# Patient Record
Sex: FEMALE | Race: WHITE | NOT HISPANIC OR LATINO | URBAN - METROPOLITAN AREA
[De-identification: names, ages, dates, MRNs, and addresses within clinical notes are randomized per-mention and may not be internally consistent; named-entity substitution may affect disease eponyms.]

---

## 2017-04-15 ENCOUNTER — APPOINTMENT (OUTPATIENT)
Dept: RADIOLOGY | Facility: MEDICAL CENTER | Age: 27
DRG: 917 | End: 2017-04-15
Attending: SURGERY
Payer: COMMERCIAL

## 2017-04-15 ENCOUNTER — APPOINTMENT (OUTPATIENT)
Dept: RADIOLOGY | Facility: MEDICAL CENTER | Age: 27
DRG: 917 | End: 2017-04-15
Attending: EMERGENCY MEDICINE
Payer: COMMERCIAL

## 2017-04-15 ENCOUNTER — RESOLUTE PROFESSIONAL BILLING HOSPITAL PROF FEE (OUTPATIENT)
Dept: OTHER | Facility: MEDICAL CENTER | Age: 27
End: 2017-04-15
Payer: COMMERCIAL

## 2017-04-15 ENCOUNTER — HOSPITAL ENCOUNTER (INPATIENT)
Facility: MEDICAL CENTER | Age: 27
LOS: 6 days | DRG: 917 | End: 2017-04-21
Attending: EMERGENCY MEDICINE | Admitting: INTERNAL MEDICINE
Payer: COMMERCIAL

## 2017-04-15 ENCOUNTER — APPOINTMENT (OUTPATIENT)
Dept: RADIOLOGY | Facility: MEDICAL CENTER | Age: 27
DRG: 917 | End: 2017-04-15
Attending: STUDENT IN AN ORGANIZED HEALTH CARE EDUCATION/TRAINING PROGRAM
Payer: COMMERCIAL

## 2017-04-15 DIAGNOSIS — R41.82 ALTERED MENTAL STATUS, UNSPECIFIED ALTERED MENTAL STATUS TYPE: ICD-10-CM

## 2017-04-15 DIAGNOSIS — I95.9 HYPOTENSION, UNSPECIFIED HYPOTENSION TYPE: ICD-10-CM

## 2017-04-15 DIAGNOSIS — Z01.818 ENCOUNTER FOR INTUBATION: ICD-10-CM

## 2017-04-15 PROBLEM — J96.90 RESPIRATORY FAILURE (HCC): Status: ACTIVE | Noted: 2017-04-15

## 2017-04-15 LAB
ABO GROUP BLD: NORMAL
ALBUMIN SERPL BCP-MCNC: 4 G/DL (ref 3.2–4.9)
ALBUMIN/GLOB SERPL: 1.3 G/DL
ALP SERPL-CCNC: 78 U/L (ref 30–99)
ALT SERPL-CCNC: 11 U/L (ref 2–50)
AMPHET UR QL SCN: POSITIVE
ANION GAP SERPL CALC-SCNC: 13 MMOL/L (ref 0–11.9)
APAP SERPL-MCNC: <10 UG/ML (ref 10–30)
APTT PPP: 24.7 SEC (ref 24.7–36)
AST SERPL-CCNC: 12 U/L (ref 12–45)
BARBITURATES UR QL SCN: NEGATIVE
BASE EXCESS BLDA CALC-SCNC: -5 MMOL/L (ref -4–3)
BASE EXCESS BLDA CALC-SCNC: -8 MMOL/L (ref -4–3)
BENZODIAZ UR QL SCN: NEGATIVE
BILIRUB SERPL-MCNC: 0.9 MG/DL (ref 0.1–1.5)
BLD GP AB SCN SERPL QL: NORMAL
BODY TEMPERATURE: ABNORMAL CENTIGRADE
BODY TEMPERATURE: ABNORMAL DEGREES
BUN SERPL-MCNC: 15 MG/DL (ref 8–22)
BZE UR QL SCN: NEGATIVE
CALCIUM SERPL-MCNC: 9.3 MG/DL (ref 8.5–10.5)
CANNABINOIDS UR QL SCN: NEGATIVE
CFT BLD TEG: 6 MIN (ref 5–10)
CHLORIDE SERPL-SCNC: 105 MMOL/L (ref 96–112)
CLOT ANGLE BLD TEG: 67.9 DEGREES (ref 53–72)
CLOT LYSIS 30M P MA LENFR BLD TEG: 3.1 % (ref 0–8)
CO2 BLDA-SCNC: 19 MMOL/L (ref 20–33)
CO2 SERPL-SCNC: 18 MMOL/L (ref 20–33)
CREAT SERPL-MCNC: 0.78 MG/DL (ref 0.5–1.4)
CT.EXTRINSIC BLD ROTEM: 1.5 MIN (ref 1–3)
EKG IMPRESSION: NORMAL
ERYTHROCYTE [DISTWIDTH] IN BLOOD BY AUTOMATED COUNT: 43.8 FL (ref 35.9–50)
ETHANOL BLD-MCNC: 0 G/DL
GFR SERPL CREATININE-BSD FRML MDRD: >60 ML/MIN/1.73 M 2
GLOBULIN SER CALC-MCNC: 3.1 G/DL (ref 1.9–3.5)
GLUCOSE SERPL-MCNC: 215 MG/DL (ref 65–99)
HCG SERPL QL: NEGATIVE
HCO3 BLDA-SCNC: 18 MMOL/L (ref 17–25)
HCO3 BLDA-SCNC: 18.1 MMOL/L (ref 17–25)
HCT VFR BLD AUTO: 38.1 % (ref 37–47)
HGB BLD-MCNC: 12.9 G/DL (ref 12–16)
INR PPP: 1.19 (ref 0.87–1.13)
LACTATE BLD-SCNC: 1.1 MMOL/L (ref 0.5–2)
LACTATE BLD-SCNC: 3.7 MMOL/L (ref 0.5–2)
LITHIUM SERPL-MCNC: <0.1 MMOL/L (ref 0.6–1.2)
MAGNESIUM SERPL-MCNC: 1.9 MG/DL (ref 1.5–2.5)
MCF BLD TEG: 64.7 MM (ref 50–70)
MCH RBC QN AUTO: 30.3 PG (ref 27–33)
MCHC RBC AUTO-ENTMCNC: 33.9 G/DL (ref 33.6–35)
MCV RBC AUTO: 89.4 FL (ref 81.4–97.8)
MDMA UR QL SCN: NEGATIVE
METHADONE UR QL SCN: NEGATIVE
O2/TOTAL GAS SETTING VFR VENT: 50 %
OPIATES UR QL SCN: NEGATIVE
OXYCODONE UR QL SCN: NEGATIVE
PA AA BLD-ACNC: 10.8 %
PA ADP BLD-ACNC: 18.6 %
PCO2 BLDA: 30.1 MMHG (ref 26–37)
PCO2 BLDA: 38.3 MMHG (ref 26–37)
PCO2 TEMP ADJ BLDA: 36.6 MMHG (ref 26–37)
PCP UR QL SCN: NEGATIVE
PH BLDA: 7.28 [PH] (ref 7.4–7.5)
PH BLDA: 7.4 [PH] (ref 7.4–7.5)
PH TEMP ADJ BLDA: 7.3 [PH] (ref 7.4–7.5)
PLATELET # BLD AUTO: 226 K/UL (ref 164–446)
PMV BLD AUTO: 9.2 FL (ref 9–12.9)
PO2 BLDA: 235 MMHG (ref 64–87)
PO2 BLDA: 333.1 MMHG (ref 64–87)
PO2 TEMP ADJ BLDA: 230 MMHG (ref 64–87)
POTASSIUM SERPL-SCNC: 2.6 MMOL/L (ref 3.6–5.5)
PROPOXYPH UR QL SCN: NEGATIVE
PROT SERPL-MCNC: 7.1 G/DL (ref 6–8.2)
PROTHROMBIN TIME: 15.5 SEC (ref 12–14.6)
RBC # BLD AUTO: 4.26 M/UL (ref 4.2–5.4)
RH BLD: NORMAL
SAO2 % BLDA: 100 % (ref 93–99)
SAO2 % BLDA: 99 % (ref 93–99)
SODIUM SERPL-SCNC: 136 MMOL/L (ref 135–145)
SPECIMEN DRAWN FROM PATIENT: ABNORMAL
TEG ALGORITHM TGALG: NORMAL
WBC # BLD AUTO: 5 K/UL (ref 4.8–10.8)

## 2017-04-15 PROCEDURE — 96375 TX/PRO/DX INJ NEW DRUG ADDON: CPT

## 2017-04-15 PROCEDURE — 85576 BLOOD PLATELET AGGREGATION: CPT

## 2017-04-15 PROCEDURE — 700111 HCHG RX REV CODE 636 W/ 250 OVERRIDE (IP): Performed by: PHARMACIST

## 2017-04-15 PROCEDURE — 5A1945Z RESPIRATORY VENTILATION, 24-96 CONSECUTIVE HOURS: ICD-10-PCS | Performed by: EMERGENCY MEDICINE

## 2017-04-15 PROCEDURE — 71010 DX-CHEST-LIMITED (1 VIEW): CPT

## 2017-04-15 PROCEDURE — 36600 WITHDRAWAL OF ARTERIAL BLOOD: CPT

## 2017-04-15 PROCEDURE — G0390 TRAUMA RESPONS W/HOSP CRITI: HCPCS

## 2017-04-15 PROCEDURE — 770022 HCHG ROOM/CARE - ICU (200)

## 2017-04-15 PROCEDURE — 80053 COMPREHEN METABOLIC PANEL: CPT

## 2017-04-15 PROCEDURE — 72125 CT NECK SPINE W/O DYE: CPT

## 2017-04-15 PROCEDURE — 700111 HCHG RX REV CODE 636 W/ 250 OVERRIDE (IP): Performed by: EMERGENCY MEDICINE

## 2017-04-15 PROCEDURE — 85027 COMPLETE CBC AUTOMATED: CPT

## 2017-04-15 PROCEDURE — 99291 CRITICAL CARE FIRST HOUR: CPT

## 2017-04-15 PROCEDURE — 700101 HCHG RX REV CODE 250: Performed by: EMERGENCY MEDICINE

## 2017-04-15 PROCEDURE — 83735 ASSAY OF MAGNESIUM: CPT

## 2017-04-15 PROCEDURE — 31500 INSERT EMERGENCY AIRWAY: CPT

## 2017-04-15 PROCEDURE — 0BH18EZ INSERTION OF ENDOTRACHEAL AIRWAY INTO TRACHEA, VIA NATURAL OR ARTIFICIAL OPENING ENDOSCOPIC: ICD-10-PCS | Performed by: EMERGENCY MEDICINE

## 2017-04-15 PROCEDURE — 85347 COAGULATION TIME ACTIVATED: CPT

## 2017-04-15 PROCEDURE — 96376 TX/PRO/DX INJ SAME DRUG ADON: CPT

## 2017-04-15 PROCEDURE — 86900 BLOOD TYPING SEROLOGIC ABO: CPT

## 2017-04-15 PROCEDURE — 99291 CRITICAL CARE FIRST HOUR: CPT | Mod: 25,GC | Performed by: INTERNAL MEDICINE

## 2017-04-15 PROCEDURE — 86850 RBC ANTIBODY SCREEN: CPT

## 2017-04-15 PROCEDURE — 80307 DRUG TEST PRSMV CHEM ANLYZR: CPT

## 2017-04-15 PROCEDURE — 85730 THROMBOPLASTIN TIME PARTIAL: CPT

## 2017-04-15 PROCEDURE — 93005 ELECTROCARDIOGRAM TRACING: CPT | Performed by: STUDENT IN AN ORGANIZED HEALTH CARE EDUCATION/TRAINING PROGRAM

## 2017-04-15 PROCEDURE — 700105 HCHG RX REV CODE 258: Performed by: PHARMACIST

## 2017-04-15 PROCEDURE — 80178 ASSAY OF LITHIUM: CPT

## 2017-04-15 PROCEDURE — 96374 THER/PROPH/DIAG INJ IV PUSH: CPT

## 2017-04-15 PROCEDURE — 82803 BLOOD GASES ANY COMBINATION: CPT

## 2017-04-15 PROCEDURE — 700111 HCHG RX REV CODE 636 W/ 250 OVERRIDE (IP): Performed by: STUDENT IN AN ORGANIZED HEALTH CARE EDUCATION/TRAINING PROGRAM

## 2017-04-15 PROCEDURE — 700102 HCHG RX REV CODE 250 W/ 637 OVERRIDE(OP): Performed by: EMERGENCY MEDICINE

## 2017-04-15 PROCEDURE — 85610 PROTHROMBIN TIME: CPT

## 2017-04-15 PROCEDURE — 36556 INSERT NON-TUNNEL CV CATH: CPT | Performed by: INTERNAL MEDICINE

## 2017-04-15 PROCEDURE — A9270 NON-COVERED ITEM OR SERVICE: HCPCS | Performed by: EMERGENCY MEDICINE

## 2017-04-15 PROCEDURE — 70450 CT HEAD/BRAIN W/O DYE: CPT

## 2017-04-15 PROCEDURE — 85384 FIBRINOGEN ACTIVITY: CPT

## 2017-04-15 PROCEDURE — 94002 VENT MGMT INPAT INIT DAY: CPT

## 2017-04-15 PROCEDURE — 84703 CHORIONIC GONADOTROPIN ASSAY: CPT

## 2017-04-15 PROCEDURE — 83605 ASSAY OF LACTIC ACID: CPT

## 2017-04-15 PROCEDURE — 76705 ECHO EXAM OF ABDOMEN: CPT

## 2017-04-15 PROCEDURE — 86901 BLOOD TYPING SEROLOGIC RH(D): CPT

## 2017-04-15 RX ORDER — POLYETHYLENE GLYCOL 3350 17 G/17G
1 POWDER, FOR SOLUTION ORAL
Status: DISCONTINUED | OUTPATIENT
Start: 2017-04-15 | End: 2017-04-21 | Stop reason: HOSPADM

## 2017-04-15 RX ORDER — CHLORHEXIDINE GLUCONATE ORAL RINSE 1.2 MG/ML
15 SOLUTION DENTAL 2 TIMES DAILY
Status: DISCONTINUED | OUTPATIENT
Start: 2017-04-15 | End: 2017-04-17

## 2017-04-15 RX ORDER — AMOXICILLIN 250 MG
2 CAPSULE ORAL 2 TIMES DAILY
Status: DISCONTINUED | OUTPATIENT
Start: 2017-04-15 | End: 2017-04-21 | Stop reason: HOSPADM

## 2017-04-15 RX ORDER — SODIUM CHLORIDE 9 MG/ML
INJECTION, SOLUTION INTRAVENOUS
Status: COMPLETED | OUTPATIENT
Start: 2017-04-15 | End: 2017-04-15

## 2017-04-15 RX ORDER — KETAMINE HYDROCHLORIDE 50 MG/ML
100 INJECTION, SOLUTION INTRAMUSCULAR; INTRAVENOUS ONCE
Status: COMPLETED | OUTPATIENT
Start: 2017-04-15 | End: 2017-04-15

## 2017-04-15 RX ORDER — LORAZEPAM 2 MG/ML
1 INJECTION INTRAMUSCULAR ONCE
Status: COMPLETED | OUTPATIENT
Start: 2017-04-15 | End: 2017-04-15

## 2017-04-15 RX ORDER — SODIUM CHLORIDE 9 MG/ML
INJECTION, SOLUTION INTRAVENOUS CONTINUOUS
Status: DISCONTINUED | OUTPATIENT
Start: 2017-04-15 | End: 2017-04-18

## 2017-04-15 RX ORDER — IPRATROPIUM BROMIDE AND ALBUTEROL SULFATE 2.5; .5 MG/3ML; MG/3ML
3 SOLUTION RESPIRATORY (INHALATION)
Status: DISCONTINUED | OUTPATIENT
Start: 2017-04-15 | End: 2017-04-21 | Stop reason: HOSPADM

## 2017-04-15 RX ORDER — BISACODYL 10 MG
10 SUPPOSITORY, RECTAL RECTAL
Status: DISCONTINUED | OUTPATIENT
Start: 2017-04-15 | End: 2017-04-15

## 2017-04-15 RX ORDER — HEPARIN SODIUM 5000 [USP'U]/ML
5000 INJECTION, SOLUTION INTRAVENOUS; SUBCUTANEOUS EVERY 8 HOURS
Status: DISCONTINUED | OUTPATIENT
Start: 2017-04-15 | End: 2017-04-17

## 2017-04-15 RX ORDER — ENEMA 19; 7 G/133ML; G/133ML
1 ENEMA RECTAL
Status: DISCONTINUED | OUTPATIENT
Start: 2017-04-15 | End: 2017-04-21 | Stop reason: HOSPADM

## 2017-04-15 RX ORDER — MIDAZOLAM HYDROCHLORIDE 1 MG/ML
INJECTION INTRAMUSCULAR; INTRAVENOUS
Status: COMPLETED | OUTPATIENT
Start: 2017-04-15 | End: 2017-04-15

## 2017-04-15 RX ORDER — LACTULOSE 20 G/30ML
30 SOLUTION ORAL
Status: DISCONTINUED | OUTPATIENT
Start: 2017-04-15 | End: 2017-04-21 | Stop reason: HOSPADM

## 2017-04-15 RX ORDER — SODIUM CHLORIDE 9 MG/ML
1000 INJECTION, SOLUTION INTRAVENOUS ONCE
Status: COMPLETED | OUTPATIENT
Start: 2017-04-15 | End: 2017-04-15

## 2017-04-15 RX ORDER — ONDANSETRON 2 MG/ML
4 INJECTION INTRAMUSCULAR; INTRAVENOUS EVERY 4 HOURS PRN
Status: DISCONTINUED | OUTPATIENT
Start: 2017-04-15 | End: 2017-04-21 | Stop reason: HOSPADM

## 2017-04-15 RX ORDER — NICOTINE 21 MG/24HR
14 PATCH, TRANSDERMAL 24 HOURS TRANSDERMAL
Status: DISCONTINUED | OUTPATIENT
Start: 2017-04-16 | End: 2017-04-18

## 2017-04-15 RX ORDER — POTASSIUM CHLORIDE 1.5 G/1.58G
40 POWDER, FOR SOLUTION ORAL ONCE
Status: COMPLETED | OUTPATIENT
Start: 2017-04-15 | End: 2017-04-15

## 2017-04-15 RX ORDER — SUCCINYLCHOLINE CHLORIDE 20 MG/ML
INJECTION INTRAMUSCULAR; INTRAVENOUS
Status: COMPLETED | OUTPATIENT
Start: 2017-04-15 | End: 2017-04-15

## 2017-04-15 RX ORDER — ACETAMINOPHEN 325 MG/1
650 TABLET ORAL EVERY 6 HOURS PRN
Status: DISCONTINUED | OUTPATIENT
Start: 2017-04-15 | End: 2017-04-21 | Stop reason: HOSPADM

## 2017-04-15 RX ORDER — KETAMINE HYDROCHLORIDE 50 MG/ML
INJECTION, SOLUTION INTRAMUSCULAR; INTRAVENOUS
Status: COMPLETED | OUTPATIENT
Start: 2017-04-15 | End: 2017-04-15

## 2017-04-15 RX ORDER — LIDOCAINE HYDROCHLORIDE 10 MG/ML
1-2 INJECTION, SOLUTION INFILTRATION; PERINEURAL
Status: DISCONTINUED | OUTPATIENT
Start: 2017-04-15 | End: 2017-04-17

## 2017-04-15 RX ORDER — BISACODYL 10 MG
10 SUPPOSITORY, RECTAL RECTAL
Status: DISCONTINUED | OUTPATIENT
Start: 2017-04-15 | End: 2017-04-21 | Stop reason: HOSPADM

## 2017-04-15 RX ORDER — POTASSIUM CHLORIDE 7.45 MG/ML
10 INJECTION INTRAVENOUS
Status: COMPLETED | OUTPATIENT
Start: 2017-04-15 | End: 2017-04-16

## 2017-04-15 RX ADMIN — KETAMINE HYDROCHLORIDE 50 MG: 50 INJECTION, SOLUTION, CONCENTRATE INTRAMUSCULAR; INTRAVENOUS at 19:55

## 2017-04-15 RX ADMIN — SODIUM CHLORIDE 1000 ML: 9 INJECTION, SOLUTION INTRAVENOUS at 20:09

## 2017-04-15 RX ADMIN — SODIUM CHLORIDE 1000 ML: 9 INJECTION, SOLUTION INTRAVENOUS at 20:03

## 2017-04-15 RX ADMIN — POTASSIUM CHLORIDE 40 MEQ: 1.5 POWDER, FOR SOLUTION ORAL at 20:51

## 2017-04-15 RX ADMIN — LORAZEPAM 1 MG: 2 INJECTION INTRAMUSCULAR; INTRAVENOUS at 21:20

## 2017-04-15 RX ADMIN — KETAMINE HYDROCHLORIDE 100 MG: 50 INJECTION, SOLUTION, CONCENTRATE INTRAMUSCULAR; INTRAVENOUS at 20:45

## 2017-04-15 RX ADMIN — SUCCINYLCHOLINE CHLORIDE 125 MG: 20 INJECTION, SOLUTION INTRAMUSCULAR; INTRAVENOUS at 19:58

## 2017-04-15 RX ADMIN — SODIUM CHLORIDE 1000 ML: 9 INJECTION, SOLUTION INTRAVENOUS at 22:15

## 2017-04-15 RX ADMIN — MIDAZOLAM HYDROCHLORIDE 2 MG: 1 INJECTION, SOLUTION INTRAMUSCULAR; INTRAVENOUS at 20:09

## 2017-04-15 RX ADMIN — FENTANYL CITRATE 100 MCG: 50 INJECTION, SOLUTION INTRAMUSCULAR; INTRAVENOUS at 21:20

## 2017-04-15 NOTE — IP AVS SNAPSHOT
" Home Care Instructions                                                                                                                  Name:Mago Bowman  Medical Record Number:[id number]  CSN: [id number]    YOB: 1990   Age: 26 y.o.  Sex: female  HT:1.702 m (5' 7.01\") WT: 61.7 kg (136 lb 0.4 oz)          Admit Date: 4/15/2017     Discharge Date:   Today's Date: 4/21/2017  Attending Doctor:  [facility] [place]*                  Allergies:  Ativan and Sulfa drugs            Discharge Instructions       Discharge Instructions    Discharged to home by car with relative. Discharged via wheelchair, hospital escort: Yes.  Special equipment needed: Not Applicable    Be sure to schedule a follow-up appointment with your primary care doctor or any specialists as instructed.     Discharge Plan:   Smoking Cessation Offered: Patient Counseled  Influenza Vaccine Indication: Patient Refuses    I understand that a diet low in cholesterol, fat, and sodium is recommended for good health. Unless I have been given specific instructions below for another diet, I accept this instruction as my diet prescription.   Other diet: as tolerated    Special Instructions:   Prescriptions given to family    · Is patient discharged on Warfarin / Coumadin?   No     · Is patient Post Blood Transfusion?  No    Depression / Suicide Risk    As you are discharged from this [facility] facility, it is important to learn how to keep safe from harming yourself.    Recognize the warning signs:  · Abrupt changes in personality, positive or negative- including increase in energy   · Giving away possessions  · Change in eating patterns- significant weight changes-  positive or negative  · Change in sleeping patterns- unable to sleep or sleeping all the time   · Unwillingness or inability to communicate  · Depression  · Unusual sadness, discouragement and loneliness  · Talk of wanting to die  · Neglect of personal appearance   · Rebelliousness- " reckless behavior  · Withdrawal from people/activities they love  · Confusion- inability to concentrate     If you or a loved one observes any of these behaviors or has concerns about self-harm, here's what you can do:  · Talk about it- your feelings and reasons for harming yourself  · Remove any means that you might use to hurt yourself (examples: pills, rope, extension cords, firearm)  · Get professional help from the community (Mental Health, Substance Abuse, psychological counseling)  · Do not be alone:Call your Safe Contact- someone whom you trust who will be there for you.  · Call your local CRISIS HOTLINE 492-7374 or 666-069-7860  · Call your local Children's Mobile Crisis Response Team [place] (958) 413-6894 or www.Threadflip  · Call the toll free National Suicide Prevention Hotlines   · National Suicide Prevention Lifeline 528-493-TIOF (8830)  · [place] [employer] Line Network 800-SUICIDE (309-8575)           Discharge Medication Instructions:    Below are the medications your physician expects you to take upon discharge:    Review all your home medications and newly ordered medications with your doctor and/or pharmacist. Follow medication instructions as directed by your doctor and/or pharmacist.    Please keep your medication list with you and share with your physician.               Medication List      START taking these medications        Instructions    Morning Afternoon Evening Bedtime    diazepam 10 MG tablet   Last time this was given:  10 mg on 4/20/2017  8:48 PM   Commonly known as:  VALIUM        Take 1 Tab by mouth every 8 hours as needed for Anxiety.   Dose:  10 mg                          CHANGE how you take these medications        Instructions    Morning Afternoon Evening Bedtime    lithium  MG Tbcr   What changed:    - medication strength  - how much to take  - when to take this   Last time this was given:  900 mg on 4/20/2017  8:45 PM   Commonly known as:  ESKALITH CR         Take 2 Tabs by mouth every day.   Dose:  900 mg                        quetiapine 200 MG Tabs   What changed:  how much to take   Last time this was given:  200 mg on 4/20/2017  8:45 PM   Commonly known as:  SEROQUEL        Take 1 Tab by mouth every evening.   Dose:  200 mg                             Where to Get Your Medications      Information about where to get these medications is not yet available     ! Ask your nurse or doctor about these medications    - diazepam 10 MG tablet  - lithium  MG Tbcr  - quetiapine 200 MG Tabs            Instructions           Diet / Nutrition:    Follow any diet instructions given to you by your doctor or the dietician, including how much salt (sodium) you are allowed each day.    If you are overweight, talk to your doctor about a weight reduction plan.    Activity:    Remain physically active following your doctor's instructions about exercise and activity.    Rest often.     Any time you become even a little tired or short of breath, SIT DOWN and rest.    Worsening Symptoms:    Report any of the following signs and symptoms to the doctor's office immediately:    *Pain of jaw, arm, or neck  *Chest pain not relieved by medication                               *Dizziness or loss of consciousness  *Difficulty breathing even when at rest   *More tired than usual                                       *Bleeding drainage or swelling of surgical site  *Swelling of feet, ankles, legs or stomach                 *Fever (>100ºF)  *Pink or blood tinged sputum  *Weight gain (3lbs/day or 5lbs /week)           *Shock from internal defibrillator (if applicable)  *Palpitations or irregular heartbeats                *Cool and/or numb extremities    Stroke Awareness    Common Risk Factors for Stroke include:    Age  Atrial Fibrillation  Carotid Artery Stenosis  Diabetes Mellitus  Excessive alcohol consumption  High blood pressure  Overweight   Physical inactivity  Smoking    Warning signs  and symptoms of a stroke include:    *Sudden numbness or weakness of the face, arm or leg (especially on one side of the body).  *Sudden confusion, trouble speaking or understanding.  *Sudden trouble seeing in one or both eyes.  *Sudden trouble walking, dizziness, loss of balance or coordination.Sudden severe headache with no known cause.    It is very important to get treatment quickly when a stroke occurs. If you experience any of the above warning signs, call 911 immediately.                   Disclaimer         Quit Smoking / Tobacco Use:    I understand the use of any tobacco products increases my chance of suffering from future heart disease or stroke and could cause other illnesses which may shorten my life. Quitting the use of tobacco products is the single most important thing I can do to improve my health. For further information on smoking / tobacco cessation call a Toll Free Quit Line at 1-125.647.8258 (*National Cancer [place]) or 1-251.431.5792 (American Lung Association) or you can access the web based program at www.lungNextNine.org.    [place] Tobacco Users Help Line:  (451) 460-5782       Toll Free: 1-736.708.2902  Quit Tobacco Program [facility] (571)953-4193    Crisis Hotline:    [place] Crisis Hotline:  9-855-XAPCYQL or 1-730.338.8377    [place] Crisis Hotline:    1-716.707.4077 or 237-997-1451    Discharge Survey:   [facility]. We hope we did everything we could to make your hospital stay a pleasant one. You may be receiving a phone survey and we would appreciate your time and participation in answering the questions. Your input is very valuable to us in our efforts to improve our service to our patients and their families.        My signature on this form indicates that:    1. I have reviewed and understand the above information.  2. My questions regarding this information have been answered to my satisfaction.  3. I have formulated a plan with my  discharge nurse to obtain my prescribed medications for home.                  Disclaimer         __________________________________                     __________       ________                       Patient Signature                                                 Date                    Time

## 2017-04-15 NOTE — IP AVS SNAPSHOT
SavingStar Access Code: [email or web address]  Expires: 5/21/2017 10:57 AM    Your email address is not on file at [facility].  Email Addresses are required for you to sign up for SavingStar, please contact [phone] to verify your personal information and to provide your email address prior to attempting to register for SavingStar.    Mago Martinbrenda  [address] [place] [address]    SavingStar  A secure, online tool to manage your health information     [employer]’s SavingStar® is a secure, online tool that connects you to your personalized health information from the privacy of your home -- day or night - making it very easy for you to manage your healthcare. Once the activation process is completed, you can even access your medical information using the SavingStar lalita, which is available for free in the Apple Lalita store or Google Play store.     To learn more about SavingStar, visit [email or web address]    There are two levels of access available (as shown below):   My Chart Features  [facility] Primary Care Doctor [facility]  Specialists [facility]  Urgent  Care Non-[facility] Primary Care Doctor   Email your healthcare team securely and privately 24/7 X X X    Manage appointments: schedule your next appointment; view details of past/upcoming appointments X      Request prescription refills. X      View recent personal medical records, including lab and immunizations X X X X   View health record, including health history, allergies, medications X X X X   Read reports about your outpatient visits, procedures, consult and ER notes X X X X   See your discharge summary, which is a recap of your hospital and/or ER visit that includes your diagnosis, lab results, and care plan X X  X     How to register for [employer]:  Once your e-mail address has been verified, follow the following steps to sign up for SavingStar.     1. Go to  [email or web address]  2. Click on the Sign Up Now box, which takes you to the New Member Sign Up page. You will need  to provide the following information:  a. Enter your ThetaRay Access Code exactly as it appears at the top of this page. (You will not need to use this code after you’ve completed the sign-up process. If you do not sign up before the expiration date, you must request a new code.)   b. Enter your date of birth.   c. Enter your home email address.   d. Click Submit, and follow the next screen’s instructions.  3. Create a ThetaRay ID. This will be your ThetaRay login ID and cannot be changed, so think of one that is secure and easy to remember.  4. Create a ThetaRay password. You can change your password at any time.  5. Enter your Password Reset Question and Answer. This can be used at a later time if you forget your password.   6. Enter your e-mail address. This allows you to receive e-mail notifications when new information is available in ThetaRay.  7. Click Sign Up. You can now view your health information.    For assistance activating your ThetaRay account, call [phone]

## 2017-04-15 NOTE — IP AVS SNAPSHOT
4/21/2017    Mago Bowman  [address] [address]    Dear Mago:    [facility] wants to ensure your discharge home is safe and you or your loved ones have had all of your questions answered regarding your care after you leave the hospital.    Below is a list of resources and contact information should you have any questions regarding your hospital stay, follow-up instructions, or active medical symptoms.    Questions or Concerns Regarding… Contact   Medical Questions Related to Your Discharge  (7 days a week, 8am-5pm) Contact a Nurse Care Coordinator   [phone]   Medical Questions Not Related to Your Discharge  (24 hours a day / 7 days a week)  Contact the Nurse Health Line   [phone]    Medications or Discharge Instructions Refer to your discharge packet   or contact your [facility] Primary Care Provider   [phone]   Follow-up Appointment(s) Schedule your appointment via "[employer]   or contact Scheduling [phone]   Billing Review your statement via "[employer]  or contact Billing [phone]   Medical Records Review your records via "[employer]   or contact Medical Records [phone]     You may receive a telephone call within two days of discharge. This call is to make certain you understand your discharge instructions and have the opportunity to have any questions answered. You can also easily access your medical information, test results and upcoming appointments via the "[employer] free online health management tool. You can learn more and sign up at [employer]/"[employer]. For assistance setting up your "[employer] account, please call [phone].    Once again, we want to ensure your discharge home is safe and that you have a clear understanding of any next steps in your care. If you have any questions or concerns, please do not hesitate to contact us, we are here for you. Thank you for choosing [facility] for your healthcare needs.    Sincerely,    Your [facility] Healthcare Team

## 2017-04-15 NOTE — IP AVS SNAPSHOT
" <p align=\"LEFT\"><IMG SRC=\"//[email or web address]/blob$/[email or web address]\" alt=\"Image\" WIDTH=\"50%\" HEIGHT=\"200\" BORDER=\"\"></p>                   Name:Mago Bowman  Medical Record Number:[id number]  CSN: [id number]    YOB: 1990   Age: 26 y.o.  Sex: female  HT:1.702 m (5' 7.01\") WT: 61.7 kg (136 lb 0.4 oz)          Admit Date: 4/15/2017     Discharge Date:   Today's Date: 4/21/2017  Attending Doctor:  [facility] [place] [facility]                  Allergies:  Ativan and Sulfa drugs             Medication List      Take these Medications        Instructions    diazepam 10 MG tablet   Commonly known as:  VALIUM    Take 1 Tab by mouth every 8 hours as needed for Anxiety.   Dose:  10 mg       lithium  MG Tbcr   What changed:    - medication strength  - how much to take  - when to take this   Commonly known as:  ESKALITH CR    Take 2 Tabs by mouth every day.   Dose:  900 mg       quetiapine 200 MG Tabs   What changed:  how much to take   Commonly known as:  SEROQUEL    Take 1 Tab by mouth every evening.   Dose:  200 mg         "

## 2017-04-16 ENCOUNTER — APPOINTMENT (OUTPATIENT)
Dept: RADIOLOGY | Facility: MEDICAL CENTER | Age: 27
DRG: 917 | End: 2017-04-16
Attending: STUDENT IN AN ORGANIZED HEALTH CARE EDUCATION/TRAINING PROGRAM
Payer: COMMERCIAL

## 2017-04-16 LAB
ABO GROUP BLD: NORMAL
ALBUMIN SERPL BCP-MCNC: 2.8 G/DL (ref 3.2–4.9)
ALBUMIN/GLOB SERPL: 1.3 G/DL
ALP SERPL-CCNC: 56 U/L (ref 30–99)
ALT SERPL-CCNC: 7 U/L (ref 2–50)
AMMONIA PLAS-SCNC: 18 UMOL/L (ref 11–45)
ANION GAP SERPL CALC-SCNC: 5 MMOL/L (ref 0–11.9)
AST SERPL-CCNC: 9 U/L (ref 12–45)
BASE EXCESS BLDA CALC-SCNC: -10 MMOL/L (ref -4–3)
BASE EXCESS BLDA CALC-SCNC: -9 MMOL/L (ref -4–3)
BASOPHILS # BLD AUTO: 0.4 % (ref 0–1.8)
BASOPHILS # BLD: 0.02 K/UL (ref 0–0.12)
BILIRUB SERPL-MCNC: 0.8 MG/DL (ref 0.1–1.5)
BODY TEMPERATURE: ABNORMAL DEGREES
BODY TEMPERATURE: ABNORMAL DEGREES
BUN SERPL-MCNC: 10 MG/DL (ref 8–22)
CA-I BLD ISE-SCNC: 1.31 MMOL/L (ref 1.1–1.3)
CALCIUM SERPL-MCNC: 7.6 MG/DL (ref 8.5–10.5)
CHLORIDE SERPL-SCNC: 117 MMOL/L (ref 96–112)
CO2 BLDA-SCNC: 16 MMOL/L (ref 20–33)
CO2 BLDA-SCNC: 16 MMOL/L (ref 20–33)
CO2 SERPL-SCNC: 17 MMOL/L (ref 20–33)
CREAT SERPL-MCNC: 0.5 MG/DL (ref 0.5–1.4)
EOSINOPHIL # BLD AUTO: 0.05 K/UL (ref 0–0.51)
EOSINOPHIL NFR BLD: 1.1 % (ref 0–6.9)
ERYTHROCYTE [DISTWIDTH] IN BLOOD BY AUTOMATED COUNT: 45.4 FL (ref 35.9–50)
GFR SERPL CREATININE-BSD FRML MDRD: >60 ML/MIN/1.73 M 2
GLOBULIN SER CALC-MCNC: 2.1 G/DL (ref 1.9–3.5)
GLUCOSE SERPL-MCNC: 100 MG/DL (ref 65–99)
HCO3 BLDA-SCNC: 15.2 MMOL/L (ref 17–25)
HCO3 BLDA-SCNC: 15.3 MMOL/L (ref 17–25)
HCT VFR BLD AUTO: 29.2 % (ref 37–47)
HCT VFR BLD AUTO: 31 % (ref 37–47)
HCT VFR BLD CALC: 23 % (ref 37–47)
HGB BLD-MCNC: 10.3 G/DL (ref 12–16)
HGB BLD-MCNC: 7.8 G/DL (ref 12–16)
HGB BLD-MCNC: 9.6 G/DL (ref 12–16)
IMM GRANULOCYTES # BLD AUTO: 0.02 K/UL (ref 0–0.11)
IMM GRANULOCYTES NFR BLD AUTO: 0.4 % (ref 0–0.9)
LITHIUM SERPL-MCNC: <0.1 MMOL/L (ref 0.6–1.2)
LYMPHOCYTES # BLD AUTO: 1.45 K/UL (ref 1–4.8)
LYMPHOCYTES NFR BLD: 32.3 % (ref 22–41)
MAGNESIUM SERPL-MCNC: 1.7 MG/DL (ref 1.5–2.5)
MCH RBC QN AUTO: 30.3 PG (ref 27–33)
MCHC RBC AUTO-ENTMCNC: 32.9 G/DL (ref 33.6–35)
MCV RBC AUTO: 92.1 FL (ref 81.4–97.8)
MONOCYTES # BLD AUTO: 0.64 K/UL (ref 0–0.85)
MONOCYTES NFR BLD AUTO: 14.3 % (ref 0–13.4)
NEUTROPHILS # BLD AUTO: 2.31 K/UL (ref 2–7.15)
NEUTROPHILS NFR BLD: 51.5 % (ref 44–72)
NRBC # BLD AUTO: 0 K/UL
NRBC BLD AUTO-RTO: 0 /100 WBC
O2/TOTAL GAS SETTING VFR VENT: 0.3 %
O2/TOTAL GAS SETTING VFR VENT: 30 %
PCO2 BLDA: 25.2 MMHG (ref 26–37)
PCO2 BLDA: 31.9 MMHG (ref 26–37)
PCO2 TEMP ADJ BLDA: 24.6 MMHG (ref 26–37)
PCO2 TEMP ADJ BLDA: 30.5 MMHG (ref 26–37)
PH BLDA: 7.29 [PH] (ref 7.4–7.5)
PH BLDA: 7.39 [PH] (ref 7.4–7.5)
PH TEMP ADJ BLDA: 7.3 [PH] (ref 7.4–7.5)
PH TEMP ADJ BLDA: 7.4 [PH] (ref 7.4–7.5)
PHOSPHATE SERPL-MCNC: 2.7 MG/DL (ref 2.5–4.5)
PLATELET # BLD AUTO: 151 K/UL (ref 164–446)
PMV BLD AUTO: 9.3 FL (ref 9–12.9)
PO2 BLDA: 131 MMHG (ref 64–87)
PO2 BLDA: 132 MMHG (ref 64–87)
PO2 TEMP ADJ BLDA: 125 MMHG (ref 64–87)
PO2 TEMP ADJ BLDA: 128 MMHG (ref 64–87)
POTASSIUM BLD-SCNC: 3.6 MMOL/L (ref 3.6–5.5)
POTASSIUM SERPL-SCNC: 3.7 MMOL/L (ref 3.6–5.5)
PROT SERPL-MCNC: 4.9 G/DL (ref 6–8.2)
RBC # BLD AUTO: 3.17 M/UL (ref 4.2–5.4)
SALICYLATES SERPL-MCNC: 0 MG/DL (ref 15–25)
SAO2 % BLDA: 99 % (ref 93–99)
SAO2 % BLDA: 99 % (ref 93–99)
SODIUM BLD-SCNC: 141 MMOL/L (ref 135–145)
SODIUM SERPL-SCNC: 139 MMOL/L (ref 135–145)
SPECIMEN DRAWN FROM PATIENT: ABNORMAL
SPECIMEN DRAWN FROM PATIENT: ABNORMAL
WBC # BLD AUTO: 4.5 K/UL (ref 4.8–10.8)

## 2017-04-16 PROCEDURE — 85014 HEMATOCRIT: CPT

## 2017-04-16 PROCEDURE — A9270 NON-COVERED ITEM OR SERVICE: HCPCS | Performed by: STUDENT IN AN ORGANIZED HEALTH CARE EDUCATION/TRAINING PROGRAM

## 2017-04-16 PROCEDURE — 82330 ASSAY OF CALCIUM: CPT

## 2017-04-16 PROCEDURE — 80178 ASSAY OF LITHIUM: CPT

## 2017-04-16 PROCEDURE — 99291 CRITICAL CARE FIRST HOUR: CPT | Mod: GC | Performed by: INTERNAL MEDICINE

## 2017-04-16 PROCEDURE — 84132 ASSAY OF SERUM POTASSIUM: CPT

## 2017-04-16 PROCEDURE — 85018 HEMOGLOBIN: CPT

## 2017-04-16 PROCEDURE — 85025 COMPLETE CBC W/AUTO DIFF WBC: CPT

## 2017-04-16 PROCEDURE — 700102 HCHG RX REV CODE 250 W/ 637 OVERRIDE(OP): Performed by: INTERNAL MEDICINE

## 2017-04-16 PROCEDURE — 700105 HCHG RX REV CODE 258

## 2017-04-16 PROCEDURE — A9270 NON-COVERED ITEM OR SERVICE: HCPCS | Performed by: INTERNAL MEDICINE

## 2017-04-16 PROCEDURE — B548ZZA ULTRASONOGRAPHY OF SUPERIOR VENA CAVA, GUIDANCE: ICD-10-PCS | Performed by: STUDENT IN AN ORGANIZED HEALTH CARE EDUCATION/TRAINING PROGRAM

## 2017-04-16 PROCEDURE — C1751 CATH, INF, PER/CENT/MIDLINE: HCPCS

## 2017-04-16 PROCEDURE — 770022 HCHG ROOM/CARE - ICU (200)

## 2017-04-16 PROCEDURE — 94003 VENT MGMT INPAT SUBQ DAY: CPT

## 2017-04-16 PROCEDURE — 3E043XZ INTRODUCTION OF VASOPRESSOR INTO CENTRAL VEIN, PERCUTANEOUS APPROACH: ICD-10-PCS | Performed by: STUDENT IN AN ORGANIZED HEALTH CARE EDUCATION/TRAINING PROGRAM

## 2017-04-16 PROCEDURE — 700105 HCHG RX REV CODE 258: Performed by: STUDENT IN AN ORGANIZED HEALTH CARE EDUCATION/TRAINING PROGRAM

## 2017-04-16 PROCEDURE — 83735 ASSAY OF MAGNESIUM: CPT

## 2017-04-16 PROCEDURE — 700111 HCHG RX REV CODE 636 W/ 250 OVERRIDE (IP): Performed by: STUDENT IN AN ORGANIZED HEALTH CARE EDUCATION/TRAINING PROGRAM

## 2017-04-16 PROCEDURE — 36556 INSERT NON-TUNNEL CV CATH: CPT

## 2017-04-16 PROCEDURE — 80053 COMPREHEN METABOLIC PANEL: CPT

## 2017-04-16 PROCEDURE — 700102 HCHG RX REV CODE 250 W/ 637 OVERRIDE(OP): Performed by: STUDENT IN AN ORGANIZED HEALTH CARE EDUCATION/TRAINING PROGRAM

## 2017-04-16 PROCEDURE — 82803 BLOOD GASES ANY COMBINATION: CPT

## 2017-04-16 PROCEDURE — 84100 ASSAY OF PHOSPHORUS: CPT

## 2017-04-16 PROCEDURE — 700101 HCHG RX REV CODE 250: Performed by: INTERNAL MEDICINE

## 2017-04-16 PROCEDURE — 02HV33Z INSERTION OF INFUSION DEVICE INTO SUPERIOR VENA CAVA, PERCUTANEOUS APPROACH: ICD-10-PCS | Performed by: STUDENT IN AN ORGANIZED HEALTH CARE EDUCATION/TRAINING PROGRAM

## 2017-04-16 PROCEDURE — 71010 DX-CHEST-PORTABLE (1 VIEW): CPT

## 2017-04-16 PROCEDURE — 82140 ASSAY OF AMMONIA: CPT

## 2017-04-16 PROCEDURE — 76937 US GUIDE VASCULAR ACCESS: CPT

## 2017-04-16 PROCEDURE — 93010 ELECTROCARDIOGRAM REPORT: CPT | Performed by: INTERNAL MEDICINE

## 2017-04-16 PROCEDURE — 700111 HCHG RX REV CODE 636 W/ 250 OVERRIDE (IP): Performed by: INTERNAL MEDICINE

## 2017-04-16 PROCEDURE — 93005 ELECTROCARDIOGRAM TRACING: CPT | Performed by: STUDENT IN AN ORGANIZED HEALTH CARE EDUCATION/TRAINING PROGRAM

## 2017-04-16 PROCEDURE — 36600 WITHDRAWAL OF ARTERIAL BLOOD: CPT

## 2017-04-16 PROCEDURE — 700105 HCHG RX REV CODE 258: Performed by: INTERNAL MEDICINE

## 2017-04-16 PROCEDURE — 84295 ASSAY OF SERUM SODIUM: CPT

## 2017-04-16 RX ORDER — FAMOTIDINE 20 MG/1
20 TABLET, FILM COATED ORAL 2 TIMES DAILY
Status: DISCONTINUED | OUTPATIENT
Start: 2017-04-16 | End: 2017-04-17

## 2017-04-16 RX ORDER — LITHIUM CARBONATE 300 MG/1
300 TABLET, FILM COATED, EXTENDED RELEASE ORAL EVERY EVENING
Status: ON HOLD | COMMUNITY
End: 2017-04-21

## 2017-04-16 RX ORDER — QUETIAPINE FUMARATE 200 MG/1
400 TABLET, FILM COATED ORAL EVERY EVENING
Status: ON HOLD | COMMUNITY
End: 2017-04-21

## 2017-04-16 RX ORDER — SODIUM CHLORIDE 9 MG/ML
INJECTION, SOLUTION INTRAVENOUS
Status: COMPLETED
Start: 2017-04-16 | End: 2017-04-16

## 2017-04-16 RX ORDER — POTASSIUM CHLORIDE 14.9 MG/ML
20 INJECTION INTRAVENOUS ONCE
Status: COMPLETED | OUTPATIENT
Start: 2017-04-16 | End: 2017-04-16

## 2017-04-16 RX ORDER — MAGNESIUM SULFATE HEPTAHYDRATE 40 MG/ML
4 INJECTION, SOLUTION INTRAVENOUS ONCE
Status: COMPLETED | OUTPATIENT
Start: 2017-04-16 | End: 2017-04-16

## 2017-04-16 RX ADMIN — POTASSIUM CHLORIDE 10 MEQ: 7.46 INJECTION, SOLUTION INTRAVENOUS at 02:26

## 2017-04-16 RX ADMIN — FAMOTIDINE 20 MG: 10 INJECTION, SOLUTION INTRAVENOUS at 20:09

## 2017-04-16 RX ADMIN — POTASSIUM CHLORIDE 10 MEQ: 7.46 INJECTION, SOLUTION INTRAVENOUS at 03:36

## 2017-04-16 RX ADMIN — SODIUM CHLORIDE: 9 INJECTION, SOLUTION INTRAVENOUS at 00:32

## 2017-04-16 RX ADMIN — POTASSIUM CHLORIDE 10 MEQ: 7.46 INJECTION, SOLUTION INTRAVENOUS at 00:19

## 2017-04-16 RX ADMIN — CHLORHEXIDINE GLUCONATE 15 ML: 1.2 RINSE ORAL at 12:48

## 2017-04-16 RX ADMIN — NICOTINE 14 MG: 14 PATCH TRANSDERMAL at 05:38

## 2017-04-16 RX ADMIN — LIDOCAINE HYDROCHLORIDE 2 ML: 10 INJECTION, SOLUTION INFILTRATION; PERINEURAL at 19:54

## 2017-04-16 RX ADMIN — CHLORHEXIDINE GLUCONATE 15 ML: 1.2 RINSE ORAL at 00:31

## 2017-04-16 RX ADMIN — SODIUM CHLORIDE: 9 INJECTION, SOLUTION INTRAVENOUS at 19:43

## 2017-04-16 RX ADMIN — SODIUM CHLORIDE: 9 INJECTION, SOLUTION INTRAVENOUS at 06:36

## 2017-04-16 RX ADMIN — DEXMEDETOMIDINE HYDROCHLORIDE 0.1 MCG/KG/HR: 100 INJECTION, SOLUTION, CONCENTRATE INTRAVENOUS at 14:52

## 2017-04-16 RX ADMIN — HEPARIN SODIUM 5000 UNITS: 5000 INJECTION, SOLUTION INTRAVENOUS; SUBCUTANEOUS at 12:46

## 2017-04-16 RX ADMIN — POTASSIUM CHLORIDE 20 MEQ: 14.9 INJECTION, SOLUTION INTRAVENOUS at 07:32

## 2017-04-16 RX ADMIN — DEXMEDETOMIDINE HYDROCHLORIDE 0.8 MCG/KG/HR: 100 INJECTION, SOLUTION, CONCENTRATE INTRAVENOUS at 23:32

## 2017-04-16 RX ADMIN — SODIUM CHLORIDE: 9 INJECTION, SOLUTION INTRAVENOUS at 12:48

## 2017-04-16 RX ADMIN — POTASSIUM CHLORIDE 10 MEQ: 7.46 INJECTION, SOLUTION INTRAVENOUS at 01:14

## 2017-04-16 RX ADMIN — FAMOTIDINE 20 MG: 10 INJECTION, SOLUTION INTRAVENOUS at 12:46

## 2017-04-16 RX ADMIN — SODIUM CHLORIDE 500 ML: 9 INJECTION, SOLUTION INTRAVENOUS at 20:16

## 2017-04-16 RX ADMIN — HEPARIN SODIUM 5000 UNITS: 5000 INJECTION, SOLUTION INTRAVENOUS; SUBCUTANEOUS at 20:09

## 2017-04-16 RX ADMIN — CHLORHEXIDINE GLUCONATE 15 ML: 1.2 RINSE ORAL at 20:09

## 2017-04-16 RX ADMIN — MAGNESIUM SULFATE IN WATER 4 G: 40 INJECTION, SOLUTION INTRAVENOUS at 07:36

## 2017-04-16 RX ADMIN — DEXMEDETOMIDINE HYDROCHLORIDE 0.3 MCG/KG/HR: 100 INJECTION, SOLUTION, CONCENTRATE INTRAVENOUS at 00:32

## 2017-04-16 RX ADMIN — SODIUM CHLORIDE: 9 INJECTION, SOLUTION INTRAVENOUS at 01:49

## 2017-04-16 RX ADMIN — HEPARIN SODIUM 5000 UNITS: 5000 INJECTION, SOLUTION INTRAVENOUS; SUBCUTANEOUS at 05:37

## 2017-04-16 RX ADMIN — HEPARIN SODIUM 5000 UNITS: 5000 INJECTION, SOLUTION INTRAVENOUS; SUBCUTANEOUS at 00:31

## 2017-04-16 ASSESSMENT — ENCOUNTER SYMPTOMS
FEVER: 0
NAUSEA: 0
HEADACHES: 0
CHILLS: 0
PALPITATIONS: 0
HEARTBURN: 0

## 2017-04-16 ASSESSMENT — LIFESTYLE VARIABLES: SUBSTANCE_ABUSE: 1

## 2017-04-16 NOTE — PROGRESS NOTES
Pharmacy Note::    Patient with bag of pills brought with her.  I identified the pills as Lithium Carbonate 300 mg.  RN to have checked into pharmacy for storage of patient's own medications.  D/W ERP and updated.      Christina Lin, BarbraD, BCPS

## 2017-04-16 NOTE — ED NOTES
Poison control case number [id number], Suggested adding on aspirin and magnesium labs. Instructed to call back once lithium level results if over 2.5 as pt could need dialysis if it is that high. Labs added on.

## 2017-04-16 NOTE — PROGRESS NOTES
[facility] GOLD PROGRESS NOTE               Author: Miller Merritt Date & Time created: 4/16/2017  6:47 AM           Diagnosis:  Altered mental status,/likely drug overdose  Acute toxic/metabolic encephalopathy  Acute hypoxic respiratory failure  Sinus tachycardia  Amphetamine abuse    History of suicidal attempts  History of substance abuse  History of uterine orders  Extensive psych history  PTSD  schizoaffective disorder  self mutilation  Hypokalemia /hypomagnesemia   Lactic acidosis   Coagulopathy  Anemia    ID:  26-year-old female with above-mentioned past medical history was blocking the EMS after a witnessed ground-level fall outside her apartment with a bag of lithium and Seroquel. She stopped taking Li and seroquel 2 weeks ago. However had refilled her lithium and Seroquel 2 days ago. Her lithium level is undetectable. She is intubated for airway protection and urine toxicology is positive for amphetamine. CT had an cervical spine is negative.      Interval History:  Discussed in detail with mother at bedside  Continue vent support, intravenous fluids:  spontaneous breathing trial and follow for extubation  Repeat K, mag and phosphorus  If no improvement in mentation will consider EEG  Drop in hemoglobin likely dilutional, repeat hemoglobin  History of suicidal attempts in past: Initially legal hold and psych consult  Follow QTC       Review of Systems:  Review of Systems   Constitutional: Negative for fever and chills.   HENT: Negative for hearing loss.    Cardiovascular: Negative for chest pain and palpitations.   Gastrointestinal: Negative for heartburn and nausea.   Genitourinary: Negative for dysuria and urgency.   Neurological: Negative for headaches.   Psychiatric/Behavioral: Positive for suicidal ideas and substance abuse.       Physical Exam:  Physical Exam   Constitutional: She appears well-developed.   Eyes: Pupils are equal, round, and reactive to light.   Neck: No tracheal deviation present. No  thyromegaly present.   Cardiovascular: Normal rate.  Exam reveals no gallop and no friction rub.    No murmur heard.  Pulmonary/Chest: No respiratory distress. She has no wheezes. She has no rales.   Abdominal: She exhibits no distension. There is no tenderness.   Genitourinary: Guaiac negative stool.   Musculoskeletal: She exhibits no edema.   Neurological: No cranial nerve deficit.   Intubated/sedated       Labs:  Recent Labs      04/15/17   1955  04/15/17   2212  04/16/17   0448   PLAZJ69H  7.40   --    --    [id number]  30.1   --    --    [id number]  333.1*   --    --    [id number]  99.0   --    --    ARTHCO3  18   --    --    ARTBE  -5*   --    --    ISTATAPH   --   7.284*  7.288*   ISTATAPCO2   --   38.3*  31.9   ISTATAPO2   --   235*  132*   ISTATATCO2   --   19*  16*   RLGRFLH3DPZ   --   100*  99   ISTATARTHCO3   --   18.1  15.3*   ISTATARTBE   --   -8*  -10*   ISTATTEMP   --   96.8 F  96.7 F   ISTATFIO2   --   50  .30   ISTATSPEC   --   Arterial  Arterial   ISTATAPHTC   --   7.297*  7.303*   HKDIZTUR2CO   --   230*  125*         Recent Labs      04/15/17   1955  04/16/17   0500   SODIUM  136  139   POTASSIUM  2.6*  3.7   CHLORIDE  105  117*   CO2  18*  17*   BUN  15  10   CREATININE  0.78  0.50   MAGNESIUM  1.9  1.7   PHOSPHORUS   --   2.7   CALCIUM  9.3  7.6*     Recent Labs      04/15/17   1955  04/16/17   0500   ALTSGPT  11  7   ASTSGOT  12  9*   ALKPHOSPHAT  78  56   TBILIRUBIN  0.9  0.8   GLUCOSE  215*  100*     Recent Labs      04/15/17   1955  04/16/17   0500   RBC  4.26  3.17*   HEMOGLOBIN  12.9  9.6*   HEMATOCRIT  38.1  29.2*   PLATELETCT  226  151*   PROTHROMBTM  15.5*   --    APTT  24.7   --    INR  1.19*   --      Recent Labs      04/15/17   1955  04/16/17   0500   WBC  5.0  4.5*   NEUTSPOLYS   --   51.50   LYMPHOCYTES   --   32.30   MONOCYTES   --   14.30*   EOSINOPHILS   --   1.10   BASOPHILS   --   0.40   ASTSGOT  12  9*   ALTSGPT  11  7   ALKPHOSPHAT  78  56   TBILIRUBIN  0.9  0.8            Hemodynamics:  Temp (24hrs), Av °C (96.8 °F), Min:35.9 °C (96.7 °F), Max:36.1 °C (97 °F)  Temperature: 36.1 °C (96.9 °F)  Pulse  Av.3  Min: 96  Max: 143Heart Rate (Monitored): 96  Blood Pressure: (!) 98/47 mmHg, NIBP: 100/63 mmHg     Did not require pressors,  Respiratory:  Key Vent Mode: ASV, FiO2: 30, P Peak (PIP): 17, P MEAN: 11 Respiration: (!) 23, Pulse Oximetry: 100 %    intubated/sedated  Decrease FiO2 and SBT; follow for extubation    Fluids:    Intake/Output Summary (Last 24 hours) at 17 0647  Last data filed at 17 0600   Gross per 24 hour   Intake   4123 ml   Output   1550 ml   Net   2573 ml     Weight: 62.1 kg (136 lb 14.5 oz)  Normal saline at 125     GI/Nutrition:  Orders Placed This Encounter   Procedures   • Diet NPO     Standing Status: Standing      Number of Occurrences: 1      Standing Expiration Date:      Order Specific Question:  Type:     Answer:  Now [1]     Order Specific Question:  Restrict to:     Answer:  Strict [1]    nothing by mouth  OG for now  If no extubation will put core track    Medical Decision Making, by Problem:  Active Hospital Problems    Diagnosis   • Respiratory failure (CMS-HCC) [J96.90]   • Altered mental status [R41.82]      Altered mental status,/likely drug overdose  Acute toxic/metabolic encephalopathy  suspect drug overdose /Acute toxic/metabolic encephalopathy   suspect polysubstance o/d  Get ammonia level  Discussed in detail with mother at bedside  Continue vent support, intravenous fluids:  spontaneous breathing trial and follow for extubation  If no improvement in mentation will consider EEG  History of suicidal attempts in past: Initially legal hold and psych consult  Follow QTC    Acute hypoxic respiratory failure  intubated for airway protection/VDRF  Discussed in detail with mother at bedside  Continue vent support, intravenous fluids:  spontaneous breathing trial and follow for extubation      Sinus tachy  better with  IVF, BP soft  Follow EKG  Continue intravenous fluids    Amphetamine abuse    History of suicidal attempts  History of substance abuse  History of uterine orders  Extensive psych history  PTSD  schizoaffective d/o  self mutilation  Institutionalized as a kid  Stop taking lithium and Seroquel a few weeks ago  Started intravenous drug abuse recently  History of suicidal attempts  Poison control contacted  Initiate legal hold  Psych consult  Precedex for agitation  Discussed in detail with mother at bedside  Continue vent support, intravenous fluids:  spontaneous breathing trial and follow for extubation  If no improvement in mentation will consider EEG        Hypokalemia /hypomagnesemia   Lactic acidosis   Repeat potassium and magnesium  Follow lactic acid, elevated because of dehydration, no signs or symptoms of sepsis     Coagulopathy  Repeat PT/INR  PT 15.5, INR 1.90     Anemia  Likely dilutional  Follow repeat hemoglobin       EKG reviewed, Radiology images reviewed, Labs reviewed and Medications reviewed  Mckenzie catheter: Critically Ill - Requiring Accurate Measurement of Urinary Output      DVT Prophylaxis: Enoxaparin (Lovenox)  DVT prophylaxis - mechanical: SCDs  Ulcer prophylaxis: Yes

## 2017-04-16 NOTE — H&P
TRAUMA NOTE    CHIEF COMPLAINT:  Found down, altered.      HISTORY OF PRESENT ILLNESS:  Patient is a pleasant 26-year-old female,   reportedly was found down with altered level of consciousness with a GCS of 3.    There are no overt signs of trauma.  She was brought to us via ground   ambulance, had been placed in a collar, en route blood pressure 90/48, heart   rate is 150.  She is on non-rebreather, sats of 100%, poorly had a field GCS   of 6, 3 for speech, 2 for incomprehensible verbal response and 1 for motor   response, poorly had pinpoint pupils, was given Narcan without any significant   response, and arrival is 1949 hours.    ALLERGIES:  Unable to obtain any information.  Patient is unresponsive.    MEDICATIONS:  Unknown.  Unable to obtain.    PAST MEDICAL HISTORY:  Unknown.  Unable to obtain.    PAST SURGICAL HISTORY:  Unknown.  Unable to obtain.    SOCIAL HISTORY:  Unknown.  Unable to obtain.    FAMILY HISTORY:  Unknown.  Unable to obtain.    REVIEW OF SYSTEMS:  Unable to obtain.    On arrival to the trauma room, temperature is 36, blood pressure is 74/49,   heart rate is 143, sats are 100%.    PRIMARY SURVEY:  Airways is compromised - she is quite altered.    Breathing is labored.  Breath sounds are clear.  Skin is warm with no obvious   hemorrhagic wounds.  She does have strong femoral pulse.  GCS - I have   no motor response, I get no verbal response, I get no eye opening, gives her a   3.  Blood pressure is 90/40, heart rate 143, respirations 20, sats are 100%.    SECONDARY SURVEY:  HEENT:  Head is atraumatic.  Pupils are 2, they are reactive, though sluggish,   they are equal.  Does have a gaze to the left.  TMs are clear.  Oropharynx   has no evidence of trauma, no facial wounds, no step-offs.  NECK:  Trachea is midline.  There is no JVD.  CHEST:  Symmetrical expansion.  Breath sounds are present, bilaterally clear.  CARDIAC:  S1, S2 is normal, tachycardic.  ABDOMEN:  Soft, nondistended.  No  peritoneal signs.  PELVIS:  Stable.  No diastasis.  EXTREMITIES:  Lower extremities:  Again multiple old bruises, no evidence of   new trauma.  No crepitus on palpation.  No deformity.  Upper extremities:  No   crepitus, no deformity.  There is normal cap refill.  BACK:  Atraumatic.    Patient underwent intubation due to compromise and now hemodynamic compromise   and low blood pressure.  She was given ketamine and succinylcholine was   intubated on the first attempt by Dr. Lawson and blood pressure is 84/43,   heart rate 143.  ___ patient's blood pressure is down to 63/39, tachycardic at   139, sats 100%.  She was given additional fluids, 2 IVs, saline IV started.    A FAST exam done at 2006 showed no evidence of free fluid.  She was given   about a liter and a half of fluid, the fluids were then turned down.  Blood   pressure was then 90/47, heart rate 130.  At this time, patient was given 2 mg   of Versed and taken to CAT scan.    LABORATORY STUDIES:  Have been already drawn.  Urine tox screen has been sent.    WBC of 5.0, hemoglobin 12.9, hematocrit 38.1, platelets are 226.  Potassium   3.7.  PT is 15.5 with INR of 1.19, PTT is 24.7.  Blood gas shows a pH of 7.40,   pCO2 of 30, PaO2 of 333, bicarb is 18, base excess -5.  Beta hCG is negative.    IMAGING:  Chest x-ray shows tubes in appropriate positions.  No other   abnormalities noted.  Head CT shows no acute intracranial abnormality.  CT   cervical spine shows no evidence of fracture or subluxation, some   straightening of spine.      Chemistries are still pending as well as platelet mapping.    IMPRESSION:  1.  Respiratory compromise, now intubated.  2.  Hypotension, not secondary to blood loss or hemorrhagic shock.  3.  Probable overdose, unknown substance.    PLAN:  Patient was transferred from CAT scan back to the                 .    The case was discussed with Dr. Deo Lawson in the emergency department.    He is going to resume care and contact  hospitalist as well as pulmonary.    Again, patient has no evidence of trauma at this time, have no further need of   trauma.  He will contact me if there are any other issues.    Total critical care time involved has been approximately 45 minutes.       ____________________________________     MD ADELAIDA NOLAND / SUZANNE    DD:  04/15/2017 20:48:59  DT:  04/15/2017 21:30:30    D#:  [id number]  Job#:  [id number]
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
## 2017-04-16 NOTE — CARE PLAN
Problem: Knowledge Deficit  Goal: Knowledge of disease process/condition, treatment plan, diagnostic tests, and medications will improve  Outcome: PROGRESSING AS EXPECTED  Treatment plan, disease process and current condition, tests, and medications discussed with patient's family. Family verbalizes understanding. Education documented. Unit routine and plan of care discussed. No further questions.    Problem: Skin Integrity  Goal: Risk for impaired skin integrity will decrease  Outcome: PROGRESSING AS EXPECTED  Skin integrity, appearance, temperature and risk factors for impaired skin integrity assessed and monitored throughout shift. Q2hour turns in place. Pillows in use for support and positioning.

## 2017-04-16 NOTE — PROGRESS NOTES
12 hour chart check. Assumed care of patient at 0700. Received bedside report from REHANA Dawn. Patient comfortable in bed sedated and restrained. Family at bedside with no concerns , questions at this time.  No signs or respiratory distress.  Appropriate alarms set. See flowsheets for VS. Will continue to closely monitor. Rates verified.

## 2017-04-16 NOTE — CARE PLAN
Problem: Ventilation Defect:  Goal: Ability to achieve and maintain unassisted ventilation or tolerate decreased levels of ventilator support  Outcome: PROGRESSING AS EXPECTED  Adult Ventilation Update    Total Vent Days: 2      Patient Lines/Drains/Airways Status    Active Airway      Name: Placement date: Placement time: Site: Days:     Airway Group ET Tube Oral 8.0 @ 23 04/15/17  2000  Oral  less than 1               APVcmv  16  /  380  /  +8  /  30%    In the last 24 hours, the patient tolerated SBT for 1 hour on settings of 5 over 8.    #FVC / Vital Capacity (liters) :  (not following) (04/16/17 1534)  NIF (cm H2O) :  (not following) (04/16/17 1534)  Rapid Shallow Breathing Index (RR/VT): 42 (04/16/17 1534)  Plateau Pressure (Q Shift): 13 (04/16/17 1534)  Static Compliance (ml / cm H2O): 92 (04/16/17 1534)    Patient failed trials because of    Barriers to SBT Weaning Trial Stopped due to:: Pt weaned for 1 hour and returned to rest settings per protocol (04/16/17 1534)  Length of Weaning Trial Length of Weaning Trial (Hours): 1 (04/16/17 1534)    Cough: Productive (04/16/17 1534)  Sputum Amount: Scant (04/16/17 1534)  Sputum Color: Clear (04/16/17 1534)  Sputum Consistency: Thin (04/16/17 1534)    Events/Summary/Plan: passive parameters obtained and placed pt onto CMV. RN aware (04/16/17 1534)

## 2017-04-16 NOTE — DISCHARGE PLANNING
Pt's mother Gissel ([phone] [phone]) and father Angelo Bowman ([phone]), both updated on pt's status.  Pt's mother Gissel will be heading to [place] in a few hours and Pt's father Angelo lives in [place]. Alerted bedside RN that if anything changes to call them.

## 2017-04-16 NOTE — ED NOTES
Nereyda, ICU RN to bedside for report and to transport pt upstairs with RT. Pt's mothers are at bedside and gave allergy information.

## 2017-04-16 NOTE — PROCEDURES
PROCEDURE:   Internal jugular central venous catheter, U/S guided.    INDICATION: hypotension, drug overdose    PROCEDURE : Serjio Hines M.D.    SUPERVISING :  Chris Hernandez M.D.    CONSENT:   Provided by patient's mother    PROCEDURE SUMMARY:   A time-out was performed. The patient's right neck region was prepped and draped in sterile fashion using chlorhexidine scrub. Anesthesia was achieved with 2% lidocaine. The right internal jugular vein was accessed under ultrasound guidance using a finder needle and sheath.  Venous blood was withdrawn and the sheath was advanced into the vein and the needle was withdrawn. A guidewire was advanced through the sheath. A small incision was made with a 10 blade scalpel and the sheath was exchanged for a dilator over the guidewire until appropriate dilation was obtained. The dilator was removed and a triple-lumen catheter was advanced over the guidewire and secured into place with 2 sutures at 17 cm. At time of procedure completion, all ports aspirated and flushed properly. Post-procedure x-ray shows the tip of the catheter within the superior vena cava.    COMPLICATIONS: none     ESTIMATED BLOOD LOSS: less than 5 cc.

## 2017-04-16 NOTE — DIETARY
"  Nutrition Support Assessment - Female    Yadira Grant-Nine is a 26 y.o. female with admitting DX of Altered mental status, Respiratory failure (CMS-HCC)    Pertinent History: reactive attachment disorder, bipolar disorder, PTSD, schizoaffective disorder   Allergies: Ativan and Sulfa drugs  Height: 170.2 cm (5' 7.01\")  Weight: 62.1 kg (136 lb 14.5 oz)  Weight to Use in Calculations: 62.1 kg (136 lb 14.5 oz)  Body mass index is 21.44 kg/(m^2).    Pertinent Labs: glucose: 100 Na: 139 K: 3.7 albumin: 2.8 Phos: 2.7    Last BM:  (PTA)  Pertinent Medications: pepcid, lactulose, senna, miralax, zofran   Pertinent Fluids: NS @200ml/hr = 4800ml/day   Surgery / Procedures: central line placemetn    Skin: intact; no documented skin issues      Estimated Needs: REE per MS x 1.2 injury factor + [facility] 2003b equation = 1422kcals   Total Calories / day: 1400 - 1700 (Calories / k.5 - 27)  Total Grams Protein / day: 62 - 75 (Grams Protein / k - 1.2)  Total Fluids ml / day: 1550 ml        Assessment / Evaluation: 25y/o F admit with AMS and suspect drug overdose with known past medical hx significant for reactive attachment disorder, bipolar disorder, PTSD, and schizoaffective disorder per H&P. Pt on vent at this time; therefore, consult for enteral nutrition recommendations received.    Plan / Recommendation:   1) TF to start with Fibersource HN @60ml/hr = 1728kcals, 78g protein, 1166ml free water (27.8cals/kg, 1.26g of pro/kg of CBW - 62.1kg).   2) RD to monitor TF at goal/tolerance, wt, and labs to adjust TF accordingly.   3) Fluids per MD.       "

## 2017-04-16 NOTE — H&P
[facility] INTERNAL MEDICINE HISTORY/ PHYSICAL:    Chris Hernandez  Date & Time note created:    4/15/2017   9:59 PM     Visit Time:  9:00pm    Patient ID:   Name:             Forty-Nine , Sassy     YOB: 1990  Age:                 26 y.o.  female   MRN:               [id number]    Admitting Senior (R2/R3):  Dr. KANDACE Hernandez   Admitting Attending/Team Color:  Dr. Jackson  ###############################################################    Chief Complaint:      Altered mental status, suspect drug over dose (lithium, seroquel)    History of Present Illness:   Ms. May is a 26 year old F presented to the hospital as a trauma after witnessed collapse with GLF/trauma to her head just outside her apartment. EMS found an unlabeled bag of drugs in her purse (later confirmed to be lithium and Seroquel) concerning for possible overdose. She was found by EMS minutes after the episodes, initally found to be sinus tachycardic, with altered mentation, pinpoint pupils but protecting her airway. The patient did not have a gag response in the emergency room and was subsequently intubated for airway protection. CT head, neck were negative for acute pathology.     Patient's mother and father were informed--her mother lives in [place] and reports that the patient has a past medical history significant for reactive attachment disorder, bipolar disorder, PTSD, Schizoaffective disorder. She has been institutionalized from the age of [age]. The mother last saw her at the beginning of this month, at which time the patient was known to be using recreational drugs including heroin and meth. Her mother was able to convince her to stop using drugs and resume her psych medication shortly before the patient left [place] on April 8th. She subsequently met a man and the mother has not heard from her since. The mother is on her way to [employer] and is expected around 4 in the morning. The patient's father lives in [place] and was also updated.  "    ED: She was given 3 L of IVF with improvement in sinus tachycardia from 140--> 110s. Her BP remained in the 80-90s with MAP: 55-65. Poison control was called and lithium level is pending.     Review of Systems:      Unable to obtain 2/2 altered mental status/ventilator dependent respiratory failure.    Past Medical/ Family / Social history (PFSH):        Past Medical History:   Reactive attachment disorder, bipolar disorder, PTSD, Schizoaffective disorder    Past Surgical History:  Unable to obtain     Current Outpatient Medications:   1. Lithium 300mg  2. seroquel     Medication Allergy/Sensitivities:  Allergies   Allergen Reactions   • Unable To Obtain        Family History:  Unable to obtain    Social History:  Polysubstance abuse reported by patient's mother        #################################################################  Physical Exam:  Vitals/ General Appearance:   Weight/BMI: Body mass index is 22.44 kg/(m^2).  BP 98/47 mmHg  Pulse 113  Temp(Src) 36 °C (96.8 °F)  Resp 42  Ht 1.702 m (5' 7\")  Wt 65 kg (143 lb 4.8 oz)  BMI 22.44 kg/m2  SpO2 100%  Filed Vitals:    04/15/17 2100 04/15/17 2115 04/15/17 2127 04/15/17 2143   BP:       Pulse: 118 115 114 113   Temp:       Resp: 37 37 25 42   Height:       Weight:       SpO2: 100% 100% 100% 100%     Oxygen Therapy:  Pulse Oximetry: 100 %  Vitals: sinus tachycardia, 110s. SBP: 80-90s, MAP: 55-65.   General: intubated. Sedated. Spontaneous movement of all extremities when off sedation.   HEENT: EOMI. Scleral clear. Pupils sluggish bilaterally.   CVS: tachycardia. No m/r/g. No JVD.   PULM: CTABL. No w/r/r. No basilar crackles.   ABD: soft, NT, ND. +BS.    GYN: vaginal bleeding noted during orozco insertion consistent with menses   : +orozco. Dark yellow colored urine.    EXT: no LE edema b/l. No cyanosis. 2+ DP b/l, warm extremities.  Neuro: vented, sedated.    Skin: no rashes.     #################################################################  MDM " (Data Review):     (n/a) Old Records Requested  (n/a) Records reviewed and summarized in current documentation    Lab Data Review:  Recent Results (from the past 24 hour(s))   DIAGNOSTIC ALCOHOL    Collection Time: 04/15/17  7:55 PM   Result Value Ref Range    Diagnostic Alcohol 0.00 0.00 g/dL   CBC WITHOUT DIFFERENTIAL    Collection Time: 04/15/17  7:55 PM   Result Value Ref Range    WBC 5.0 4.8 - 10.8 K/uL    RBC 4.26 4.20 - 5.40 M/uL    Hemoglobin 12.9 12.0 - 16.0 g/dL    Hematocrit 38.1 37.0 - 47.0 %    MCV 89.4 81.4 - 97.8 fL    MCH 30.3 27.0 - 33.0 pg    MCHC 33.9 33.6 - 35.0 g/dL    RDW 43.8 35.9 - 50.0 fL    Platelet Count 226 164 - 446 K/uL    MPV 9.2 9.0 - 12.9 fL   COMP METABOLIC PANEL    Collection Time: 04/15/17  7:55 PM   Result Value Ref Range    Sodium 136 135 - 145 mmol/L    Potassium 2.6 (LL) 3.6 - 5.5 mmol/L    Chloride 105 96 - 112 mmol/L    Co2 18 (L) 20 - 33 mmol/L    Anion Gap 13.0 (H) 0.0 - 11.9    Glucose 215 (H) 65 - 99 mg/dL    Bun 15 8 - 22 mg/dL    Creatinine 0.78 0.50 - 1.40 mg/dL    Calcium 9.3 8.5 - 10.5 mg/dL    AST(SGOT) 12 12 - 45 U/L    ALT(SGPT) 11 2 - 50 U/L    Alkaline Phosphatase 78 30 - 99 U/L    Total Bilirubin 0.9 0.1 - 1.5 mg/dL    Albumin 4.0 3.2 - 4.9 g/dL    Total Protein 7.1 6.0 - 8.2 g/dL    Globulin 3.1 1.9 - 3.5 g/dL    A-G Ratio 1.3 g/dL   PROTHROMBIN TIME    Collection Time: 04/15/17  7:55 PM   Result Value Ref Range    PT 15.5 (H) 12.0 - 14.6 sec    INR 1.19 (H) 0.87 - 1.13   APTT    Collection Time: 04/15/17  7:55 PM   Result Value Ref Range    APTT 24.7 24.7 - 36.0 sec   ARTERIAL BLOOD GAS    Collection Time: 04/15/17  7:55 PM   Result Value Ref Range    Ph 7.40 7.40 - 7.50    Pco2 30.1 26.0 - 37.0 mmHg    Po2 333.1 (H) 64.0 - 87.0 mmHg    O2 Saturation 99.0 93.0 - 99.0 %    Hco3 18 17 - 25 mmol/L    Base Excess -5 (L) -4 - 3 mmol/L    Body Temp see below Centigrade   LACTIC ACID    Collection Time: 04/15/17  7:55 PM   Result Value Ref Range    Lactic Acid 3.7  (H) 0.5 - 2.0 mmol/L   PLATELET MAPPING WITH BASIC TEG    Collection Time: 04/15/17  7:55 PM   Result Value Ref Range    Reaction Time Initial-R 6.0 5.0 - 10.0 min    Clot Kinetics-K 1.5 1.0 - 3.0 min    Clot Angle-Angle 67.9 53.0 - 72.0 degrees    Maximum Clot Strength-MA 64.7 50.0 - 70.0 mm    Lysis 30 minutes-LY30 3.1 0.0 - 8.0 %    % Inhibition ADP 18.6 %    % Inhibition AA 10.8 %    TEG Algorithm Link Algorithm    HCG QUAL SERUM    Collection Time: 04/15/17  7:55 PM   Result Value Ref Range    Beta-Hcg Qualitative Serum Negative Negative   URINE DRUG SCREEN    Collection Time: 04/15/17  7:55 PM   Result Value Ref Range    Amphetamines Urine Positive (A) Negative    Barbiturates Negative Negative    Benzodiazepines Negative Negative    Cocaine Metabolite Negative Negative    Methadone Negative Negative    Ecstasy Negative Negative    Opiates Negative Negative    Oxycodone Negative Negative    Phencyclidine -Pcp Negative Negative    Propoxyphene Negative Negative    Cannabinoid Metab Negative Negative   ACETAMINOPHEN    Collection Time: 04/15/17  7:55 PM   Result Value Ref Range    Acetomenophen -Tylenol <10 10 - 30 ug/mL   COD (ADULT)    Collection Time: 04/15/17  7:55 PM   Result Value Ref Range    ABO Grouping Only O     Rh Grouping Only POS     Antibody Screen-Cod NEG    ESTIMATED GFR    Collection Time: 04/15/17  7:55 PM   Result Value Ref Range    GFR If African American >60 >60 mL/min/1.73 m 2    GFR If Non African American >60 >60 mL/min/1.73 m 2   EKG    Collection Time: 04/15/17  9:16 PM   Result Value Ref Range    Report       [facility] Emergency Dept.    Test Date:  2017-04-15  Pt Name:    BELTRAN ESTES             Department: ER  MRN:        [id number]                      Room:       [facility]  Gender:     F                            Technician: [id number]  :        1990                   Requested By:HÉCTOR GARZON  Order #:    [id number]                    Reading  MD:    Measurements  Intervals                                Axis  Rate:       115                          P:          62  OH:         168                          QRS:        78  QRSD:       92                           T:          60  QT:         284  QTc:        393    Interpretive Statements  SINUS TACHYCARDIA  BORDERLINE T WAVE ABNORMALITIES  No previous ECG available for comparison         Imaging/Procedures Review:    (yes) Independant Review     US-ABDOMEN LIMITED   Final Result      Limited abdominal ultrasound showing no free fluid.      CT-CSPINE WITHOUT PLUS RECONS   Final Result      1.  Straightening of the cervical spine.   2.  No fracture or subluxation.         CT-HEAD W/O   Final Result      No acute intracranial abnormality.      DX-CHEST-LIMITED (1 VIEW)   Final Result      1.  No evidence for acute intrathoracic injury.   2.  Supportive tubing as described above.      DX-CHEST-PORTABLE (1 VIEW)    (Results Pending)      EKG:   (yes) Independant Review  QTc: 393, HR: 115, sinus tachy, no ischemia changes     MDM (Assessment and Plan):       1. Altered mental status, suspect drug overdose    2. Ventilator dependent respiratory failure    3. Hypokalemia    4. Lactic acidosis    5. Amphetamine abuse    6. Coagulopathy    7. Suspect prior history of psych disorder    Patient admitted after presenting with altered mental status with sudden collapse, subsequently intubated in the emergency room for airway protection. Her tachycardia improved after initial 3L IVF bolus but her MAPs remained low-normal. Considering prior psych history reported by patient's mother and pill bottles found in patient's purse, her presentation is concerning for prescription drug overdose v. Meth abuse with/without attempt to harm. The medications were later confirmed to be seroquel and lithium. Poison control was informed in the ED with case number: [id number]. Lithium level: <0.1 therefore suspect more contribution from  seroquel +/- polysubstance abuse. She will be admitted to ICU in guarded condition for further medical management.     PLAN:  - admit to ICU for supportive care   - continue full vent support: RT/OT protocol, repeat CXR/ABG in AM   - s/p fentanyl, ativan in ED; sedation with precedex after admission   - Lactic acidosis: 2/2 hypoperfusion: repeat LA q 4 hours, IVFs, monitor urine output  - s/p 3L IVF, administer additional 1L bolus followed by 200cc/hr maintenance   - CT head, neck: negative for acute pathology: C-collar removed in ED   - U/S abd: negative for fluid collection in abdomen   - CXR: no acute cardiopulmonary process   - Utox: positive for methamphetamine, Lithium: <0.1, tylenol, alcohol: unremarkable   - family updated: mother on her way to [facility], anticipate arrival around 4am    - consider psych consult/legal hold when mentation improves  - optimize electrolytes: K>4, Mg>2.0      Patient seen and examined with Dr. Jackson at bedside, who agrees with the aforementioned plan.

## 2017-04-16 NOTE — ED NOTES
Pt's mother, Gissel Bowman, called to say she will be coming tonight, but likely wont be able to get to hospital until 4am. Mother's phone number is [phone]. Mother states that pt has been diagnosed with Reactive Attachment Disorder, PTSD, and bipolar. Pt was in a psych hospital from the age of [age]yrs. Per's pt's mother, pt was at her house on April 10, 2017, and had used meth and heroine that day, but mom helped get pt's psych meds started again.     Pt's father, Angelo Bowman, lives in [place] and called for an update on pt around 2100. Father would like to be called with any changes in condition [phone].

## 2017-04-16 NOTE — DISCHARGE PLANNING
Trauma Response    Referral: Trauma red Response    Intervention: SW responded to trauma red.  Pt was BIB [facility] after being found down by a dumpster. Bystanders watched pt collapsed and called 911.  Pt was unresponsive upon arrival.  Pts name is Mago Bowman (: 1990 or 1990).  SW obtained the following pt information:Per Pt's ID, she is from [place], [place]. No emergency contact information for pt listed from previous admission. No family contact information listed in [employer]. MSW called [facility] and requested if pt was ever there and had NOK information on file. [facility] stated pt's mother Gissel Bowman ([phone]) is pt's emergency contact. MSW called Gissel and updated her on pt's status. Gissel requested pt be made full code and that she will be driving down from [place] shortly. MSW notified ERP of request.     Plan: Remain available for support

## 2017-04-16 NOTE — PROGRESS NOTES
Med rec complete per patient's family member.  Allergies reviewed.  No antibiotics in last 30 days.

## 2017-04-17 ENCOUNTER — APPOINTMENT (OUTPATIENT)
Dept: RADIOLOGY | Facility: MEDICAL CENTER | Age: 27
DRG: 917 | End: 2017-04-17
Attending: STUDENT IN AN ORGANIZED HEALTH CARE EDUCATION/TRAINING PROGRAM
Payer: COMMERCIAL

## 2017-04-17 ENCOUNTER — APPOINTMENT (OUTPATIENT)
Dept: RADIOLOGY | Facility: MEDICAL CENTER | Age: 27
DRG: 917 | End: 2017-04-17
Attending: INTERNAL MEDICINE
Payer: COMMERCIAL

## 2017-04-17 LAB
ALBUMIN SERPL BCP-MCNC: 2.9 G/DL (ref 3.2–4.9)
ALBUMIN/GLOB SERPL: 1.2 G/DL
ALP SERPL-CCNC: 66 U/L (ref 30–99)
ALT SERPL-CCNC: 9 U/L (ref 2–50)
ANION GAP SERPL CALC-SCNC: 7 MMOL/L (ref 0–11.9)
AST SERPL-CCNC: 13 U/L (ref 12–45)
BASE EXCESS BLDA CALC-SCNC: -7 MMOL/L (ref -4–3)
BASOPHILS # BLD AUTO: 0.5 % (ref 0–1.8)
BASOPHILS # BLD: 0.05 K/UL (ref 0–0.12)
BILIRUB SERPL-MCNC: 0.6 MG/DL (ref 0.1–1.5)
BODY TEMPERATURE: ABNORMAL DEGREES
BUN SERPL-MCNC: 6 MG/DL (ref 8–22)
CALCIUM SERPL-MCNC: 8.6 MG/DL (ref 8.5–10.5)
CHLORIDE SERPL-SCNC: 114 MMOL/L (ref 96–112)
CO2 BLDA-SCNC: 19 MMOL/L (ref 20–33)
CO2 SERPL-SCNC: 19 MMOL/L (ref 20–33)
CREAT SERPL-MCNC: 0.7 MG/DL (ref 0.5–1.4)
CRP SERPL HS-MCNC: 5.49 MG/DL (ref 0–0.75)
EKG IMPRESSION: NORMAL
EKG IMPRESSION: NORMAL
EOSINOPHIL # BLD AUTO: 0.08 K/UL (ref 0–0.51)
EOSINOPHIL NFR BLD: 0.8 % (ref 0–6.9)
ERYTHROCYTE [DISTWIDTH] IN BLOOD BY AUTOMATED COUNT: 46.3 FL (ref 35.9–50)
GFR SERPL CREATININE-BSD FRML MDRD: >60 ML/MIN/1.73 M 2
GLOBULIN SER CALC-MCNC: 2.5 G/DL (ref 1.9–3.5)
GLUCOSE SERPL-MCNC: 94 MG/DL (ref 65–99)
HCO3 BLDA-SCNC: 18.3 MMOL/L (ref 17–25)
HCT VFR BLD AUTO: 32.4 % (ref 37–47)
HGB BLD-MCNC: 10.7 G/DL (ref 12–16)
IMM GRANULOCYTES # BLD AUTO: 0.03 K/UL (ref 0–0.11)
IMM GRANULOCYTES NFR BLD AUTO: 0.3 % (ref 0–0.9)
INR PPP: 1.29 (ref 0.87–1.13)
LYMPHOCYTES # BLD AUTO: 1.27 K/UL (ref 1–4.8)
LYMPHOCYTES NFR BLD: 12.9 % (ref 22–41)
MAGNESIUM SERPL-MCNC: 1.9 MG/DL (ref 1.5–2.5)
MCH RBC QN AUTO: 30.1 PG (ref 27–33)
MCHC RBC AUTO-ENTMCNC: 33 G/DL (ref 33.6–35)
MCV RBC AUTO: 91.3 FL (ref 81.4–97.8)
MONOCYTES # BLD AUTO: 1.08 K/UL (ref 0–0.85)
MONOCYTES NFR BLD AUTO: 11 % (ref 0–13.4)
NEUTROPHILS # BLD AUTO: 7.3 K/UL (ref 2–7.15)
NEUTROPHILS NFR BLD: 74.5 % (ref 44–72)
NRBC # BLD AUTO: 0 K/UL
NRBC BLD AUTO-RTO: 0 /100 WBC
O2/TOTAL GAS SETTING VFR VENT: 30 %
PCO2 BLDA: 34.5 MMHG (ref 26–37)
PCO2 TEMP ADJ BLDA: 33.5 MMHG (ref 26–37)
PH BLDA: 7.33 [PH] (ref 7.4–7.5)
PH TEMP ADJ BLDA: 7.34 [PH] (ref 7.4–7.5)
PHOSPHATE SERPL-MCNC: 3.2 MG/DL (ref 2.5–4.5)
PLATELET # BLD AUTO: 189 K/UL (ref 164–446)
PMV BLD AUTO: 9.2 FL (ref 9–12.9)
PO2 BLDA: 51 MMHG (ref 64–87)
PO2 TEMP ADJ BLDA: 48 MMHG (ref 64–87)
POTASSIUM SERPL-SCNC: 3.8 MMOL/L (ref 3.6–5.5)
PREALB SERPL-MCNC: 20 MG/DL (ref 18–38)
PROT SERPL-MCNC: 5.4 G/DL (ref 6–8.2)
PROTHROMBIN TIME: 16.5 SEC (ref 12–14.6)
RBC # BLD AUTO: 3.55 M/UL (ref 4.2–5.4)
SAO2 % BLDA: 83 % (ref 93–99)
SODIUM SERPL-SCNC: 140 MMOL/L (ref 135–145)
SPECIMEN DRAWN FROM PATIENT: ABNORMAL
WBC # BLD AUTO: 9.8 K/UL (ref 4.8–10.8)

## 2017-04-17 PROCEDURE — 93010 ELECTROCARDIOGRAM REPORT: CPT | Performed by: INTERNAL MEDICINE

## 2017-04-17 PROCEDURE — 700102 HCHG RX REV CODE 250 W/ 637 OVERRIDE(OP): Performed by: STUDENT IN AN ORGANIZED HEALTH CARE EDUCATION/TRAINING PROGRAM

## 2017-04-17 PROCEDURE — A9270 NON-COVERED ITEM OR SERVICE: HCPCS | Performed by: INTERNAL MEDICINE

## 2017-04-17 PROCEDURE — 94690 O2 UPTK REST INDIRECT: CPT

## 2017-04-17 PROCEDURE — 85025 COMPLETE CBC W/AUTO DIFF WBC: CPT

## 2017-04-17 PROCEDURE — 80053 COMPREHEN METABOLIC PANEL: CPT

## 2017-04-17 PROCEDURE — 82803 BLOOD GASES ANY COMBINATION: CPT

## 2017-04-17 PROCEDURE — A9270 NON-COVERED ITEM OR SERVICE: HCPCS | Performed by: STUDENT IN AN ORGANIZED HEALTH CARE EDUCATION/TRAINING PROGRAM

## 2017-04-17 PROCEDURE — 83735 ASSAY OF MAGNESIUM: CPT

## 2017-04-17 PROCEDURE — 700102 HCHG RX REV CODE 250 W/ 637 OVERRIDE(OP): Performed by: INTERNAL MEDICINE

## 2017-04-17 PROCEDURE — 99291 CRITICAL CARE FIRST HOUR: CPT | Mod: GC | Performed by: INTERNAL MEDICINE

## 2017-04-17 PROCEDURE — 700101 HCHG RX REV CODE 250: Performed by: INTERNAL MEDICINE

## 2017-04-17 PROCEDURE — 700111 HCHG RX REV CODE 636 W/ 250 OVERRIDE (IP): Performed by: STUDENT IN AN ORGANIZED HEALTH CARE EDUCATION/TRAINING PROGRAM

## 2017-04-17 PROCEDURE — 86140 C-REACTIVE PROTEIN: CPT

## 2017-04-17 PROCEDURE — 700111 HCHG RX REV CODE 636 W/ 250 OVERRIDE (IP): Performed by: INTERNAL MEDICINE

## 2017-04-17 PROCEDURE — 85610 PROTHROMBIN TIME: CPT

## 2017-04-17 PROCEDURE — 770022 HCHG ROOM/CARE - ICU (200)

## 2017-04-17 PROCEDURE — 71010 DX-CHEST-PORTABLE (1 VIEW): CPT

## 2017-04-17 PROCEDURE — 306558 VEST RESTRAINT (MEDIUM): Performed by: INTERNAL MEDICINE

## 2017-04-17 PROCEDURE — 93005 ELECTROCARDIOGRAM TRACING: CPT | Performed by: STUDENT IN AN ORGANIZED HEALTH CARE EDUCATION/TRAINING PROGRAM

## 2017-04-17 PROCEDURE — 94003 VENT MGMT INPAT SUBQ DAY: CPT

## 2017-04-17 PROCEDURE — 84134 ASSAY OF PREALBUMIN: CPT

## 2017-04-17 PROCEDURE — 700105 HCHG RX REV CODE 258: Performed by: INTERNAL MEDICINE

## 2017-04-17 PROCEDURE — 84100 ASSAY OF PHOSPHORUS: CPT

## 2017-04-17 PROCEDURE — 700105 HCHG RX REV CODE 258: Performed by: STUDENT IN AN ORGANIZED HEALTH CARE EDUCATION/TRAINING PROGRAM

## 2017-04-17 RX ORDER — MAGNESIUM SULFATE HEPTAHYDRATE 40 MG/ML
2 INJECTION, SOLUTION INTRAVENOUS ONCE
Status: COMPLETED | OUTPATIENT
Start: 2017-04-17 | End: 2017-04-17

## 2017-04-17 RX ORDER — POTASSIUM CHLORIDE 14.9 MG/ML
20 INJECTION INTRAVENOUS ONCE
Status: COMPLETED | OUTPATIENT
Start: 2017-04-17 | End: 2017-04-17

## 2017-04-17 RX ADMIN — SODIUM CHLORIDE: 9 INJECTION, SOLUTION INTRAVENOUS at 02:36

## 2017-04-17 RX ADMIN — MAGNESIUM SULFATE IN WATER 2 G: 40 INJECTION, SOLUTION INTRAVENOUS at 08:13

## 2017-04-17 RX ADMIN — DEXMEDETOMIDINE HYDROCHLORIDE 1.2 MCG/KG/HR: 100 INJECTION, SOLUTION, CONCENTRATE INTRAVENOUS at 04:45

## 2017-04-17 RX ADMIN — FAMOTIDINE 20 MG: 10 INJECTION, SOLUTION INTRAVENOUS at 08:13

## 2017-04-17 RX ADMIN — LIDOCAINE HYDROCHLORIDE 2 ML: 10 INJECTION, SOLUTION INFILTRATION; PERINEURAL at 04:48

## 2017-04-17 RX ADMIN — STANDARDIZED SENNA CONCENTRATE AND DOCUSATE SODIUM 2 TABLET: 8.6; 5 TABLET, FILM COATED ORAL at 21:09

## 2017-04-17 RX ADMIN — POTASSIUM CHLORIDE 20 MEQ: 14.9 INJECTION, SOLUTION INTRAVENOUS at 08:13

## 2017-04-17 RX ADMIN — SODIUM CHLORIDE: 9 INJECTION, SOLUTION INTRAVENOUS at 22:22

## 2017-04-17 RX ADMIN — NICOTINE 14 MG: 14 PATCH TRANSDERMAL at 05:49

## 2017-04-17 RX ADMIN — SODIUM CHLORIDE: 9 INJECTION, SOLUTION INTRAVENOUS at 15:42

## 2017-04-17 RX ADMIN — HEPARIN SODIUM 5000 UNITS: 5000 INJECTION, SOLUTION INTRAVENOUS; SUBCUTANEOUS at 05:49

## 2017-04-17 RX ADMIN — SODIUM CHLORIDE: 9 INJECTION, SOLUTION INTRAVENOUS at 13:09

## 2017-04-17 RX ADMIN — ENOXAPARIN SODIUM 40 MG: 100 INJECTION SUBCUTANEOUS at 13:08

## 2017-04-17 RX ADMIN — BENZOCAINE AND MENTHOL 1 LOZENGE: 15; 3.6 LOZENGE ORAL at 21:09

## 2017-04-17 ASSESSMENT — LIFESTYLE VARIABLES
EVER_SMOKED: YES
SUBSTANCE_ABUSE: 1

## 2017-04-17 ASSESSMENT — PAIN SCALES - GENERAL
PAINLEVEL_OUTOF10: 2
PAINLEVEL_OUTOF10: 3
PAINLEVEL_OUTOF10: 2
PAINLEVEL_OUTOF10: 3
PAINLEVEL_OUTOF10: 2

## 2017-04-17 ASSESSMENT — ENCOUNTER SYMPTOMS
HEADACHES: 0
FEVER: 0
HEARTBURN: 0
CHILLS: 0
PALPITATIONS: 0
NAUSEA: 0

## 2017-04-17 ASSESSMENT — COPD QUESTIONNAIRES
HAVE YOU SMOKED AT LEAST 100 CIGARETTES IN YOUR ENTIRE LIFE: YES
DO YOU EVER COUGH UP ANY MUCUS OR PHLEGM?: NO/ONLY WITH OCCASIONAL COLDS OR INFECTIONS
COPD SCREENING SCORE: 2
DURING THE PAST 4 WEEKS HOW MUCH DID YOU FEEL SHORT OF BREATH: NONE/LITTLE OF THE TIME

## 2017-04-17 NOTE — PROGRESS NOTES
Pulmonary Critical Care Progress Note    Interval Events:  24 hour interval history reviewed  Reason for visit:  Respiratory failure, acute metabolic encephalopathy  Unable to provide CC or ROS  Seen with [employer] [facility]      SR  CXR clear  Lungs clear  Dex 1.5  Wean off sedation and extubated  Psych consult      PFSH:  No change.    Respiratory:  Key Vent Mode: APVCMV, Rate (breaths/min): 16, Vt Target (mL): 380, PEEP/CPAP: 8, FiO2: 30, Static Compliance (ml / cm H2O): 55.9, Control VTE (exp VT): 385  Pulse Oximetry: 100 %  CXR clear  Lungs clear     Recent Labs      04/15/17   1955   04/16/17   0448  04/16/17   1034  04/17/17   0454   [id number]  7.40   --    --    --    --    [id number]  30.1   --    --    --    --    [id number]  333.1*   --    --    --    --    [id number]  99.0   --    --    --    --    ARTHCO3  18   --    --    --    --    ARTBE  -5*   --    --    --    --    ISTATAPH   --    < >  7.288*  7.390*  7.331*   ISTATAPCO2   --    < >  31.9  25.2*  34.5   ISTATAPO2   --    < >  132*  131*  51*   ISTATATCO2   --    < >  16*  16*  19*   [id number]   --    < >  99  99  83*   ISTATARTHCO3   --    < >  15.3*  15.2*  18.3   ISTATARTBE   --    < >  -10*  -9*  -7*   ISTATTEMP   --    < >  96.7 F  97.7 F  97.3 F   ISTATFIO2   --    < >  .30  30  30   ISTATSPEC   --    < >  Arterial  Arterial  Arterial   ISTATAPHTC   --    < >  7.303*  7.398*  7.341*   [id number]   --    < >  125*  128*  48*    < > = values in this interval not displayed.       HemoDynamics:  Pulse: 65, Heart Rate (Monitored): 66  NIBP: 126/87 mmHg     SR    Neuro:  Sedated, agitated at times  Precedex - 1.5    Fluids:  Intake/Output       04/15/17 0700 - 04/16/17 0659 04/16/17 0700 - 04/17/17 0659 04/17/17 0700 - 04/18/17 0659      0700-1859 1900-0659 Total 0700-1859 1900-0659 Total 0700-1859 1900-0659 Total       Intake    I.V.  --  4123 4123  2340.8  1944.6 4285.4  --  -- --    Trauma Resuscitation Volume -- 1500 1500 -- -- -- -- -- --     Precedex Volume -- 23 23 40.8 144.6 185.4 -- -- --    IV Volume (NS) -- 2200 2200 2100 1800 3900 -- -- --    IV Piggyback Volume (IV Piggyback) -- 400 400 200 -- 200 -- -- --    Total Intake -- 4123 4123 2340.8 1944.6 4285.4 -- -- --       Output    Urine  --  1200 1200  3489  1325 4814  --  -- --    Indwelling Cathether -- 1200 1200 3489 1325 4814 -- -- --    Drains  --  350 350  350  150 500  --  -- --    Oral Gastric Tube -- 350 350 350 150 500 -- -- --    Stool  --  -- --  --  -- --  --  -- --    Number of Times Stooled -- 0 x 0 x 0 x 0 x 0 x -- -- --    Total Output -- 1550 1550 3839 1475 5314 -- -- --       Net I/O     -- 2573 2573 -1498.2 469.6 -1028.6 -- -- --           Recent Labs      04/15/17   1955  04/16/17   0500   SODIUM  136  139   POTASSIUM  2.6*  3.7   CHLORIDE  105  117*   CO2  18*  17*   BUN  15  10   CREATININE  0.78  0.50   MAGNESIUM  1.9  1.7   PHOSPHORUS   --   2.7   CALCIUM  9.3  7.6*       GI/Nutrition:  Abd soft ND/NT    Liver Function  Recent Labs      04/15/17   1955  04/16/17   0500   ALTSGPT  11  7   ASTSGOT  12  9*   ALKPHOSPHAT  78  56   TBILIRUBIN  0.9  0.8   GLUCOSE  215*  100*       Heme:  Recent Labs      04/15/17   1955  04/16/17   0500  17   1318  17   0600   RBC  4.26  3.17*   --   3.55*   HEMOGLOBIN  12.9  9.6*  10.3*  10.7*   HEMATOCRIT  38.1  29.2*  31.0*  32.4*   PLATELETCT  226  151*   --   189   PROTHROMBTM  15.5*   --    --    --    APTT  24.7   --    --    --    INR  1.19*   --    --    --        Infectious Disease:  Temp  Av.2 °C (98.9 °F)  Min: 36.4 °C (97.5 °F)  Max: 37.6 °C (99.7 °F)    Recent Labs      04/15/17   1955  04/16/17   0500  17   0600   WBC  5.0  4.5*  9.8   NEUTSPOLYS   --   51.50  74.50*   LYMPHOCYTES   --   32.30  12.90*   MONOCYTES   --   14.30*  11.00   EOSINOPHILS   --   1.10  0.80   BASOPHILS   --   0.40  0.50   ASTSGOT  12  9*   --    ALTSGPT  11  7   --    ALKPHOSPHAT  78  56   --    TBILIRUBIN  0.9  0.8   --      Current  Facility-Administered Medications   Medication Dose Frequency Provider Last Rate Last Dose   • norepinephrine (LEVOPHED) 8 mg in  mL Infusion  0.5-30 mcg/min Continuous Chris Hernandez M.D.   Stopped at 04/16/17 0115   • famotidine (PEPCID) tablet 20 mg  20 mg BID Angelo Sandy M.D.        Or   • famotidine (PEPCID) injection 20 mg  20 mg BID Angelo Sandy M.D.   20 mg at 04/16/17 2009   • NS infusion   Continuous Miller Merritt M.D. 150 mL/hr at 04/17/17 0236     • senna-docusate (PERICOLACE or SENOKOT S) 8.6-50 MG per tablet 2 Tab  2 Tab BID Chris Hernandez M.D.   Stopped at 04/15/17 2200    And   • polyethylene glycol/lytes (MIRALAX) PACKET 1 Packet  1 Packet QDAY PRN Chris Hernandez M.D.        And   • magnesium hydroxide (MILK OF MAGNESIA) suspension 30 mL  30 mL QDAY PRN Chris Hernandez M.D.       • heparin injection 5,000 Units  5,000 Units Q8HRS Chris Hernandez M.D.   5,000 Units at 04/17/17 0549   • ondansetron (ZOFRAN) syringe/vial injection 4 mg  4 mg Q4HRS PRN Chris Hernandez M.D.       • prochlorperazine (COMPAZINE) injection 5-10 mg  5-10 mg Q4HRS PRN Chris Hernandez M.D.       • acetaminophen (TYLENOL) tablet 650 mg  650 mg Q6HRS PRN Chris Hernandez M.D.       • nicotine (NICODERM) 14 MG/24HR 14 mg  14 mg Daily-0600 Chris Hernandez M.D.   14 mg at 04/17/17 0549    And   • nicotine polacrilex (NICORETTE) 2 MG piece 2 mg  2 mg Q HOUR PRN Chris Hernandez M.D.       • Respiratory Care per Protocol   Continuous RT Joao Jackson M.D.       • lactulose 20 GM/30ML solution 30 mL  30 mL Q24HRS PRN Joao Jackson M.D.       • bisacodyl (DULCOLAX) suppository 10 mg  10 mg Q24HRS PRN Joao Jackson M.D.       • fleet enema 133 mL  1 Each Once PRN Joao Jackson M.D.       • chlorhexidine (PERIDEX) 0.12 % solution 15 mL  15 mL BID Joao Jackson M.D.   15 mL at 04/16/17 2009   • lidocaine (XYLOCAINE) 1%  injection  1-2 mL Q30 MIN PRN Joao Jackson M.D.   2 mL at 04/17/17 0448   • MD  ALERT...Adult ICU Electrolyte Replacement per Pharmacy Protocol   pharmacy to dose Joao Jackson M.D.       • ipratropium-albuterol (DUONEB) nebulizer solution 3 mL  3 mL Q2HRS PRN (RT) Joao Jackson M.D.       • dexmedetomidine (PRECEDEX) 200 mcg in NS 50 mL infusion  0.1-1.5 mcg/kg/hr Continuous Joao Jackson M.D. 24.4 mL/hr at 04/17/17 0447 1.5 mcg/kg/hr at 04/17/17 0447     Last reviewed on 4/16/2017  2:37 PM by Sara Rodriguez, Pharmacy Int    Quality  Measures:  Labs reviewed, Medications reviewed and Radiology images reviewed  Mckenzie catheter: Critically Ill - Requiring Accurate Measurement of Urinary Output  Central line in place: Need for access    DVT Prophylaxis: Enoxaparin (Lovenox)  DVT prophylaxis - mechanical: SCDs  Ulcer prophylaxis: Yes            Assessment and Plan:    VDRF - intubated 4/15   - tolerating SBT - liberate  Acute metabolic encephalopathy   - head CT negative   - presumed drug overdose (possible lithium, Seroquel)    - improved   - ammonia normal  Positive urine drug screen for amphetamines  Psychiatric disorders - extensive history   - psychiatry eval after liberated from vent    Critical Care Time:  35 minutes  Date of service:  4/17/17  23754  No time overlap  Time excludes procedures  Discussed with RN, RT, Team

## 2017-04-17 NOTE — PROGRESS NOTES
[facility] GOLD PROGRESS NOTE               Author: Miller Merritt Date & Time created: 4/17/2017  3:39 PM           Diagnosis:  Altered mental status,/likely drug overdose  Acute toxic/metabolic encephalopathy  Acute hypoxic respiratory failure  Sinus tachycardia  Amphetamine abuse    History of suicidal attempts  History of substance abuse  History of uterine orders  Extensive psych history  PTSD  schizoaffective disorder  self mutilation  Hypokalemia /hypomagnesemia   Lactic acidosis   Coagulopathy  Anemia    ID:  26-year-old female with above-mentioned past medical history was blocking the EMS after a witnessed ground-level fall outside her apartment with a bag of lithium and Seroquel. She stopped taking Li and seroquel 2 weeks ago. However had refilled her lithium and Seroquel 2 days ago. Her lithium level is undetectable. She is intubated for airway protection and urine toxicology is positive for amphetamine. CT had an cervical spine is negative.      Interval History:  Extubated, doing well, watch in ICU today  Extend legal hold, follow psych recs  QTC is normal  Hb stable      Review of Systems:  Review of Systems   Constitutional: Negative for fever and chills.   HENT: Negative for hearing loss.    Cardiovascular: Negative for chest pain and palpitations.   Gastrointestinal: Negative for heartburn and nausea.   Genitourinary: Negative for dysuria and urgency.   Neurological: Negative for headaches.   Psychiatric/Behavioral: Positive for suicidal ideas and substance abuse.       Physical Exam:  Physical Exam   Constitutional: She appears well-developed.   Eyes: Pupils are equal, round, and reactive to light.   Neck: No tracheal deviation present. No thyromegaly present.   Cardiovascular: Normal rate.  Exam reveals no gallop and no friction rub.    No murmur heard.  Pulmonary/Chest: No respiratory distress. She has no wheezes. She has no rales.   Abdominal: She exhibits no distension. There is no tenderness.    Genitourinary: Guaiac negative stool.   Musculoskeletal: She exhibits no edema.   Neurological: No cranial nerve deficit.   Intubated/sedated       Labs:  Recent Labs      04/15/17   1955   04/16/17   0448  04/16/17   1034  04/17/17   0454   IWKYF51Q  7.40   --    --    --    --    ZUBMUU970I  30.1   --    --    --    --    DMFSS925X  333.1*   --    --    --    --    QRFS1ZDD  99.0   --    --    --    --    ARTHCO3  18   --    --    --    --    ARTBE  -5*   --    --    --    --    ISTATAPH   --    < >  7.288*  7.390*  7.331*   ISTATAPCO2   --    < >  31.9  25.2*  34.5   ISTATAPO2   --    < >  132*  131*  51*   ISTATATCO2   --    < >  16*  16*  19*   [id number]   --    < >  99  99  83*   ISTATARTHCO3   --    < >  15.3*  15.2*  18.3   ISTATARTBE   --    < >  -10*  -9*  -7*   ISTATTEMP   --    < >  96.7 F  97.7 F  97.3 F   ISTATFIO2   --    < >  .30  30  30   ISTATSPEC   --    < >  Arterial  Arterial  Arterial   ISTATAPHTC   --    < >  7.303*  7.398*  7.341*   [id number]   --    < >  125*  128*  48*    < > = values in this interval not displayed.         Recent Labs      04/15/17   1955  04/16/17   0500  04/17/17   0600   SODIUM  136  139  140   POTASSIUM  2.6*  3.7  3.8   CHLORIDE  105  117*  114*   CO2  18*  17*  19*   BUN  15  10  6*   CREATININE  0.78  0.50  0.70   MAGNESIUM  1.9  1.7  1.9   PHOSPHORUS   --   2.7  3.2   CALCIUM  9.3  7.6*  8.6     Recent Labs      04/15/17   1955  04/16/17   0500  04/17/17   0600   ALTSGPT  11  7  9   ASTSGOT  12  9*  13   ALKPHOSPHAT  78  56  66   TBILIRUBIN  0.9  0.8  0.6   PREALBUMIN   --    --   20.0   GLUCOSE  215*  100*  94     Recent Labs      04/15/17   1955  04/16/17   0500  04/16/17   1318  04/17/17   0600   RBC  4.26  3.17*   --   3.55*   HEMOGLOBIN  12.9  9.6*  10.3*  10.7*   HEMATOCRIT  38.1  29.2*  31.0*  32.4*   PLATELETCT  226  151*   --   189   PROTHROMBTM  15.5*   --    --   16.5*   APTT  24.7   --    --    --    INR  1.19*   --    --   1.29*     Recent Labs       04/15/17   1955  17   0500  17   0600   WBC  5.0  4.5*  9.8   NEUTSPOLYS   --   51.50  74.50*   LYMPHOCYTES   --   32.30  12.90*   MONOCYTES   --   14.30*  11.00   EOSINOPHILS   --   1.10  0.80   BASOPHILS   --   0.40  0.50   ASTSGOT  12  9*  13   ALTSGPT  11  7  9   ALKPHOSPHAT  78  56  66   TBILIRUBIN  0.9  0.8  0.6           Hemodynamics:  Temp (24hrs), Av.3 °C (99.1 °F), Min:36.5 °C (97.7 °F), Max:37.6 °C (99.7 °F)  Temperature: 37.4 °C (99.3 °F)  Pulse  Av.5  Min: 65  Max: 143Heart Rate (Monitored): (!) 101  NIBP: 111/66 mmHg     Did not require pressors,  Respiratory:    Respiration: 17, Pulse Oximetry: 98 %, O2 Daily Delivery Respiratory : Silicone Nasal Cannula    intubated/sedated  Decrease FiO2 and SBT; follow for extubation    Fluids:    Intake/Output Summary (Last 24 hours) at 17 1539  Last data filed at 17 1200   Gross per 24 hour   Intake 3511.54 ml   Output   5498 ml   Net -1986.46 ml     Weight: 65.8 kg (145 lb 1 oz)  Normal saline at 125     GI/Nutrition:  Orders Placed This Encounter   Procedures   • DIET ORDER     Standing Status: Standing      Number of Occurrences: 1      Standing Expiration Date:      Order Specific Question:  Diet:     Answer:  Regular [1]    nothing by mouth  OG for now  If no extubation will put core track    Medical Decision Making, by Problem:  Active Hospital Problems    Diagnosis   • Respiratory failure (CMS-HCC) [J96.90]   • Altered mental status [R41.82]      Altered mental status,/likely drug overdose  Acute toxic/metabolic encephalopathy  suspect drug overdose /Acute toxic/metabolic encephalopathy   suspect polysubstance o/d  Extubated, doing well, watch in ICU today  Extend legal hold, follow psych recs  QTC is normal      Acute hypoxic respiratory failure  intubated for airway protection/VDRF  Extubated, doing well, watch in ICU today        Sinus tachy  better with IVF, BP soft  Follow EKG  Continue intravenous  fluids    Amphetamine abuse    History of suicidal attempts  History of substance abuse  History of uterine orders  Extensive psych history  PTSD  schizoaffective d/o  self mutilation  Institutionalized as a kid  Stop taking lithium and Seroquel a few weeks ago  Started intravenous drug abuse recently  History of suicidal attempts  Poison control contacted  Extend legal hold, follow psych recs  QTC is normal        Hypokalemia /hypomagnesemia   Lactic acidosis   Resolved  Replete as needed      Coagulopathy  Repeat PT/INR  PT 15.5, INR 1.90     Anemia  Likely dilutional  stable       EKG reviewed, Radiology images reviewed, Labs reviewed and Medications reviewed  Mckenzie catheter: Critically Ill - Requiring Accurate Measurement of Urinary Output      DVT Prophylaxis: Enoxaparin (Lovenox)  DVT prophylaxis - mechanical: SCDs  Ulcer prophylaxis: Yes

## 2017-04-17 NOTE — PROGRESS NOTES
[facility] SUMMARY    Transfer to Gray Team    Diagnosis:  Altered mental status, likely drug overdose Li, Seroquel with acute toxic/metabolic encephalopathy  Acute hypoxic respiratory failure due to AMS  Sinus tachycardia/Hypotension/Lactic acidosis --Resolved  Amphetamine abuse /IVDA  History of suicidal attempts/History of substance abuse/PTSD/schizoaffective disorder/self mutilation  Hypokalemia /hypomagnesemia/hypophasphatemia  Coagulopathy--F/u  Anemia--menstruation and hemodilution      26-year-old young lady with above-mentioned past medical history, difficult social situation, was institutionalized for her psych problems for long time, currently on Seroquel and lithium which she stopped taking 2 weeks prior to admission, was admitted after a witnessed ground-level fall outside her apartment with a bag of lithium and Seroquel by her side.     She stopped taking Li and seroquel 2 weeks ago. However had refilled her lithium and Seroquel 2 days ago. Her lithium level was undetectable. Urine toxicology was positive for amphetamine. She was initially hypotensive and tachycardic which resolved after intravenous fluids. She was intubated for airway protection and was extubated yesterday. She is doing fine after extubation and complaints of mild throat discomfort which improved with Cepacol.  She is tolerating diet and her tachycardia is resolved. Intravenous fluids were stopped. Electrolytes including mag, K and phosphorus are being repleted. Psychiatry was consulted. She is on legal hold ly with one-on-one sitter. Referred to mental health facility has been extended. [place] poison control [place] and case number is [id number]. Her QTC has been stable and she has been in normal sinus rhythm and is on room air.    CT had an cervical spine, ammonia was unremarkable.    Follow-up  Legal hold, one-on-one sitter, follow psych recommendations for psych related medications  Follow-up on referral for mental  health facility  Advance diet as tolerated  Repletet electrolytes  PT/OT  Seizure/aspiration/Fall precautions  Repeat EKG for QTC

## 2017-04-17 NOTE — CARE PLAN
Problem: Safety  Goal: Will remain free from falls  Outcome: PROGRESSING AS EXPECTED  Patient will not fall for the entirety of shift. Interventions: call light with in reach, q1 hour rounding and repositioning, see restraint charting in flowsheets, bed in lowest position, bed alarms on, patient near nursing station and q4 hour neuro checks.    Problem: Respiratory:  Goal: Respiratory status will improve  Outcome: PROGRESSING AS EXPECTED  Patient will continue to progress with ventilation and oxygenation after extubation. Interventions : continually oxygen saturation monitoring, q1-2 hour vital signs, q4 hour observation, collaborate with rt and interdisciplinary team, and encourage patient to deep breath cough and use incentive spirometer.

## 2017-04-17 NOTE — DISCHARGE PLANNING
Legal Hold            Where was patient when legal hold initiated: [facility]    Legal Hold will : 17 1300    Medical Clearance Complete: {YES    Psychiatric Certification Complete: {YES    Paperwork sent to  for extension: {YES    What doctor will be signing the extension: [place]    Extension paperwork attained: {NO    Will patient present to [facility] mental health on Wednesday morning?: {YES    Referral placed to Psychiatric Facility: {YES      Ongoing Plan: IP Psych facility when accepted.

## 2017-04-17 NOTE — PROGRESS NOTES
12 hour chart check. Assumed care of patient at 0700. Received bedside report from REHANA Dawn. Patient comfortable in bed, sedate, intubated and restraints verified with no signs of distress at this time. No family at bedside. Appropriate alarms set. Dr. Merritt at bedside discussed patients condition (need for pain medication and alternative sedation). Awaiting orders.  Rates verified.  Will continue to closely monitor.

## 2017-04-17 NOTE — CARE PLAN
Problem: Knowledge Deficit  Goal: Knowledge of disease process/condition, treatment plan, diagnostic tests, and medications will improve  Outcome: PROGRESSING AS EXPECTED  Treatment plan, disease process and current condition, tests, and medications discussed with family. Family verbalizes understanding. Education documented. Unit routine and plan of care discussed. No further questions.    Problem: Skin Integrity  Goal: Risk for impaired skin integrity will decrease  Outcome: PROGRESSING AS EXPECTED  Skin integrity, appearance, temperature and risk factors for impaired skin integrity assessed and monitored throughout shift. Q2hour turns in place. Pillows in use for support and positioning.

## 2017-04-18 LAB
ANION GAP SERPL CALC-SCNC: 7 MMOL/L (ref 0–11.9)
BASOPHILS # BLD AUTO: 0 % (ref 0–1.8)
BASOPHILS # BLD: 0 K/UL (ref 0–0.12)
BUN SERPL-MCNC: 3 MG/DL (ref 8–22)
CALCIUM SERPL-MCNC: 8.4 MG/DL (ref 8.5–10.5)
CHLORIDE SERPL-SCNC: 111 MMOL/L (ref 96–112)
CO2 SERPL-SCNC: 24 MMOL/L (ref 20–33)
CREAT SERPL-MCNC: 0.6 MG/DL (ref 0.5–1.4)
EOSINOPHIL # BLD AUTO: 0 K/UL (ref 0–0.51)
EOSINOPHIL NFR BLD: 0 % (ref 0–6.9)
ERYTHROCYTE [DISTWIDTH] IN BLOOD BY AUTOMATED COUNT: 44.9 FL (ref 35.9–50)
GFR SERPL CREATININE-BSD FRML MDRD: >60 ML/MIN/1.73 M 2
GLUCOSE SERPL-MCNC: 103 MG/DL (ref 65–99)
HCT VFR BLD AUTO: 33.3 % (ref 37–47)
HGB BLD-MCNC: 10.8 G/DL (ref 12–16)
LYMPHOCYTES # BLD AUTO: 2.14 K/UL (ref 1–4.8)
LYMPHOCYTES NFR BLD: 22.1 % (ref 22–41)
MAGNESIUM SERPL-MCNC: 1.9 MG/DL (ref 1.5–2.5)
MANUAL DIFF BLD: NORMAL
MCH RBC QN AUTO: 29.5 PG (ref 27–33)
MCHC RBC AUTO-ENTMCNC: 32.4 G/DL (ref 33.6–35)
MCV RBC AUTO: 91 FL (ref 81.4–97.8)
MONOCYTES # BLD AUTO: 0.6 K/UL (ref 0–0.85)
MONOCYTES NFR BLD AUTO: 6.2 % (ref 0–13.4)
MORPHOLOGY BLD-IMP: NORMAL
NEUTROPHILS # BLD AUTO: 6.95 K/UL (ref 2–7.15)
NEUTROPHILS NFR BLD: 71.7 % (ref 44–72)
NRBC # BLD AUTO: 0 K/UL
NRBC BLD AUTO-RTO: 0 /100 WBC
PHOSPHATE SERPL-MCNC: 2.4 MG/DL (ref 2.5–4.5)
PLATELET # BLD AUTO: 225 K/UL (ref 164–446)
PLATELET BLD QL SMEAR: NORMAL
PMV BLD AUTO: 9.8 FL (ref 9–12.9)
POTASSIUM SERPL-SCNC: 3.6 MMOL/L (ref 3.6–5.5)
RBC # BLD AUTO: 3.66 M/UL (ref 4.2–5.4)
RBC BLD AUTO: NORMAL
SODIUM SERPL-SCNC: 142 MMOL/L (ref 135–145)
WBC # BLD AUTO: 9.7 K/UL (ref 4.8–10.8)

## 2017-04-18 PROCEDURE — 700105 HCHG RX REV CODE 258: Performed by: STUDENT IN AN ORGANIZED HEALTH CARE EDUCATION/TRAINING PROGRAM

## 2017-04-18 PROCEDURE — 700102 HCHG RX REV CODE 250 W/ 637 OVERRIDE(OP): Performed by: INTERNAL MEDICINE

## 2017-04-18 PROCEDURE — 770006 HCHG ROOM/CARE - MED/SURG/GYN SEMI*

## 2017-04-18 PROCEDURE — A9270 NON-COVERED ITEM OR SERVICE: HCPCS | Performed by: STUDENT IN AN ORGANIZED HEALTH CARE EDUCATION/TRAINING PROGRAM

## 2017-04-18 PROCEDURE — 700102 HCHG RX REV CODE 250 W/ 637 OVERRIDE(OP): Performed by: HOSPITALIST

## 2017-04-18 PROCEDURE — 700111 HCHG RX REV CODE 636 W/ 250 OVERRIDE (IP): Performed by: STUDENT IN AN ORGANIZED HEALTH CARE EDUCATION/TRAINING PROGRAM

## 2017-04-18 PROCEDURE — 84100 ASSAY OF PHOSPHORUS: CPT

## 2017-04-18 PROCEDURE — 85027 COMPLETE CBC AUTOMATED: CPT

## 2017-04-18 PROCEDURE — 85007 BL SMEAR W/DIFF WBC COUNT: CPT

## 2017-04-18 PROCEDURE — 83735 ASSAY OF MAGNESIUM: CPT

## 2017-04-18 PROCEDURE — 700101 HCHG RX REV CODE 250: Performed by: STUDENT IN AN ORGANIZED HEALTH CARE EDUCATION/TRAINING PROGRAM

## 2017-04-18 PROCEDURE — 80048 BASIC METABOLIC PNL TOTAL CA: CPT

## 2017-04-18 PROCEDURE — 99233 SBSQ HOSP IP/OBS HIGH 50: CPT | Mod: GC | Performed by: INTERNAL MEDICINE

## 2017-04-18 PROCEDURE — A9270 NON-COVERED ITEM OR SERVICE: HCPCS | Performed by: INTERNAL MEDICINE

## 2017-04-18 PROCEDURE — A9270 NON-COVERED ITEM OR SERVICE: HCPCS | Performed by: HOSPITALIST

## 2017-04-18 PROCEDURE — 700102 HCHG RX REV CODE 250 W/ 637 OVERRIDE(OP): Performed by: STUDENT IN AN ORGANIZED HEALTH CARE EDUCATION/TRAINING PROGRAM

## 2017-04-18 PROCEDURE — 700111 HCHG RX REV CODE 636 W/ 250 OVERRIDE (IP): Performed by: INTERNAL MEDICINE

## 2017-04-18 RX ORDER — POTASSIUM CHLORIDE 1.5 G/1.58G
40 POWDER, FOR SOLUTION ORAL ONCE
Status: COMPLETED | OUTPATIENT
Start: 2017-04-18 | End: 2017-04-18

## 2017-04-18 RX ORDER — MAGNESIUM SULFATE HEPTAHYDRATE 40 MG/ML
2 INJECTION, SOLUTION INTRAVENOUS ONCE
Status: COMPLETED | OUTPATIENT
Start: 2017-04-18 | End: 2017-04-18

## 2017-04-18 RX ORDER — NICOTINE 21 MG/24HR
21 PATCH, TRANSDERMAL 24 HOURS TRANSDERMAL
Status: DISCONTINUED | OUTPATIENT
Start: 2017-04-18 | End: 2017-04-21 | Stop reason: HOSPADM

## 2017-04-18 RX ORDER — DIAZEPAM 5 MG/1
5 TABLET ORAL EVERY 8 HOURS PRN
Status: DISCONTINUED | OUTPATIENT
Start: 2017-04-18 | End: 2017-04-19

## 2017-04-18 RX ADMIN — SODIUM PHOSPHATE, MONOBASIC, MONOHYDRATE AND SODIUM PHOSPHATE, DIBASIC, ANHYDROUS 30 MMOL: 276; 142 INJECTION, SOLUTION INTRAVENOUS at 09:26

## 2017-04-18 RX ADMIN — SODIUM CHLORIDE: 9 INJECTION, SOLUTION INTRAVENOUS at 05:39

## 2017-04-18 RX ADMIN — SODIUM CHLORIDE: 9 INJECTION, SOLUTION INTRAVENOUS at 05:37

## 2017-04-18 RX ADMIN — ACETAMINOPHEN 650 MG: 325 TABLET, FILM COATED ORAL at 04:12

## 2017-04-18 RX ADMIN — NICOTINE 21 MG: 21 PATCH TRANSDERMAL at 19:53

## 2017-04-18 RX ADMIN — DIAZEPAM 5 MG: 5 TABLET ORAL at 19:53

## 2017-04-18 RX ADMIN — POTASSIUM CHLORIDE 40 MEQ: 1.5 POWDER, FOR SOLUTION ORAL at 09:03

## 2017-04-18 RX ADMIN — NICOTINE 14 MG: 14 PATCH TRANSDERMAL at 05:23

## 2017-04-18 RX ADMIN — ENOXAPARIN SODIUM 40 MG: 100 INJECTION SUBCUTANEOUS at 09:03

## 2017-04-18 RX ADMIN — MAGNESIUM SULFATE IN WATER 2 G: 40 INJECTION, SOLUTION INTRAVENOUS at 09:02

## 2017-04-18 ASSESSMENT — ENCOUNTER SYMPTOMS
HEARTBURN: 0
PALPITATIONS: 0
FEVER: 0
COUGH: 1
SORE THROAT: 1
NAUSEA: 0
HEMOPTYSIS: 0
BLURRED VISION: 0
DOUBLE VISION: 0
HEADACHES: 0
CHILLS: 0

## 2017-04-18 ASSESSMENT — PAIN SCALES - GENERAL
PAINLEVEL_OUTOF10: 8
PAINLEVEL_OUTOF10: 0
PAINLEVEL_OUTOF10: 0
PAINLEVEL_OUTOF10: 2
PAINLEVEL_OUTOF10: 0
PAINLEVEL_OUTOF10: 4
PAINLEVEL_OUTOF10: 0
PAINLEVEL_OUTOF10: 0

## 2017-04-18 ASSESSMENT — LIFESTYLE VARIABLES: SUBSTANCE_ABUSE: 1

## 2017-04-18 NOTE — CARE PLAN
Problem: Knowledge Deficit  Goal: Knowledge of disease process/condition, treatment plan, diagnostic tests, and medications will improve  Outcome: PROGRESSING AS EXPECTED  Treatment plan, disease process and current condition, tests, and medications discussed. Patient verbalizes understanding. Education documented. Unit routine and plan of care discussed. No further questions.    Problem: Fluid Volume:  Goal: Will maintain balanced intake and output  Outcome: PROGRESSING AS EXPECTED  Intake and output assessed and monitored throughout shift. Fluids encouraged.

## 2017-04-18 NOTE — PROGRESS NOTES
Patient's mother took the patient's belongings for safe keeping. Patient's partial dentures are at bedside.

## 2017-04-18 NOTE — PROGRESS NOTES
[facility] GOLD PROGRESS NOTE               Author: Miller Merritt Date & Time created: 4/18/2017  6:49 AM           Diagnosis:  Altered mental status, likely drug overdose Mariam Antonio with acute toxic/metabolic encephalopathy  Acute hypoxic respiratory failure due to AMS  Sinus tachycardia/Hypotension/Lactic acidosis --Resolved  Amphetamine abuse /IVDA  History of suicidal attempts/History of substance abuse/PTSD/schizoaffective disorder/self mutilation  Hypokalemia /hypomagnesemia/hypophasphatemia  Coagulopathy--F/u  Anemia--menstruation and hemodilution    ID:  26-year-old young lady with above-mentioned past medical history, difficult social situation, was institutionalized for her psych problems for long time, currently on Seroquel and lithium which she stopped taking 2 weeks prior to admission, was admitted after a witnessed ground-level fall outside her apartment with a bag of lithium and Seroquel by her side. .      Interval History:  Extubated yesterday, doing well, Cepacol for throat discomfort, tolerating oral, on room air, normal sinus rhythm, tachycardia resolved  Discontinue intravenous fluids/Replete K, mag and phosphorus/Discontinue central line  Legal hold, one-on-one sitter, follow psych recommendations for psych related medications  Follow-up on referral for mental health facility  Advance diet as tolerated  PT/Seizure/aspiration/Fall precautions  Repeat EKG for QTC  Transfer to medical floor    Review of Systems:  Review of Systems   Constitutional: Negative for fever and chills.   HENT: Positive for sore throat. Negative for hearing loss.    Eyes: Negative for blurred vision and double vision.   Respiratory: Positive for cough. Negative for hemoptysis.    Cardiovascular: Negative for chest pain and palpitations.   Gastrointestinal: Negative for heartburn and nausea.   Genitourinary: Negative for dysuria and urgency.   Neurological: Negative for headaches.   Psychiatric/Behavioral: Positive for  suicidal ideas and substance abuse.       Physical Exam:  Physical Exam   Constitutional: She appears well-developed.   Eyes: Pupils are equal, round, and reactive to light.   Neck: No tracheal deviation present. No thyromegaly present.   Cardiovascular: Normal rate.  Exam reveals no gallop and no friction rub.    No murmur heard.  Pulmonary/Chest: No respiratory distress. She has no wheezes. She has no rales.   Abdominal: She exhibits no distension. There is no tenderness.   Genitourinary: Guaiac negative stool.   Musculoskeletal: She exhibits no edema.   Neurological: No cranial nerve deficit.   Intubated/sedated   Skin: No rash noted. She is not diaphoretic. No erythema.       Labs:  Recent Labs      04/15/17   1955   04/16/17   0448  04/16/17   1034  04/17/17   0454   PTUKX16I  7.40   --    --    --    --    [id number]  30.1   --    --    --    --    JAAVT960C  333.1*   --    --    --    --    CJTR1USG  99.0   --    --    --    --    ARTHCO3  18   --    --    --    --    ARTBE  -5*   --    --    --    --    ISTATAPH   --    < >  7.288*  7.390*  7.331*   ISTATAPCO2   --    < >  31.9  25.2*  34.5   ISTATAPO2   --    < >  132*  131*  51*   ISTATATCO2   --    < >  16*  16*  19*   [id number]   --    < >  99  99  83*   ISTATARTHCO3   --    < >  15.3*  15.2*  18.3   ISTATARTBE   --    < >  -10*  -9*  -7*   ISTATTEMP   --    < >  96.7 F  97.7 F  97.3 F   ISTATFIO2   --    < >  .30  30  30   ISTATSPEC   --    < >  Arterial  Arterial  Arterial   ISTATAPHTC   --    < >  7.303*  7.398*  7.341*   JUCIXVEX0PC   --    < >  125*  128*  48*    < > = values in this interval not displayed.         Recent Labs      04/16/17   0500  04/17/17   0600  04/18/17   0415   SODIUM  139  140  142   POTASSIUM  3.7  3.8  3.6   CHLORIDE  117*  114*  111   CO2  17*  19*  24   BUN  10  6*  3*   CREATININE  0.50  0.70  0.60   MAGNESIUM  1.7  1.9  1.9   PHOSPHORUS  2.7  3.2  2.4*   CALCIUM  7.6*  8.6  8.4*     Recent Labs      04/15/17   1955   17   0500  17   0617   0415   ALTSGPT  11  7  9   --    ASTSGOT  12  9*  13   --    ALKPHOSPHAT  78  56  66   --    TBILIRUBIN  0.9  0.8  0.6   --    PREALBUMIN   --    --   20.0   --    GLUCOSE  215*  100*  94  103*     Recent Labs      04/15/17   1953  04/16/17   0500  04/16/17   1318  175   RBC  4.26  3.17*   --   3.55*  3.66*   HEMOGLOBIN  12.9  9.6*  10.3*  10.7*  10.8*   HEMATOCRIT  38.1  29.2*  31.0*  32.4*  33.3*   PLATELETCT  226  151*   --   189  225   PROTHROMBTM  15.5*   --    --   16.5*   --    APTT  24.7   --    --    --    --    INR  1.19*   --    --   1.29*   --      Recent Labs      04/15/17   1953  04/16/17   0500  04/17/17   0600  04/18/17   0415   WBC  5.0  4.5*  9.8  9.7   NEUTSPOLYS   --   51.50  74.50*   --    LYMPHOCYTES   --   32.30  12.90*   --    MONOCYTES   --   14.30*  11.00   --    EOSINOPHILS   --   1.10  0.80   --    BASOPHILS   --   0.40  0.50   --    ASTSGOT  12  9*  13   --    ALTSGPT  11  7  9   --    ALKPHOSPHAT  78  56  66   --    TBILIRUBIN  0.9  0.8  0.6   --            Hemodynamics:  Temp (24hrs), Av.3 °C (99.1 °F), Min:36.5 °C (97.7 °F), Max:37.8 °C (100 °F)  Temperature: 37.6 °C (99.6 °F)  Pulse  Av.2  Min: 65  Max: 143Heart Rate (Monitored): (!) 104  NIBP: 107/60 mmHg     Did not require pressors,  Respiratory:    Respiration: 16, Pulse Oximetry: 95 %, O2 Daily Delivery Respiratory : Silicone Nasal Cannula        Fluids:    Intake/Output Summary (Last 24 hours) at 17 0649  Last data filed at 17 0600   Gross per 24 hour   Intake 6151.95 ml   Output   8973 ml   Net -2821.05 ml     Weight: 64 kg (141 lb 1.5 oz)      GI/Nutrition:  Orders Placed This Encounter   Procedures   • DIET ORDER     Standing Status: Standing      Number of Occurrences: 1      Standing Expiration Date:      Order Specific Question:  Diet:     Answer:  Regular [1]     Order Specific Question:  Miscellaneous modifications:      Answer:  [employer]  [2]      Comments:  No sharp objects / legal hold       Medical Decision Making, by Problem:  Active Hospital Problems    Diagnosis   • Respiratory failure (CMS-HCC) [J96.90]   • Altered mental status [R41.82]       Altered mental status, likely drug overdose Marisela Sersheila with acute toxic/metabolic encephalopathy  Acute hypoxic respiratory failure due to AMS  Intubate 04/16--liberated 04/17   doing well, Cepacol for throat discomfort, tolerating oral, on room air, normal sinus rhythm, tachycardia resolved  Discontinue intravenous fluids/Discontinue central line  Advance diet as tolerated  PT/Seizure/aspiration/Fall precautions  Transfer to medical floor      Amphetamine abuse /IVDA  History of suicidal attempts/History of substance abuse/PTSD/schizoaffective disorder/self mutilation  Consulted Psych  Difficult social situation  was institutionalized for her psych problems for long time, currently on Seroquel and lithium which she stopped taking 2 weeks prior to admission  Legal hold, one-on-one sitter,   follow psych recommendations for psych related medications  Follow-up on referral for mental health facility    Hypokalemia /hypomagnesemia/hypophasphatemia  Replete as needed    Coagulopathy  --F/u  Last PT/INR 16.5/1.29--improving    Anemia--  menstruation and hemodilution  stable    Sinus tachycardia/Hypotension/Lactic acidosis --  Resolved            EKG reviewed, Radiology images reviewed, Labs reviewed and Medications reviewed  Mckenzie catheter: Critically Ill - Requiring Accurate Measurement of Urinary Output      DVT Prophylaxis: Enoxaparin (Lovenox)  DVT prophylaxis - mechanical: SCDs  Ulcer prophylaxis: Yes

## 2017-04-18 NOTE — PROGRESS NOTES
Pulmonary Critical Care Progress Note    Interval Events:  24 hour interval history reviewed  Reason for visit:  Respiratory failure, acute metabolic encephalopathy  Seen with [facility]      SR-ST  Stop IV fluid  RA  Eating  Legal hold extended  OK to floor      PFSH:  No change.    Respiratory:     Pulse Oximetry: 95 %  CXR clear  Lungs clear  RA  No SOB or cough     Recent Labs      04/15/17   1955   04/16/17   0448  04/16/17   1034  04/17/17   0454   WTHJY14D  7.40   --    --    --    --    [id number]  30.1   --    --    --    --    [id number]  333.1*   --    --    --    --    [id number]  99.0   --    --    --    --    ARTHCO3  18   --    --    --    --    ARTBE  -5*   --    --    --    --    ISTATAPH   --    < >  7.288*  7.390*  7.331*   ISTATAPCO2   --    < >  31.9  25.2*  34.5   ISTATAPO2   --    < >  132*  131*  51*   ISTATATCO2   --    < >  16*  16*  19*   [id number]   --    < >  99  99  83*   ISTATARTHCO3   --    < >  15.3*  15.2*  18.3   ISTATARTBE   --    < >  -10*  -9*  -7*   ISTATTEMP   --    < >  96.7 F  97.7 F  97.3 F   ISTATFIO2   --    < >  .30  30  30   ISTATSPEC   --    < >  Arterial  Arterial  Arterial   ISTATAPHTC   --    < >  7.303*  7.398*  7.341*   [id number]   --    < >  125*  128*  48*    < > = values in this interval not displayed.       HemoDynamics:  Pulse: (!) 104, Heart Rate (Monitored): (!) 104  NIBP: 107/60 mmHg     SR  No angina, palp, syncope    Neuro:  Awake and alert  No focal weakness  No HA, Sz    Fluids:  Intake/Output       04/16/17 0700 - 04/17/17 0659 04/17/17 0700 - 04/18/17 0659 04/18/17 0700 - 04/19/17 0659      9522-7862 1253-1490 Total [id number] 1900-0659 Total [id number] 1900-0659 Total       Intake    P.O.  --  -- --  1500  1000 2500  --  -- --    P.O. -- -- -- 1500 1000 2500 -- -- --    I.V.  2340.8  1944.6 4285.4  1852  1800 3652  --  -- --    Precedex Volume 40.8 144.6 185.4 52 -- 52 -- -- --    IV Volume (NS) 2100 1800 3900 1800 1800 3600 -- -- --    IV  Piggyback Volume (IV Piggyback) 200 -- 200 -- -- -- -- -- --    Total Intake 2340.8 1944.6 4285.4 3352 2800 6152 -- -- --       Output    Urine  3489  1325 4814  5022  4223 6514  --  -- --    Number of Times Voided -- -- -- -- 0 x 0 x -- -- --    Indwelling Cathether 3489 1325 4814 4322 8611 3893 -- -- --    Drains  350  150 500  --  -- --  --  -- --    Oral Gastric Tube 350 150 500 -- -- -- -- -- --    Stool  --  -- --  --  -- --  --  -- --    Number of Times Stooled 0 x 0 x 0 x 0 x 0 x 0 x -- -- --    Total Output 3839 1475 5314 8105 2023 4562 -- -- --       Net I/O     -1498.2 469.6 -1028.6 -1396.1 -1425 -2821.1 -- -- --        Weight: 64 kg (141 lb 1.5 oz)  Recent Labs      17   0617   0415   SODIUM  139  140  142   POTASSIUM  3.7  3.8  3.6   CHLORIDE  117*  114*  111   CO2  17*  19*  24   BUN  10  6*  3*   CREATININE  0.50  0.70  0.60   MAGNESIUM  1.7  1.9  1.9   PHOSPHORUS  2.7  3.2  2.4*   CALCIUM  7.6*  8.6  8.4*       GI/Nutrition:  Abd soft ND/NT  No N/V/P    Liver Function  Recent Labs      04/15/17   1955  04/16/17   0500  17   0600  17   0415   ALTSGPT  11  7  9   --    ASTSGOT  12  9*  13   --    ALKPHOSPHAT  78  56  66   --    TBILIRUBIN  0.9  0.8  0.6   --    PREALBUMIN   --    --   20.0   --    GLUCOSE  215*  100*  94  103*       Heme:  Recent Labs      04/15/17   1955  04/16/17   0500  17   1318  17   0600  04/18/17   0415   RBC  4.26  3.17*   --   3.55*  3.66*   HEMOGLOBIN  12.9  9.6*  10.3*  10.7*  10.8*   HEMATOCRIT  38.1  29.2*  31.0*  32.4*  33.3*   PLATELETCT  226  151*   --   189  225   PROTHROMBTM  15.5*   --    --   16.5*   --    APTT  24.7   --    --    --    --    INR  1.19*   --    --   1.29*   --        Infectious Disease:  Temp  Av.3 °C (99.1 °F)  Min: 36.5 °C (97.7 °F)  Max: 37.8 °C (100 °F)    Recent Labs      04/15/17   1955  17   0500  17   0600  17   WBC  5.0  4.5*  9.8  9.7   NEUTSPOLYS   --    51.50  74.50*   --    LYMPHOCYTES   --   32.30  12.90*   --    MONOCYTES   --   14.30*  11.00   --    EOSINOPHILS   --   1.10  0.80   --    BASOPHILS   --   0.40  0.50   --    ASTSGOT  12  9*  13   --    ALTSGPT  11  7  9   --    ALKPHOSPHAT  78  56  66   --    TBILIRUBIN  0.9  0.8  0.6   --      Current Facility-Administered Medications   Medication Dose Frequency Provider Last Rate Last Dose   • enoxaparin (LOVENOX) inj 40 mg  40 mg DAILY Vicente Roldan M.D.   40 mg at 04/17/17 1308   • benzocaine-menthol (CEPACOL) lozenge 1 Lozenge  1 Lozenge Q2HRS PRN Vicente Roldan M.D.   1 Lozenge at 04/17/17 2109   • NS infusion   Continuous Miller Merritt M.D. 150 mL/hr at 04/18/17 0539     • senna-docusate (PERICOLACE or SENOKOT S) 8.6-50 MG per tablet 2 Tab  2 Tab BID Chris Hernandez M.D.   2 Tab at 04/17/17 2109    And   • polyethylene glycol/lytes (MIRALAX) PACKET 1 Packet  1 Packet QDAY PRN Chris Hernandez M.D.        And   • magnesium hydroxide (MILK OF MAGNESIA) suspension 30 mL  30 mL QDAY PRN Chris Hernandez M.D.       • ondansetron (ZOFRAN) syringe/vial injection 4 mg  4 mg Q4HRS PRN Chris Hernandez M.D.       • prochlorperazine (COMPAZINE) injection 5-10 mg  5-10 mg Q4HRS PRN Chris Hernandez M.D.       • acetaminophen (TYLENOL) tablet 650 mg  650 mg Q6HRS PRN Chris Hernandez M.D.   650 mg at 04/18/17 0412   • nicotine (NICODERM) 14 MG/24HR 14 mg  14 mg Daily-0600 Chris Hernandez M.D.   14 mg at 04/18/17 0523    And   • nicotine polacrilex (NICORETTE) 2 MG piece 2 mg  2 mg Q HOUR PRN Chris Hernandez M.D.       • Respiratory Care per Protocol   Continuous RT Joao Jackson M.D.       • lactulose 20 GM/30ML solution 30 mL  30 mL Q24HRS PRN Joao Jackson M.D.       • bisacodyl (DULCOLAX) suppository 10 mg  10 mg Q24HRS PRN Joao Jackson M.D.       • fleet enema 133 mL  1 Each Once PRN Joao Jackson M.D.       • MD ALERT...Adult ICU Electrolyte Replacement per Pharmacy Protocol   pharmacy to  dose Joao Jackson M.D.       • ipratropium-albuterol (DUONEB) nebulizer solution 3 mL  3 mL Q2HRS PRN (RT) Joao Jackson M.D.         Last reviewed on 4/16/2017  2:37 PM by Sara Rodriguez, Pharmacy Int    Quality  Measures:  Labs reviewed, Medications reviewed and Radiology images reviewed  Mckenzie catheter: Critically Ill - Requiring Accurate Measurement of Urinary Output  Central line in place: Need for access    DVT Prophylaxis: Enoxaparin (Lovenox)  DVT prophylaxis - mechanical: SCDs  Ulcer prophylaxis: Yes            Assessment and Plan:    S/P VDRF - intubated 4/15, liberated 4/17  Acute metabolic encephalopathy   - head CT negative   - presumed drug overdose (possible lithium, Seroquel)    - legal hold   - improved   - ammonia normal  Positive urine drug screen for amphetamines  Psychiatric disorders - extensive history   - psychiatry on board    OK to transfer to medical bed.  [facility] will sign off on transfer.    Discussed with RN, RT, Team

## 2017-04-18 NOTE — PROGRESS NOTES
12 hour chart check. Assumed care of patient at 0700. Received bedside report from REHANA Dawn. Patient comfortable in bed, call light within reach, no questions, needs or concerns at this time. Bed Alarm set. Appropriate bed rail up. Patient close to nursing station. No family at bedside. . Ricabosilas at bedside awaiting new orders. Will continue to closely monitor.

## 2017-04-18 NOTE — DISCHARGE PLANNING
Per the request of KAREEM Tello, referral has been sent to  [facility] and Medical clearance information has been faxed at [phone].

## 2017-04-18 NOTE — CARE PLAN
Problem: Safety  Goal: Will remain free from falls  Outcome: PROGRESSING AS EXPECTED  Patient will remain free from from falls for the entirety of shift. Interventions: treaded slipper socks on, bed alarms on, call light within reach, patient consistent with call light, alternative bed rails up, q1 hour rounding and gait assessment.     Problem: Psychosocial Needs:  Goal: Level of anxiety will decrease  Outcome: PROGRESSING AS EXPECTED  Continue to keep patient calm for the entirety of shift. Continue to support patient's needs. Encourage rest periods and physical activity balance. Allow patient express needs. Continue to set limits and allow patient to contact family (okay per Dr. Murphy ) for emotion and support.

## 2017-04-19 LAB
ANION GAP SERPL CALC-SCNC: 7 MMOL/L (ref 0–11.9)
BUN SERPL-MCNC: 6 MG/DL (ref 8–22)
CALCIUM SERPL-MCNC: 9.4 MG/DL (ref 8.5–10.5)
CHLORIDE SERPL-SCNC: 104 MMOL/L (ref 96–112)
CO2 SERPL-SCNC: 25 MMOL/L (ref 20–33)
CREAT SERPL-MCNC: 0.57 MG/DL (ref 0.5–1.4)
EKG IMPRESSION: NORMAL
GFR SERPL CREATININE-BSD FRML MDRD: >60 ML/MIN/1.73 M 2
GLUCOSE SERPL-MCNC: 112 MG/DL (ref 65–99)
MAGNESIUM SERPL-MCNC: 1.8 MG/DL (ref 1.5–2.5)
PHOSPHATE SERPL-MCNC: 3.6 MG/DL (ref 2.5–4.5)
POTASSIUM SERPL-SCNC: 3.5 MMOL/L (ref 3.6–5.5)
SODIUM SERPL-SCNC: 136 MMOL/L (ref 135–145)

## 2017-04-19 PROCEDURE — 99233 SBSQ HOSP IP/OBS HIGH 50: CPT | Mod: GC | Performed by: INTERNAL MEDICINE

## 2017-04-19 PROCEDURE — 93010 ELECTROCARDIOGRAM REPORT: CPT | Performed by: INTERNAL MEDICINE

## 2017-04-19 PROCEDURE — A9270 NON-COVERED ITEM OR SERVICE: HCPCS | Performed by: PSYCHIATRY & NEUROLOGY

## 2017-04-19 PROCEDURE — 84100 ASSAY OF PHOSPHORUS: CPT

## 2017-04-19 PROCEDURE — 700102 HCHG RX REV CODE 250 W/ 637 OVERRIDE(OP): Performed by: STUDENT IN AN ORGANIZED HEALTH CARE EDUCATION/TRAINING PROGRAM

## 2017-04-19 PROCEDURE — 700102 HCHG RX REV CODE 250 W/ 637 OVERRIDE(OP): Performed by: HOSPITALIST

## 2017-04-19 PROCEDURE — 700111 HCHG RX REV CODE 636 W/ 250 OVERRIDE (IP): Performed by: INTERNAL MEDICINE

## 2017-04-19 PROCEDURE — 80048 BASIC METABOLIC PNL TOTAL CA: CPT

## 2017-04-19 PROCEDURE — A9270 NON-COVERED ITEM OR SERVICE: HCPCS | Performed by: HOSPITALIST

## 2017-04-19 PROCEDURE — A9270 NON-COVERED ITEM OR SERVICE: HCPCS | Performed by: STUDENT IN AN ORGANIZED HEALTH CARE EDUCATION/TRAINING PROGRAM

## 2017-04-19 PROCEDURE — 770006 HCHG ROOM/CARE - MED/SURG/GYN SEMI*

## 2017-04-19 PROCEDURE — 93005 ELECTROCARDIOGRAM TRACING: CPT | Performed by: STUDENT IN AN ORGANIZED HEALTH CARE EDUCATION/TRAINING PROGRAM

## 2017-04-19 PROCEDURE — 83735 ASSAY OF MAGNESIUM: CPT

## 2017-04-19 PROCEDURE — 700102 HCHG RX REV CODE 250 W/ 637 OVERRIDE(OP): Performed by: PSYCHIATRY & NEUROLOGY

## 2017-04-19 PROCEDURE — 700111 HCHG RX REV CODE 636 W/ 250 OVERRIDE (IP): Performed by: STUDENT IN AN ORGANIZED HEALTH CARE EDUCATION/TRAINING PROGRAM

## 2017-04-19 RX ORDER — QUETIAPINE FUMARATE 100 MG/1
200 TABLET, FILM COATED ORAL EVERY EVENING
Status: DISCONTINUED | OUTPATIENT
Start: 2017-04-19 | End: 2017-04-21 | Stop reason: HOSPADM

## 2017-04-19 RX ORDER — DIAZEPAM 5 MG/1
10 TABLET ORAL EVERY 8 HOURS PRN
Status: DISCONTINUED | OUTPATIENT
Start: 2017-04-19 | End: 2017-04-21 | Stop reason: HOSPADM

## 2017-04-19 RX ORDER — LITHIUM CARBONATE 450 MG
900 TABLET, EXTENDED RELEASE ORAL NIGHTLY
Status: DISCONTINUED | OUTPATIENT
Start: 2017-04-19 | End: 2017-04-21 | Stop reason: HOSPADM

## 2017-04-19 RX ORDER — POTASSIUM CHLORIDE 20 MEQ/1
60 TABLET, EXTENDED RELEASE ORAL ONCE
Status: DISCONTINUED | OUTPATIENT
Start: 2017-04-19 | End: 2017-04-19

## 2017-04-19 RX ORDER — MAGNESIUM SULFATE HEPTAHYDRATE 40 MG/ML
2 INJECTION, SOLUTION INTRAVENOUS ONCE
Status: COMPLETED | OUTPATIENT
Start: 2017-04-19 | End: 2017-04-19

## 2017-04-19 RX ORDER — POTASSIUM CHLORIDE 1.5 G/1.58G
60 POWDER, FOR SOLUTION ORAL ONCE
Status: COMPLETED | OUTPATIENT
Start: 2017-04-19 | End: 2017-04-19

## 2017-04-19 RX ORDER — DIAZEPAM 5 MG/1
5 TABLET ORAL ONCE
Status: COMPLETED | OUTPATIENT
Start: 2017-04-19 | End: 2017-04-19

## 2017-04-19 RX ADMIN — LITHIUM CARBONATE 900 MG: 450 TABLET, EXTENDED RELEASE ORAL at 21:21

## 2017-04-19 RX ADMIN — DIAZEPAM 10 MG: 5 TABLET ORAL at 13:54

## 2017-04-19 RX ADMIN — POTASSIUM CHLORIDE 60 MEQ: 1.5 POWDER, FOR SOLUTION ORAL at 09:08

## 2017-04-19 RX ADMIN — DIAZEPAM 5 MG: 5 TABLET ORAL at 08:16

## 2017-04-19 RX ADMIN — QUETIAPINE FUMARATE 200 MG: 100 TABLET, FILM COATED ORAL at 21:21

## 2017-04-19 RX ADMIN — DIAZEPAM 10 MG: 5 TABLET ORAL at 21:32

## 2017-04-19 RX ADMIN — ENOXAPARIN SODIUM 40 MG: 100 INJECTION SUBCUTANEOUS at 08:17

## 2017-04-19 RX ADMIN — MAGNESIUM SULFATE IN WATER 2 G: 40 INJECTION, SOLUTION INTRAVENOUS at 08:18

## 2017-04-19 ASSESSMENT — PAIN SCALES - GENERAL
PAINLEVEL_OUTOF10: 2

## 2017-04-19 ASSESSMENT — LIFESTYLE VARIABLES: SUBSTANCE_ABUSE: 1

## 2017-04-19 ASSESSMENT — ENCOUNTER SYMPTOMS
COUGH: 1
DOUBLE VISION: 0
NAUSEA: 0
HEMOPTYSIS: 0
BLURRED VISION: 0
HEARTBURN: 0
FEVER: 0
HEADACHES: 0
PALPITATIONS: 0
SORE THROAT: 1
CHILLS: 0

## 2017-04-19 NOTE — PSYCHIATRY
"PSYCHIATRIC CONSULTATION:  Reason for admission:     Reason for consult: suicide attempt   Requesting Physician:  Supervising Physician:       NOTE FOR 4/17    Legal status:    On hold     Chief Complaint:  Altered mental status     HPI:   27 y/o woman who presented to the emergency room after an overdose. She was seen to collapse outside of her apartment complex. Believed overdose on seroquel. UDS +for amphetamines. She has a long history of psychiatric symptoms, including schizoaffective disorder. Previous suicide attempts. She presented with AMS and required sedation and restraint. Collateral was obtained from family in ED by ED staff. She is extubated but somnolent and confused at time of eval.     Psychiatric Review of Systems:current symptoms as reported by pt.  Depression:      +  Gaby:no evidence gaby   Anxiety/Panic Attacks   PTSD symptom:   Psychosis:  Other:       Medical Review of Systems: as reported by pt. All systems reviewed. Only those found to be + are noted below. All others are negative.   Metabolic encephalopathy      Psychiatric Examination: observed phenomenon:  Musculoskeletal(abnormal movements, gait, etc):+psychomotor retardation   Appearance:wnl  Thoughts:slowed, confused   Speech:  Mood:    depressed  Affect:    blunted  SI/HI:     Attention/Alertness:   Not alert   Memory:    impaired  Orientation:    Not oriented  Fund of Knowledge:    impaired  Insight/Judgement into psychiatric symptoms: decreased  Neurological Testing:( ie clock, cube drawing, MMSE, MOCA,etc.)    Other system(s) examined: (neurological, skin, GI, etc)          Past Psychiatric Hx:   Previous history of schizoaffective disorder with institutionalization \"from age [age]\".   Previous suicide attempts. Hx of self mutilation per chart review.   Possible hx of reactive attachment disorder and PTSD  Family Psychiatric Hx:  Unknown   Social Hx:  Has family support in town  Lives in an apartment   Social History     Social " History   • Marital Status: N/A     Spouse Name: N/A   • Number of Children: N/A   • Years of Education: N/A     Occupational History   • Not on file.     Social History Main Topics   • Smoking status: Not on file   • Smokeless tobacco: Not on file   • Alcohol Use: Not on file   • Drug Use: Not on file   • Sexual Activity: Not on file     Other Topics Concern   • Not on file     Social History Narrative   • No narrative on file       Drug/Alcohol/Tobacco Hx:   Drugs:+amphetamine , hx of polysubstance   Alcohol:   Tobacco:    Medical Hx: labs, MARS, medications, etc were reviewed. Only those findings of potential interest to psychiatry are noted below:  Medical Conditions:   No past medical history on file.    Allergies: Ativan and Sulfa drugs    Medications (currently prescribed at [facility]):  No current facility-administered medications on file prior to encounter.     No current outpatient prescriptions on file prior to encounter.           Cranial Imaging: reviewed  Other:      ASSESSMENT: (new dx, acuity level)    Schizoaffective Disorder, by hx  Amphetamine Use disorder  PTSD, by hx    PLAN:    Hold extended.   Please transfer to psych when medically clear  Will follow   Thank you for the consult.

## 2017-04-19 NOTE — PROGRESS NOTES
[facility] ICU GOLD PROGRESS NOTE               Author: Miller Merritt Date & Time created: 4/19/2017  6:46 AM           Diagnosis:  Altered mental status, likely drug overdose Li Seroayleen with acute toxic/metabolic encephalopathy  Acute hypoxic respiratory failure due to AMS  Sinus tachycardia/Hypotension/Lactic acidosis --Resolved  Amphetamine abuse /IVDA  History of suicidal attempts/History of substance abuse/PTSD/schizoaffective disorder/self mutilation  Hypokalemia /hypomagnesemia/hypophasphatemia  Coagulopathy--F/u  Anemia--menstruation and hemodilution    ID:  26-year-old young lady with above-mentioned past medical history, difficult social situation, was institutionalized for her psych problems for long time, currently on Seroquel and lithium which she stopped taking 2 weeks prior to admission, was admitted after a witnessed ground-level fall outside her apartment with a bag of lithium and Seroquel by her side. .      Interval History:  Extubated 04/17, doing well, Cepacol for throat discomfort, tolerating oral, on room air, normal sinus rhythm, tachycardia resolved  Legal hold, one-on-one sitter,--evaluated by Dist. Atty.and will be reevaluated by Court Drs. this afternoon--- follow-up on psych recommendations, follow-up and reevaluation  Follow-up on referral for mental health facility-admitted taking full bottle of Seroquel 400 mg pills, did not take lithium-psych needs to see patient for medication guidance-- follow psych recommendations for psych related medications  After upset after meeting district Atty.--- increase Valium to 10 mg 3 times a day--  PT/Seizure/aspiration/Fall precautions  Repeat EKG for QTC is 419  Transfer to medical floor when bed available and legal hold, one-on-one sitter  replete K and mag    Review of Systems:  Review of Systems   Constitutional: Negative for fever and chills.   HENT: Positive for sore throat. Negative for hearing loss.    Eyes: Negative for blurred vision and  double vision.   Respiratory: Positive for cough. Negative for hemoptysis.    Cardiovascular: Negative for chest pain and palpitations.   Gastrointestinal: Negative for heartburn and nausea.   Genitourinary: Negative for dysuria and urgency.   Neurological: Negative for headaches.   Psychiatric/Behavioral: Positive for suicidal ideas and substance abuse.       Physical Exam:  Physical Exam   Constitutional: She appears well-developed.   Eyes: Pupils are equal, round, and reactive to light.   Neck: No tracheal deviation present. No thyromegaly present.   Cardiovascular: Normal rate.  Exam reveals no gallop and no friction rub.    No murmur heard.  Pulmonary/Chest: No respiratory distress. She has no wheezes. She has no rales.   Abdominal: She exhibits no distension. There is no tenderness.   Genitourinary: Guaiac negative stool.   Musculoskeletal: She exhibits no edema.   Neurological: No cranial nerve deficit.   , Anxious, crying    Skin: No rash noted. She is not diaphoretic. No erythema.       Labs:  Recent Labs      04/16/17   1034  04/17/17   0454   ISTATAPH  7.390*  7.331*   ISTATAPCO2  25.2*  34.5   ISTATAPO2  131*  51*   ISTATATCO2  16*  19*   [id number]  99  83*   ISTATARTHCO3  15.2*  18.3   ISTATARTBE  -9*  -7*   ISTATTEMP  97.7 F  97.3 F   ISTATFIO2  30  30   ISTATSPEC  Arterial  Arterial   ISTATAPHTC  7.398*  7.341*   [id number]  128*  48*         Recent Labs      04/17/17   0600  04/18/17   0415  04/19/17   0440   SODIUM  140  142  136   POTASSIUM  3.8  3.6  3.5*   CHLORIDE  114*  111  104   CO2  19*  24  25   BUN  6*  3*  6*   CREATININE  0.70  0.60  0.57   MAGNESIUM  1.9  1.9  1.8   PHOSPHORUS  3.2  2.4*  3.6   CALCIUM  8.6  8.4*  9.4     Recent Labs      04/17/17   0600  04/18/17   0415  04/19/17   0440   ALTSGPT  9   --    --    ASTSGOT  13   --    --    ALKPHOSPHAT  66   --    --    TBILIRUBIN  0.6   --    --    PREALBUMIN  20.0   --    --    GLUCOSE  94  103*  112*     Recent Labs      04/16/17    1318  17   0600  17   0415   RBC   --   3.55*  3.66*   HEMOGLOBIN  10.3*  10.7*  10.8*   HEMATOCRIT  31.0*  32.4*  33.3*   PLATELETCT   --   189  225   PROTHROMBTM   --   16.5*   --    INR   --   1.29*   --      Recent Labs      17   0600  17   0415   WBC  9.8  9.7   NEUTSPOLYS  74.50*  71.70   LYMPHOCYTES  12.90*  22.10   MONOCYTES  11.00  6.20   EOSINOPHILS  0.80  0.00   BASOPHILS  0.50  0.00   ASTSGOT  13   --    ALTSGPT  9   --    ALKPHOSPHAT  66   --    TBILIRUBIN  0.6   --            Hemodynamics:  Temp (24hrs), Av.9 °C (98.5 °F), Min:36.8 °C (98.3 °F), Max:37.1 °C (98.7 °F)  Temperature: 36.8 °C (98.3 °F)  Pulse  Av.5  Min: 65  Max: 143Heart Rate (Monitored): (!) 109  NIBP: 124/81 mmHg     Did not require pressors,  Respiratory:    Respiration: (!) 23, Pulse Oximetry: 97 %        Fluids:    Intake/Output Summary (Last 24 hours) at 17 0646  Last data filed at 17 0600   Gross per 24 hour   Intake   1150 ml   Output      0 ml   Net   1150 ml     Weight: 61.6 kg (135 lb 12.9 oz)      GI/Nutrition:  Orders Placed This Encounter   Procedures   • DIET ORDER     Standing Status: Standing      Number of Occurrences: 1      Standing Expiration Date:      Order Specific Question:  Diet:     Answer:  Regular [1]     Order Specific Question:  Miscellaneous modifications:     Answer:  [employer]  [2]      Comments:  No sharp objects / legal hold       Medical Decision Making, by Problem:  Active Hospital Problems    Diagnosis   • Respiratory failure (CMS-HCC) [J96.90]   • Altered mental status [R41.82]       Altered mental status, likely drug overdose Marisela Seroquel with acute toxic/metabolic encephalopathy  Acute hypoxic respiratory failure due to AMS  Intubate --liberated    doing well, Cepacol for throat discomfort, tolerating oral, on room air, normal sinus rhythm, tachycardia resolved  Legal hold, one-on-one sitter,--evaluated by Dist. Atty.and will be  reevaluated by Court Luis this afternoon--- follow-up on psych recommendations, follow-up and reevaluation  Follow-up on referral for mental health facility-admitted taking full bottle of Seroquel 400 mg pills, did not take lithium-psych needs to see patient for medication guidance-- follow psych recommendations for psych related medications  After upset after meeting district Atty.--- increase Valium to 10 mg 3 times a day--  PT/Seizure/aspiration/Fall precautions  Repeat EKG for QTC is 419  Transfer to medical floor when bed available and legal hold, one-on-one sitter      Amphetamine abuse /IVDA  History of suicidal attempts/History of substance abuse/PTSD/schizoaffective disorder/self mutilation  Consulted Psych  Difficult social situation  was institutionalized for her psych problems for long time, currently on Seroquel and lithium which she stopped taking 2 weeks prior to admission  Legal hold, one-on-one sitter,   follow psych recommendations for psych related medications  Follow-up on referral for mental health facility    Hypokalemia /hypomagnesemia/hypophasphatemia  Replete as needed    Coagulopathy  --F/u  Last PT/INR 16.5/1.29--improving    Anemia--  menstruation and hemodilution  stable    Sinus tachycardia/Hypotension/Lactic acidosis --  Resolved      EKG reviewed, Radiology images reviewed, Labs reviewed and Medications reviewed  Mckenzie catheter: Critically Ill - Requiring Accurate Measurement of Urinary Output      DVT Prophylaxis: Enoxaparin (Lovenox)  DVT prophylaxis - mechanical: SCDs  Ulcer prophylaxis: Yes

## 2017-04-19 NOTE — PROGRESS NOTES
Assumed care of patient 0700. Received bedside report from REHANA Purcell. Patient comfortable in bed with no concerns, questions or needs at this time. Bed alarm set. Legal hold still in place. Call light within reach. Will continue to closely monitor.

## 2017-04-19 NOTE — CARE PLAN
Problem: Skin Integrity  Goal: Risk for impaired skin integrity will decrease  Intervention: Assess risk factors for impaired skin integrity and/or pressure ulcers  Assessed patient's skin at beginning of shift.       Problem: Mobility  Goal: Risk for activity intolerance will decrease  Intervention: Assess and monitor signs of activity intolerance  Patient ambulates with no assistance from RN.

## 2017-04-19 NOTE — PROGRESS NOTES
Patient states she takes Valium 10mg TID for anxiety. Unable to verify via  aware as she is from [place]. She states hydroxyzine and ativan do not work for her and she has a high tolerance. Patient started on Valium 5mg Q8H PRN for the time being.

## 2017-04-19 NOTE — PROGRESS NOTES
"Pulmonary Critical Care Progress Note    Interval Events:  24 hour interval history reviewed  Reason for visit:  Respiratory failure, acute metabolic encephalopathy, drug overdose  Seen with [facility]    SR  RA  Awaiting psych recommendations  Admitted to taking Seroquel - \"entire bottle\"      PFSH:  No change.    Respiratory:     Pulse Oximetry: 97 %  CXR clear  Lungs clear  RA  No SOB or cough     Recent Labs      04/16/17   1034  04/17/17   0454   ISTATAPH  7.390*  7.331*   ISTATAPCO2  25.2*  34.5   ISTATAPO2  131*  51*   ISTATATCO2  16*  19*   [id number]  99  83*   ISTATARTHCO3  15.2*  18.3   ISTATARTBE  -9*  -7*   ISTATTEMP  97.7 F  97.3 F   ISTATFIO2  30  30   ISTATSPEC  Arterial  Arterial   ISTATAPHTC  7.398*  7.341*   [id number]  128*  48*       HemoDynamics:  Pulse: 82, Heart Rate (Monitored): (!) 109  NIBP: 124/81 mmHg     SR  No angina, palp, syncope  No edema    Neuro:  Awake and alert  No focal weakness  No HA, Sz    Fluids:  Intake/Output       04/17/17 0700 - 04/18/17 0659 04/18/17 0700 - 04/19/17 0659 04/19/17 0700 - 04/20/17 0659      5045-3972 6617-9133 Total 0930-8359 6977-1811 Total 1657-9257 6648-4128 Total       Intake    P.O.  1500  1000 2500  --  1150 1150  --  -- --    P.O. 1500 1000 2500 -- 1150 1150 -- -- --    I.V.  1852  1800 3652  --  -- --  --  -- --    Precedex Volume 52 -- 52 -- -- -- -- -- --    IV Volume (NS) 1800 1800 3600 -- -- -- -- -- --    Total Intake 3352 2800 6152 -- 1150 1150 -- -- --       Output    Urine  7104 8437 3346  --  -- --  --  -- --    Number of Times Voided -- 0 x 0 x 1 x 4 x 5 x -- -- --    Indwelling Cathether 4748 4225 8973 -- -- -- -- -- --    Stool  --  -- --  --  -- --  --  -- --    Number of Times Stooled 0 x 0 x 0 x 0 x -- 0 x -- -- --    Total Output 7433 8022 5572 -- -- -- -- -- --       Net I/O     -1396.1 -1425 -2821.1 -- 1150 1150 -- -- --        Weight: 61.6 kg (135 lb 12.9 oz)  Recent Labs      04/17/17   0600  04/18/17   0415  04/19/17   " 0440   SODIUM  140  142  136   POTASSIUM  3.8  3.6  3.5*   CHLORIDE  114*  111  104   CO2  19*  24  25   BUN  6*  3*  6*   CREATININE  0.70  0.60  0.57   MAGNESIUM  1.9  1.9  1.8   PHOSPHORUS  3.2  2.4*  3.6   CALCIUM  8.6  8.4*  9.4       GI/Nutrition:  Abd soft ND/NT  No N/V/P  Eating    Liver Function  Recent Labs      17   06175  170   ALTSGPT  9   --    --    ASTSGOT  13   --    --    ALKPHOSPHAT  66   --    --    TBILIRUBIN  0.6   --    --    PREALBUMIN  20.0   --    --    GLUCOSE  94  103*  112*       Heme:  Recent Labs      17   1318  17   0617   RBC   --   3.55*  3.66*   HEMOGLOBIN  10.3*  10.7*  10.8*   HEMATOCRIT  31.0*  32.4*  33.3*   PLATELETCT   --   189  225   PROTHROMBTM   --   16.5*   --    INR   --   1.29*   --        Infectious Disease:  Temp  Av.9 °C (98.5 °F)  Min: 36.8 °C (98.3 °F)  Max: 37.1 °C (98.7 °F)    Recent Labs      17   06175   WBC  9.8  9.7   NEUTSPOLYS  74.50*  71.70   LYMPHOCYTES  12.90*  22.10   MONOCYTES  11.00  6.20   EOSINOPHILS  0.80  0.00   BASOPHILS  0.50  0.00   ASTSGOT  13   --    ALTSGPT  9   --    ALKPHOSPHAT  66   --    TBILIRUBIN  0.6   --      Current Facility-Administered Medications   Medication Dose Frequency Provider Last Rate Last Dose   • magnesium sulfate IVPB premix 2 g  2 g Once Miller Merritt M.D.       • potassium chloride SA (Kdur) tablet 60 mEq  60 mEq Once Miller Merritt M.D.       • nicotine (NICODERM) 21 MG/24HR 21 mg  21 mg Daily-0600 Peter Tovar M.D.   21 mg at 17   • diazepam (VALIUM) tablet 5 mg  5 mg Q8HRS PRN Sravani Evans M.D.   5 mg at 17 1953   • enoxaparin (LOVENOX) inj 40 mg  40 mg DAILY Vicente Roldan M.D.   40 mg at 17 09   • benzocaine-menthol (CEPACOL) lozenge 1 Lozenge  1 Lozenge Q2HRS PRN Vicente Roldan M.D.   1 Lozenge at 17   • senna-docusate (PERICOLACE or SENOKOT S) 8.6-50 MG per tablet  2 Tab  2 Tab BID Chris Hernandez M.D.   2 Tab at 04/17/17 2109    And   • polyethylene glycol/lytes (MIRALAX) PACKET 1 Packet  1 Packet QDAY PRN Chris Hernandez M.D.        And   • magnesium hydroxide (MILK OF MAGNESIA) suspension 30 mL  30 mL QDAY PRN Chris Hernandez M.D.       • ondansetron (ZOFRAN) syringe/vial injection 4 mg  4 mg Q4HRS PRN Chris Hernandez M.D.       • prochlorperazine (COMPAZINE) injection 5-10 mg  5-10 mg Q4HRS PRN Chris Hernandez M.D.       • acetaminophen (TYLENOL) tablet 650 mg  650 mg Q6HRS PRN Chris Hernandez M.D.   650 mg at 04/18/17 0412   • nicotine polacrilex (NICORETTE) 2 MG piece 2 mg  2 mg Q HOUR PRN Chris Hernandez M.D.       • Respiratory Care per Protocol   Continuous RT Joao Jakcson M.D.       • lactulose 20 GM/30ML solution 30 mL  30 mL Q24HRS PRN Joao Jackson M.D.       • bisacodyl (DULCOLAX) suppository 10 mg  10 mg Q24HRS PRN Joao Jackson M.D.       • fleet enema 133 mL  1 Each Once PRN Joao Jackson M.D.       • MD ALERT...Adult ICU Electrolyte Replacement per Pharmacy Protocol   pharmacy to dose Joao Jackson M.D.       • ipratropium-albuterol (DUONEB) nebulizer solution 3 mL  3 mL Q2HRS PRN (RT) Joao Jackson M.D.         Last reviewed on 4/16/2017  2:37 PM by Sara Rodriguez, Pharmacy Int    Quality  Measures:  Labs reviewed, Medications reviewed and Radiology images reviewed  Mckenzie catheter: Critically Ill - Requiring Accurate Measurement of Urinary Output  Central line in place: Need for access    DVT Prophylaxis: Enoxaparin (Lovenox)  DVT prophylaxis - mechanical: SCDs  Ulcer prophylaxis: Yes            Assessment and Plan:    S/P VDRF - intubated 4/15, liberated 4/17  Acute metabolic encephalopathy   - head CT negative   - presumed drug overdose (Seroquel)    - legal hold   - improved   - ammonia normal  Positive urine drug screen for amphetamines  Psychiatric disorders - extensive history   - psychiatry on board    OK to transfer to medical bed.   [facility] will sign off on transfer.    Discussed with RN, RT, Team

## 2017-04-19 NOTE — DISCHARGE SUMMARY
[facility] [facility] [place]      Admit Date:  4/15/2017       Discharge Date:   4/21/2017    Service:   [facility] Internal Medicine [facility] Team  Attending Physician(s):   Dr. Hansen       Senior Resident(s):   Dr. Evans      Primary Diagnosis:     Diagnosis:  Altered mental status, likely drug overdose LiMariam with acute toxic/metabolic encephalopathy  Acute hypoxic respiratory failure due to AMS    Secondary Diagnoses:      Sinus tachycardia/Hypotension/Lactic acidosis --Resolved  Amphetamine abuse /IVDA  History of suicidal attempts/History of substance abuse/PTSD/schizoaffective disorder/self mutilation  Hypokalemia /hypomagnesemia/hypophasphatemia  Coagulopathy--F/u  Anemia--menstruation and hemodilution    Hospital Summary (Brief Narrative):         25 y/o F admitted after a witnessed ground-level fall outside her apartment with a bag of lithium and Seroquel by her side. Her lithium level was undetectable. Urine toxicology was positive for amphetamine. [facility] poison control was contacted (case number [id number]) and her QTC has been stable  She was initially hypotensive and tachycardic which resolved after intravenous fluids. She was intubated for airway protection and was extubated 4/17. Psychiatry was consulted and she was started on seroquel 200mg. Patient will be discharged and going to [place] where her father lives. She will be discharged on seroqeul 200 mg and lithium     Consultants:     Pulmonary/Critical care  Psychiatry    Procedures:        CL  intubation    Imaging/ Testing:      DX-CHEST-PORTABLE (1 VIEW)   Final Result      No significant change from prior exam.         DX-CHEST-PORTABLE (1 VIEW)   Final Result      1.  Supportive tubing as described above.   2.  No pneumothorax.   3.  No other significant change from prior exam.            US-ABDOMEN LIMITED   Final Result      Limited abdominal ultrasound showing no free fluid.      CT-CSPINE WITHOUT PLUS RECONS   Final  Result      1.  Straightening of the cervical spine.   2.  No fracture or subluxation.         CT-HEAD W/O   Final Result      No acute intracranial abnormality.      DX-CHEST-LIMITED (1 VIEW)   Final Result      1.  No evidence for acute intrathoracic injury.   2.  Supportive tubing as described above.            Discharge Medications:         Medication Reconciliation: Completed     Medication List      START taking these medications       Instructions    diazepam 10 MG tablet   Last time this was given:  10 mg on 4/20/2017  8:48 PM   Commonly known as:  VALIUM    Take 1 Tab by mouth every 8 hours as needed for Anxiety.   Dose:  10 mg         CHANGE how you take these medications       Instructions    lithium  MG Tbcr   What changed:    - medication strength  - how much to take  - when to take this   Last time this was given:  900 mg on 4/20/2017  8:45 PM   Commonly known as:  ESKALITH CR    Take 2 Tabs by mouth every day.   Dose:  900 mg       quetiapine 200 MG Tabs   What changed:  how much to take   Last time this was given:  200 mg on 4/20/2017  8:45 PM   Commonly known as:  SEROQUEL    Take 1 Tab by mouth every evening.   Dose:  200 mg                 Disposition:   Home to [place] with father    Diet:   As tolerated    Activity:   As tolerated    Instructions:         The patient was instructed to return to the ER in the event of worsening symptoms. I have counseled the patient on the importance of compliance and the patient has agreed to proceed with all medical recommendations and follow up plan indicated above.   The patient understands that all medications come with benefits and risks. Risks may include permanent injury or death and these risks can be minimized with close reassessment and monitoring.        Primary Care Provider:      Discharge summary faxed to primary care provider:  Completed  Copy of discharge summary given to the patient: Completed    Follow up appointment details :       none    Pending Studies:        none    Time spent on discharge day patient visit, preparing discharge paperwork and arranging for patient follow up.    Summary of follow up issues:   Patient to follow up with psychiatry    Discharge Time (Minutes) :    45 minutes      Condition on Discharge    ______________________________________________________________________    Interval history/exam for day of discharge:     WILLIE overnight. She will be discharged today with her parents and she will be going to [place].    Filed Vitals:    04/20/17 0400 04/20/17 0900 04/20/17 2000 04/21/17 0800   BP:       Pulse:  114 108    Temp:  37 °C (98.6 °F) 37.1 °C (98.8 °F) 36.7 °C (98 °F)   Resp:  16 15    Height:       Weight: 61.7 kg (136 lb 0.4 oz)      SpO2:  96% 96%      Weight/BMI: Body mass index is 21.3 kg/(m^2).  Pulse Oximetry: 96 %, O2 Delivery: None (Room Air)    General: NAD, laying comfortably in bed  CVS: RRR, no mrg  PULM: CTAB, no crackles/wheeze      Most Recent Labs:    Lab Results   Component Value Date/Time    WBC 9.7 04/18/2017 04:15 AM    RBC 3.66* 04/18/2017 04:15 AM    HEMOGLOBIN 10.8* 04/18/2017 04:15 AM    HEMATOCRIT 33.3* 04/18/2017 04:15 AM    MCV 91.0 04/18/2017 04:15 AM    MCH 29.5 04/18/2017 04:15 AM    MCHC 32.4* 04/18/2017 04:15 AM    MPV 9.8 04/18/2017 04:15 AM    NEUTROPHILS-POLYS 71.70 04/18/2017 04:15 AM    LYMPHOCYTES 22.10 04/18/2017 04:15 AM    MONOCYTES 6.20 04/18/2017 04:15 AM    EOSINOPHILS 0.00 04/18/2017 04:15 AM    BASOPHILS 0.00 04/18/2017 04:15 AM      Lab Results   Component Value Date/Time    SODIUM 136 04/19/2017 04:40 AM    POTASSIUM 3.5* 04/19/2017 04:40 AM    CHLORIDE 104 04/19/2017 04:40 AM    CO2 25 04/19/2017 04:40 AM    GLUCOSE 112* 04/19/2017 04:40 AM    BUN 6* 04/19/2017 04:40 AM    CREATININE 0.57 04/19/2017 04:40 AM      Lab Results   Component Value Date/Time    ALT(SGPT) 9 04/17/2017 06:00 AM    AST(SGOT) 13 04/17/2017 06:00 AM    ALKALINE PHOSPHATASE 66 04/17/2017  06:00 AM    TOTAL BILIRUBIN 0.6 04/17/2017 06:00 AM    ALBUMIN 2.9* 04/17/2017 06:00 AM    GLOBULIN 2.5 04/17/2017 06:00 AM    PRE-ALBUMIN 20.0 04/17/2017 06:00 AM    INR 1.29* 04/17/2017 06:00 AM     Lab Results   Component Value Date/Time    PT 16.5* 04/17/2017 06:00 AM    INR 1.29* 04/17/2017 06:00 AM

## 2017-04-19 NOTE — DISCHARGE PLANNING
Pt met with a  representative at bedside. After speaking with the PD, it was decided that pt is to meet with court doctors later this afternoon. SS will call with court time when obtained.

## 2017-04-20 PROCEDURE — A9270 NON-COVERED ITEM OR SERVICE: HCPCS | Performed by: INTERNAL MEDICINE

## 2017-04-20 PROCEDURE — 770006 HCHG ROOM/CARE - MED/SURG/GYN SEMI*

## 2017-04-20 PROCEDURE — A9270 NON-COVERED ITEM OR SERVICE: HCPCS | Performed by: STUDENT IN AN ORGANIZED HEALTH CARE EDUCATION/TRAINING PROGRAM

## 2017-04-20 PROCEDURE — 99233 SBSQ HOSP IP/OBS HIGH 50: CPT | Mod: GC | Performed by: INTERNAL MEDICINE

## 2017-04-20 PROCEDURE — 700102 HCHG RX REV CODE 250 W/ 637 OVERRIDE(OP): Performed by: INTERNAL MEDICINE

## 2017-04-20 PROCEDURE — A9270 NON-COVERED ITEM OR SERVICE: HCPCS | Performed by: PSYCHIATRY & NEUROLOGY

## 2017-04-20 PROCEDURE — 700102 HCHG RX REV CODE 250 W/ 637 OVERRIDE(OP): Performed by: PSYCHIATRY & NEUROLOGY

## 2017-04-20 PROCEDURE — 700102 HCHG RX REV CODE 250 W/ 637 OVERRIDE(OP): Performed by: STUDENT IN AN ORGANIZED HEALTH CARE EDUCATION/TRAINING PROGRAM

## 2017-04-20 RX ORDER — MAGNESIUM SULFATE HEPTAHYDRATE 40 MG/ML
2 INJECTION, SOLUTION INTRAVENOUS ONCE
Status: DISCONTINUED | OUTPATIENT
Start: 2017-04-20 | End: 2017-04-20

## 2017-04-20 RX ORDER — POTASSIUM CHLORIDE 1.5 G/1.58G
40 POWDER, FOR SOLUTION ORAL ONCE
Status: ACTIVE | OUTPATIENT
Start: 2017-04-20 | End: 2017-04-21

## 2017-04-20 RX ADMIN — LITHIUM CARBONATE 900 MG: 450 TABLET, EXTENDED RELEASE ORAL at 20:45

## 2017-04-20 RX ADMIN — DIAZEPAM 10 MG: 5 TABLET ORAL at 20:48

## 2017-04-20 RX ADMIN — DIAZEPAM 10 MG: 5 TABLET ORAL at 10:53

## 2017-04-20 RX ADMIN — QUETIAPINE FUMARATE 200 MG: 100 TABLET, FILM COATED ORAL at 20:45

## 2017-04-20 RX ADMIN — NICOTINE 21 MG: 21 PATCH TRANSDERMAL at 05:11

## 2017-04-20 ASSESSMENT — ENCOUNTER SYMPTOMS
HEARTBURN: 0
HEMOPTYSIS: 0
CHILLS: 0
BLURRED VISION: 0
PALPITATIONS: 0
COUGH: 1
SORE THROAT: 1
DOUBLE VISION: 0
HEADACHES: 0
NAUSEA: 0
FEVER: 0

## 2017-04-20 ASSESSMENT — PAIN SCALES - GENERAL
PAINLEVEL_OUTOF10: 0
PAINLEVEL_OUTOF10: 0
PAINLEVEL_OUTOF10: 2
PAINLEVEL_OUTOF10: 2
PAINLEVEL_OUTOF10: 0

## 2017-04-20 ASSESSMENT — LIFESTYLE VARIABLES: SUBSTANCE_ABUSE: 1

## 2017-04-20 NOTE — PROGRESS NOTES
Patient rested/slept all night with no issues, however, after 1 unsuccessful IV attempt, refused IV placement and lab draw. Educated patient and agreed to have labs drawn later.

## 2017-04-20 NOTE — PROGRESS NOTES
Pulmonary Critical Care Progress Note    Date of service: 2017    Interval Events:  24 hour interval history reviewed  Reason for visit:  Respiratory failure, acute metabolic encephalopathy, drug overdose  Seen with [facility]      RA  Lithium and Seroquel       PFSH:  No change.    Respiratory:     Pulse Oximetry: 100 %  CXR clear  Lungs clear  RA  No SOB or cough    HemoDynamics:  Pulse: 80  NIBP: 104/70 mmHg     SR  No angina, palp, syncope  No edema    Neuro:  Awake and alert  No focal weakness  No HA, Sz    Fluids:  Intake/Output       17 0700 - 17 0659 17 0700 - 17 0659 17 0700 - 17 0659      8095-5607 5788-7637 Total 9336-1196 1457-3901 Total 2180-6940 9684-9069 Total       Intake    P.O.  --  1150 1150  2100  1180 3280  --  -- --    P.O. -- 1150 1150 2100 1180 3280 -- -- --    I.V.  --  -- --  100  -- 100  --  -- --    IV Piggyback Volume (IV Piggyback) -- -- -- 100 -- 100 -- -- --    Total Intake -- 1150 1150 2200 1180 3380 -- -- --       Output    Urine  --  -- --  --  -- --  --  -- --    Number of Times Voided 1 x 4 x 5 x 6 x -- 6 x -- -- --    Stool  --  -- --  --  -- --  --  -- --    Number of Times Stooled 0 x -- 0 x 0 x 0 x 0 x -- -- --    Total Output -- -- -- -- -- -- -- -- --       Net I/O     -- 1150 1150 2200 1180 3380 -- -- --        Weight: 61.7 kg (136 lb 0.4 oz)  Recent Labs      17   0415  17   0440   SODIUM  142  136   POTASSIUM  3.6  3.5*   CHLORIDE  111  104   CO2  24  25   BUN  3*  6*   CREATININE  0.60  0.57   MAGNESIUM  1.9  1.8   PHOSPHORUS  2.4*  3.6   CALCIUM  8.4*  9.4       GI/Nutrition:  Abd soft ND/NT  No N/V/P  Eating    Liver Function  Recent Labs      17   0415  170   GLUCOSE  103*  112*       Heme:  Recent Labs      17   RBC  3.66*   HEMOGLOBIN  10.8*   HEMATOCRIT  33.3*   PLATELETCT  225       Infectious Disease:  Temp  Av.9 °C (98.5 °F)  Min: 36.8 °C (98.2 °F)  Max: 37.1 °C (98.7  °F)    Recent Labs      04/18/17 0415   WBC  9.7   NEUTSPOLYS  71.70   LYMPHOCYTES  22.10   MONOCYTES  6.20   EOSINOPHILS  0.00   BASOPHILS  0.00     Current Facility-Administered Medications   Medication Dose Frequency Provider Last Rate Last Dose   • diazepam (VALIUM) tablet 10 mg  10 mg Q8HRS PRN Miller Merritt M.D.   10 mg at 04/19/17 2132   • lithium CR (ESKALITH CR) tablet 900 mg  900 mg Nightly Gail Anders M.D.   900 mg at 04/19/17 2121   • quetiapine (SEROQUEL) tablet 200 mg  200 mg Q EVENING Gail Anders M.D.   200 mg at 04/19/17 2121   • nicotine (NICODERM) 21 MG/24HR 21 mg  21 mg Daily-0600 Peter Tovar M.D.   21 mg at 04/20/17 0511   • enoxaparin (LOVENOX) inj 40 mg  40 mg DAILY Vicente Roldan M.D.   40 mg at 04/19/17 0817   • benzocaine-menthol (CEPACOL) lozenge 1 Lozenge  1 Lozenge Q2HRS PRN Vicente Roldan M.D.   1 Lozenge at 04/17/17 2109   • senna-docusate (PERICOLACE or SENOKOT S) 8.6-50 MG per tablet 2 Tab  2 Tab BID Chris Hernandez M.D.   2 Tab at 04/17/17 2109    And   • polyethylene glycol/lytes (MIRALAX) PACKET 1 Packet  1 Packet QDAY PRN Chris Hernandez M.D.        And   • magnesium hydroxide (MILK OF MAGNESIA) suspension 30 mL  30 mL QDAY PRN Chris Hernandez M.D.       • ondansetron (ZOFRAN) syringe/vial injection 4 mg  4 mg Q4HRS PRN Chris Hernandez M.D.       • prochlorperazine (COMPAZINE) injection 5-10 mg  5-10 mg Q4HRS PRN Chris Hernandez M.D.       • acetaminophen (TYLENOL) tablet 650 mg  650 mg Q6HRS PRN Chris Hernandez M.D.   650 mg at 04/18/17 0412   • nicotine polacrilex (NICORETTE) 2 MG piece 2 mg  2 mg Q HOUR PRN Chris Hernandez M.D.       • Respiratory Care per Protocol   Continuous RT Joao Jackson M.D.       • lactulose 20 GM/30ML solution 30 mL  30 mL Q24HRS PRN Joao Jackson M.D.       • bisacodyl (DULCOLAX) suppository 10 mg  10 mg Q24HRS PRN Joao Jackson M.D.       • fleet enema 133 mL  1 Each Once PRN Joao Jackson M.D.       • MD  ALERT...Adult ICU Electrolyte Replacement per Pharmacy Protocol   pharmacy to dose Joao Jackson M.D.       • ipratropium-albuterol (DUONEB) nebulizer solution 3 mL  3 mL Q2HRS PRN (RT) Joao Jackson M.D.         Last reviewed on 4/16/2017  2:37 PM by Sara Rodriguez, Pharmacy Int    Quality  Measures:  Labs reviewed, Medications reviewed and Radiology images reviewed  Mckenzie catheter: Critically Ill - Requiring Accurate Measurement of Urinary Output  Central line in place: Need for access    DVT Prophylaxis: Enoxaparin (Lovenox)  DVT prophylaxis - mechanical: SCDs  Ulcer prophylaxis: Yes            Assessment and Plan:    S/P VDRF - intubated 4/15, liberated 4/17  Acute metabolic encephalopathy   - head CT negative   - presumed drug overdose (Seroquel)   - resolved   - ammonia normal  Positive urine drug screen for amphetamines  Psychiatric disorders - extensive history   - psychiatry on board   - Lithium and Seroquel    OK to transfer to medical bed.  [facility] will sign off on transfer.    Discussed with RN, RT, Team     I, Ash oCon (Je), am scribing for, and in the presence of, Vicente Murphy M.D.    Electronically signed by: Ash Coon (Je), 4/20/2017    IVicente M.D. personally performed the services described in this documentation, as scribed by Ash Coon in my presence, and it is both accurate and complete.

## 2017-04-20 NOTE — PROGRESS NOTES
At start 1930, pt in restroom taking a shower with no sitter. Called female CNA to  and be with the patient until she finished showering. When patient out of shower, found that she pulled her only IV due to it being red and sore. Agreed to a new IV placement. AOx4 with sore throat.     Says she takes 800 mg total of Seroquel at home rather than the 200 mg scheduled, but agreed to take the 200 mg of Seroquel. Will address with day RN.

## 2017-04-20 NOTE — PROGRESS NOTES
[facility] GOLD PROGRESS NOTE               Author: Sravani Evans Date & Time created: 4/20/2017  4:03 PM           Diagnosis:  Altered mental status, likely drug overdose Mariam Antonio with acute toxic/metabolic encephalopathy  Acute hypoxic respiratory failure due to AMS  Sinus tachycardia/Hypotension/Lactic acidosis --Resolved  Amphetamine abuse /IVDA  History of suicidal attempts/History of substance abuse/PTSD/schizoaffective disorder/self mutilation  Hypokalemia /hypomagnesemia/hypophasphatemia  Coagulopathy--F/u  Anemia--menstruation and hemodilution    ID:  26-year-old young lady with above-mentioned past medical history, difficult social situation, was institutionalized for her psych problems for long time, currently on Seroquel and lithium which she stopped taking 2 weeks prior to admission, was admitted after a witnessed ground-level fall outside her apartment with a bag of lithium and Seroquel by her side. .      Interval History:  WILLIE overnight. Patient states she takes seroquel 800mg, psych started patient on 200mg. Will defer to psych for titrating up meds.     Review of Systems:  Review of Systems   Constitutional: Negative for fever and chills.   HENT: Positive for sore throat. Negative for hearing loss.    Eyes: Negative for blurred vision and double vision.   Respiratory: Positive for cough. Negative for hemoptysis.    Cardiovascular: Negative for chest pain and palpitations.   Gastrointestinal: Negative for heartburn and nausea.   Genitourinary: Negative for dysuria and urgency.   Neurological: Negative for headaches.   Psychiatric/Behavioral: Positive for suicidal ideas and substance abuse.       Physical Exam:  Physical Exam   Constitutional: She appears well-developed.   Eyes: Pupils are equal, round, and reactive to light.   Neck: No tracheal deviation present. No thyromegaly present.   Cardiovascular: Normal rate.  Exam reveals no gallop and no friction rub.    No murmur heard.  Pulmonary/Chest:  No respiratory distress. She has no wheezes. She has no rales.   Abdominal: She exhibits no distension. There is no tenderness.   Genitourinary: Guaiac negative stool.   Musculoskeletal: She exhibits no edema.   Neurological: No cranial nerve deficit.   , Anxious, crying    Skin: No rash noted. She is not diaphoretic. No erythema.       Labs:        Invalid input(s): [id number]      Recent Labs      17   0415  17   0440   SODIUM  142  136   POTASSIUM  3.6  3.5*   CHLORIDE  111  104   CO2  24  25   BUN  3*  6*   CREATININE  0.60  0.57   MAGNESIUM  1.9  1.8   PHOSPHORUS  2.4*  3.6   CALCIUM  8.4*  9.4     Recent Labs      17   0415  17   0440   GLUCOSE  103*  112*     Recent Labs      17   0415   RBC  3.66*   HEMOGLOBIN  10.8*   HEMATOCRIT  33.3*   PLATELETCT  225     Recent Labs      17   0415   WBC  9.7   NEUTSPOLYS  71.70   LYMPHOCYTES  22.10   MONOCYTES  6.20   EOSINOPHILS  0.00   BASOPHILS  0.00           Hemodynamics:  Temp (24hrs), Av.9 °C (98.4 °F), Min:36.8 °C (98.2 °F), Max:37 °C (98.6 °F)  Temperature: 37 °C (98.6 °F)  Pulse  Av.2  Min: 65  Max: 143  NIBP: (!) 92/61 mmHg     Did not require pressors,  Respiratory:    Respiration: 16, Pulse Oximetry: 96 %        Fluids:    Intake/Output Summary (Last 24 hours) at 17 1603  Last data filed at 17 1200   Gross per 24 hour   Intake   1960 ml   Output      0 ml   Net   1960 ml     Weight: 61.7 kg (136 lb 0.4 oz)      GI/Nutrition:  Orders Placed This Encounter   Procedures   • DIET ORDER     Standing Status: Standing      Number of Occurrences: 1      Standing Expiration Date:      Order Specific Question:  Diet:     Answer:  Regular [1]     Order Specific Question:  Miscellaneous modifications:     Answer:  [employer]  [2]      Comments:  No sharp objects / legal hold       Medical Decision Making, by Problem:  Active Hospital Problems    Diagnosis   • Respiratory failure (CMS-HCC) [J96.90]   •  Altered mental status [R41.82]       Altered mental status, likely drug overdose Marisela Sersheila with acute toxic/metabolic encephalopathy  Acute hypoxic respiratory failure due to AMS  Intubate 04/16--liberated 04/17   doing well, Cepacol for throat discomfort, tolerating oral, on room air, normal sinus rhythm, tachycardia resolved  Legal hold, one-on-one sitter,--evaluated by Dist. Att  Follow-up on referral for mental health facility-admitted taking full bottle of Seroquel 400 mg pills, did not take lithium-psych needs to see patient for medication guidance-- follow psych recommendations for psych related medications  Cont valium 10mg TID  PT/Seizure/aspiration/Fall precautions  Repeat EKG for QTC is 419  Transfer to medical floor when bed available and legal hold, one-on-one sitter      Amphetamine abuse /IVDA  History of suicidal attempts/History of substance abuse/PTSD/schizoaffective disorder/self mutilation  Consulted Psych  Difficult social situation  was institutionalized for her psych problems for long time, currently on Seroquel and lithium which she stopped taking 2 weeks prior to admission  Legal hold, one-on-one sitter,   follow psych recommendations for psych related medications  Follow-up on referral for mental health facility    Hypokalemia /hypomagnesemia/hypophasphatemia  Replete as needed    Coagulopathy  --F/u  Last PT/INR 16.5/1.29--improving    Anemia--  menstruation and hemodilution  stable    Sinus tachycardia/Hypotension/Lactic acidosis --  Resolved      EKG reviewed, Radiology images reviewed, Labs reviewed and Medications reviewed  Mcknezie catheter: Critically Ill - Requiring Accurate Measurement of Urinary Output      DVT Prophylaxis: Enoxaparin (Lovenox)  DVT prophylaxis - mechanical: SCDs  Ulcer prophylaxis: Yes

## 2017-04-20 NOTE — CARE PLAN
Problem: Safety  Goal: Will remain free from falls  Outcome: PROGRESSING AS EXPECTED  Patient will remain free from falls. Interventions: Patient has treaded slipper socks, sitter with patient, mobilization assessed, bed alarm on and mobility assessment signs posted outside of door.      Problem: Psychosocial Needs:  Goal: Level of anxiety will decrease  Outcome: PROGRESSING SLOWER THAN EXPECTED  Patient's anxiety will be decreased and will addressed with kindness and respect. Continual reassurance, limitation discussed and clearly stated, allowing patient to express needs, per physician okay to communicate with family, q15 min safety checks, distraction and reassurance.

## 2017-04-20 NOTE — PSYCHIATRY
PSYCHIATRIC CONSULTATION:seen. Full note to follow. Legal hold reportedly dc'd however there are no documents available that state this. She is NOT to be released until we have those documents in hand. In the meanwhile and with anticipation of dc, lithium and seroquel have been started though the seroquel is at 200 mg and not 400 mg as usual. Spoke with mom as well. Social service already working on dc plan which appears sending patient to dad in [place], or dad coming here. Will follow.

## 2017-04-21 VITALS
DIASTOLIC BLOOD PRESSURE: 47 MMHG | WEIGHT: 136.02 LBS | OXYGEN SATURATION: 96 % | SYSTOLIC BLOOD PRESSURE: 98 MMHG | BODY MASS INDEX: 21.35 KG/M2 | TEMPERATURE: 98 F | RESPIRATION RATE: 15 BRPM | HEART RATE: 99 BPM | HEIGHT: 67 IN

## 2017-04-21 PROCEDURE — 99233 SBSQ HOSP IP/OBS HIGH 50: CPT | Mod: GC | Performed by: INTERNAL MEDICINE

## 2017-04-21 PROCEDURE — A9270 NON-COVERED ITEM OR SERVICE: HCPCS | Performed by: INTERNAL MEDICINE

## 2017-04-21 PROCEDURE — 700102 HCHG RX REV CODE 250 W/ 637 OVERRIDE(OP): Performed by: INTERNAL MEDICINE

## 2017-04-21 RX ORDER — DIAZEPAM 10 MG/1
10 TABLET ORAL EVERY 8 HOURS PRN
Qty: 30 TAB | Refills: 0 | Status: SHIPPED | OUTPATIENT
Start: 2017-04-21 | End: 2017-04-21

## 2017-04-21 RX ORDER — QUETIAPINE FUMARATE 200 MG/1
200 TABLET, FILM COATED ORAL EVERY EVENING
Qty: 30 TAB | Refills: 0 | Status: SHIPPED | OUTPATIENT
Start: 2017-04-21 | End: 2017-04-21

## 2017-04-21 RX ORDER — LITHIUM CARBONATE 450 MG
900 TABLET, EXTENDED RELEASE ORAL DAILY
Qty: 60 TAB | Refills: 0 | Status: SHIPPED | OUTPATIENT
Start: 2017-04-21 | End: 2017-04-21

## 2017-04-21 RX ORDER — LITHIUM CARBONATE 450 MG
900 TABLET, EXTENDED RELEASE ORAL DAILY
Qty: 60 TAB | Refills: 0 | Status: SHIPPED | OUTPATIENT
Start: 2017-04-21 | End: 2017-05-23

## 2017-04-21 RX ORDER — QUETIAPINE FUMARATE 200 MG/1
200 TABLET, FILM COATED ORAL EVERY EVENING
Qty: 30 TAB | Refills: 0 | Status: SHIPPED | OUTPATIENT
Start: 2017-04-21 | End: 2017-05-23

## 2017-04-21 RX ORDER — DIAZEPAM 10 MG/1
10 TABLET ORAL EVERY 8 HOURS PRN
Qty: 30 TAB | Refills: 0 | Status: SHIPPED | OUTPATIENT
Start: 2017-04-21 | End: 2017-05-23

## 2017-04-21 RX ADMIN — NICOTINE 21 MG: 21 PATCH TRANSDERMAL at 06:00

## 2017-04-21 ASSESSMENT — PAIN SCALES - GENERAL
PAINLEVEL_OUTOF10: 0

## 2017-04-21 ASSESSMENT — LIFESTYLE VARIABLES: ALCOHOL_USE: NO

## 2017-04-21 NOTE — DISCHARGE INSTRUCTIONS
Discharge Instructions    Discharged to home by car with relative. Discharged via wheelchair, hospital escort: Yes.  Special equipment needed: Not Applicable    Be sure to schedule a follow-up appointment with your primary care doctor or any specialists as instructed.     Discharge Plan:   Smoking Cessation Offered: Patient Counseled  Influenza Vaccine Indication: Patient Refuses    I understand that a diet low in cholesterol, fat, and sodium is recommended for good health. Unless I have been given specific instructions below for another diet, I accept this instruction as my diet prescription.   Other diet: as tolerated    Special Instructions:   Prescriptions given to family    · Is patient discharged on Warfarin / Coumadin?   No     · Is patient Post Blood Transfusion?  No    Depression / Suicide Risk    As you are discharged from this [facility] facility, it is important to learn how to keep safe from harming yourself.    Recognize the warning signs:  · Abrupt changes in personality, positive or negative- including increase in energy   · Giving away possessions  · Change in eating patterns- significant weight changes-  positive or negative  · Change in sleeping patterns- unable to sleep or sleeping all the time   · Unwillingness or inability to communicate  · Depression  · Unusual sadness, discouragement and loneliness  · Talk of wanting to die  · Neglect of personal appearance   · Rebelliousness- reckless behavior  · Withdrawal from people/activities they love  · Confusion- inability to concentrate     If you or a loved one observes any of these behaviors or has concerns about self-harm, here's what you can do:  · Talk about it- your feelings and reasons for harming yourself  · Remove any means that you might use to hurt yourself (examples: pills, rope, extension cords, firearm)  · Get professional help from the community (Mental Health, Substance Abuse, psychological counseling)  · Do not be alone:Call your  Safe Contact- someone whom you trust who will be there for you.  · Call your local CRISIS HOTLINE 379-6292 or 218-650-3640  · Call your local Children's Mobile Crisis Response Team [place] (216) 670-4972 or www.Riboxx  · Call the toll free National Suicide Prevention Hotlines   · National Suicide Prevention Lifeline 346-382-NJMU (7758)  · National Alu[employer] Line Network 800-SUICIDE (700-6556)

## 2017-04-21 NOTE — PROGRESS NOTES
Spoke with parent with plans to pick pt at 1300, will need prescription for lithium and seroquel as verbalized, KAREEM and MD informed.

## 2017-04-21 NOTE — PROGRESS NOTES
Pulmonary Critical Care Progress Note    Date of service: 2017    Interval Events:  24 hour interval history reviewed  Reason for visit:  Respiratory failure, acute metabolic encephalopathy, drug overdose  Seen with [facility]      RA  Lithium and Seroquel continue      PFSH:  No change.    Respiratory:     Pulse Oximetry: 96 %  CXR clear  Lungs clear - no wheezing, crackles or dullness  RA  No SOB or cough    HemoDynamics:  Pulse: (!) 108  NIBP: 101/72 mmHg     SR  No angina, palp, syncope  No edema    Neuro:  Awake and alert  No focal weakness  No HA, Sz    Fluids:  Intake/Output       17 0700 - 17 0659 17 0700 - 17 0659 17 0700 - 17 0659      [id number] 6662-4200 Total [id number] 8691-4267 Total 6244-0363 7808-1129 Total       Intake    P.O.  2100  1180 3280  480  600 1080  --  -- --    P.O. 2100 1180 3280  -- -- --    I.V.  100  -- 100  --  -- --  --  -- --    IV Piggyback Volume (IV Piggyback) 100 -- 100 -- -- -- -- -- --    Total Intake 2200 1180 3380  -- -- --       Output    Urine  --  -- --  --  -- --  --  -- --    Number of Times Voided 6 x -- 6 x 1 x 3 x 4 x -- -- --    Stool  --  -- --  --  -- --  --  -- --    Number of Times Stooled 0 x 0 x 0 x -- -- -- -- -- --    Total Output -- -- -- -- -- -- -- -- --       Net I/O     2200 1180 3380  -- -- --           Recent Labs      17   0440   SODIUM  136   POTASSIUM  3.5*   CHLORIDE  104   CO2  25   BUN  6*   CREATININE  0.57   MAGNESIUM  1.8   PHOSPHORUS  3.6   CALCIUM  9.4       GI/Nutrition:  Abd soft ND/NT  No N/V/P  Eating    Liver Function  Recent Labs      17   0440   GLUCOSE  112*       Heme:  No results for input(s): RBC, HEMOGLOBIN, HEMATOCRIT, PLATELETCT, PROTHROMBTM, APTT, INR, IRON, FERRITIN, TOTIRONBC in the last 72 hours.    Infectious Disease:  Temp  Av.1 °C (98.7 °F)  Min: 37 °C (98.6 °F)  Max: 37.1 °C (98.8 °F)        Current Facility-Administered  Medications   Medication Dose Frequency Provider Last Rate Last Dose   • potassium chloride (KLOR-CON) 20 MEQ packet 40 mEq  40 mEq Once Brayden Arana PHARMD   40 mEq at 04/20/17 1010   • diazepam (VALIUM) tablet 10 mg  10 mg Q8HRS PRN Miller Merritt M.D.   10 mg at 04/20/17 2048   • lithium CR (ESKALITH CR) tablet 900 mg  900 mg Nightly Gail Anders M.D.   900 mg at 04/20/17 2045   • quetiapine (SEROQUEL) tablet 200 mg  200 mg Q EVENING Gail Anders M.D.   200 mg at 04/20/17 2045   • nicotine (NICODERM) 21 MG/24HR 21 mg  21 mg Daily-0600 Peter Tovar M.D.   21 mg at 04/21/17 0600   • enoxaparin (LOVENOX) inj 40 mg  40 mg DAILY Vicente Roldan M.D.   40 mg at 04/19/17 0817   • benzocaine-menthol (CEPACOL) lozenge 1 Lozenge  1 Lozenge Q2HRS PRN Vicente Roldan M.D.   1 Lozenge at 04/17/17 2109   • senna-docusate (PERICOLACE or SENOKOT S) 8.6-50 MG per tablet 2 Tab  2 Tab BID Chris Hernandez M.D.   2 Tab at 04/17/17 2109    And   • polyethylene glycol/lytes (MIRALAX) PACKET 1 Packet  1 Packet QDAY PRN Chris Hernandez M.D.        And   • magnesium hydroxide (MILK OF MAGNESIA) suspension 30 mL  30 mL QDAY PRN Chris Hernandez M.D.       • ondansetron (ZOFRAN) syringe/vial injection 4 mg  4 mg Q4HRS PRN Chris Hernandez M.D.       • prochlorperazine (COMPAZINE) injection 5-10 mg  5-10 mg Q4HRS PRN Chris Hernandez M.D.       • acetaminophen (TYLENOL) tablet 650 mg  650 mg Q6HRS PRN Chris Hernandez M.D.   650 mg at 04/18/17 0412   • nicotine polacrilex (NICORETTE) 2 MG piece 2 mg  2 mg Q HOUR PRN Chris Hernandez M.D.       • Respiratory Care per Protocol   Continuous RT Joao Jackson M.D.       • lactulose 20 GM/30ML solution 30 mL  30 mL Q24HRS PRN Joao Jackson M.D.       • bisacodyl (DULCOLAX) suppository 10 mg  10 mg Q24HRS PRN Joao Jackson M.D.       • fleet enema 133 mL  1 Each Once PRN Joao Jackson M.D.       • ipratropium-albuterol (DUONEB) nebulizer solution 3 mL  3 mL Q2HRS PRN (RT)  Joao Jackson M.D.         Last reviewed on 4/16/2017  2:37 PM by Sara Rodriguez, Pharmacy Int    Quality  Measures:  Labs reviewed, Medications reviewed and Radiology images reviewed  Mckenzie catheter: Critically Ill - Requiring Accurate Measurement of Urinary Output  Central line in place: Need for access    DVT Prophylaxis: Enoxaparin (Lovenox)  DVT prophylaxis - mechanical: SCDs  Ulcer prophylaxis: Yes            Assessment and Plan:    S/P VDRF - intubated 4/15, liberated 4/17  Acute metabolic encephalopathy   - head CT negative   - presumed drug overdose (Seroquel)   - resolved   - ammonia normal  Positive urine drug screen for amphetamines  Psychiatric disorders - extensive history   - psychiatry on board   - Lithium and Seroquel    OK to transfer to medical bed.  [facility] will sign off on transfer.    Discussed with RN, RT, Team     I, Marsha Martínez (Scribe), am scribing for, and in the presence of, Vicente Murphy M.D.    Electronically signed by: Marsha Martínez (Scribe), 4/21/2017    IVicente M.D. personally performed the services described in this documentation, as scribed by  Marsha Martínez in my presence, and it is both accurate and complete.

## 2017-04-21 NOTE — DISCHARGE PLANNING
Legal hold discontinued per Court.  Signed by District  Avila Martinez.  20th day of April 2017.  Copy to chart.  Notified [facility] and [facility].  Patient has insurance coverage in [place] for her DC prescriptions.     Based on examination by court it was concluded that patient does not suffer from a mental illness and does not meet criteria for Involuntary Civil Commitment.     Patient has DC orders for home with family.

## 2017-04-21 NOTE — CARE PLAN
Problem: Safety  Goal: Will remain free from injury  Intervention: Provide assistance with mobility  Up adlib, POC discussed      Problem: Discharge Barriers/Planning  Goal: Patient’s continuum of care needs will be met  Intervention: Assess potential discharge barriers on admission and throughout hospital stay  Parents to  at 1300, SW looking for court papers, needs prescription for lithium and seroquel for dc

## 2017-04-21 NOTE — CARE PLAN
Problem: Bowel/Gastric:  Goal: Normal bowel function is maintained or improved  Intervention: Educate patient and significant other/support system about signs and symptoms of constipation and interventions to implement  Pt refused interventions to help her have a BM.      Problem: Mobility  Goal: Risk for activity intolerance will decrease  Intervention: Assess and monitor signs of activity intolerance  Pt walked around the unit and tolerated well.

## 2017-05-04 NOTE — ADDENDUM NOTE
Encounter addended by: Soni Bhandari, RRT on: 5/4/2017 11:19 AM<BR>     Documentation filed: Inpatient Document Flowsheet

## 2017-05-23 ENCOUNTER — HOSPITAL ENCOUNTER (OUTPATIENT)
Dept: RADIOLOGY | Facility: MEDICAL CENTER | Age: 27
End: 2017-05-23

## 2017-05-23 ENCOUNTER — APPOINTMENT (OUTPATIENT)
Dept: RADIOLOGY | Facility: MEDICAL CENTER | Age: 27
DRG: 917 | End: 2017-05-23
Attending: INTERNAL MEDICINE
Payer: COMMERCIAL

## 2017-05-23 ENCOUNTER — HOSPITAL ENCOUNTER (INPATIENT)
Facility: MEDICAL CENTER | Age: 27
LOS: 7 days | DRG: 917 | End: 2017-05-30
Attending: EMERGENCY MEDICINE | Admitting: INTERNAL MEDICINE
Payer: COMMERCIAL

## 2017-05-23 ENCOUNTER — RESOLUTE PROFESSIONAL BILLING HOSPITAL PROF FEE (OUTPATIENT)
Dept: OTHER | Facility: MEDICAL CENTER | Age: 27
End: 2017-05-23
Payer: COMMERCIAL

## 2017-05-23 ENCOUNTER — APPOINTMENT (OUTPATIENT)
Dept: RADIOLOGY | Facility: MEDICAL CENTER | Age: 27
DRG: 917 | End: 2017-05-23
Attending: EMERGENCY MEDICINE
Payer: COMMERCIAL

## 2017-05-23 DIAGNOSIS — J96.00 ACUTE RESPIRATORY FAILURE, UNSPECIFIED WHETHER WITH HYPOXIA OR HYPERCAPNIA (HCC): ICD-10-CM

## 2017-05-23 DIAGNOSIS — T50.904A DRUG OVERDOSE, UNDETERMINED INTENT, INITIAL ENCOUNTER: ICD-10-CM

## 2017-05-23 PROBLEM — T50.901A: Status: ACTIVE | Noted: 2017-05-23

## 2017-05-23 LAB
ALBUMIN SERPL BCP-MCNC: 3.3 G/DL (ref 3.2–4.9)
ALBUMIN/GLOB SERPL: 1.3 G/DL
ALP SERPL-CCNC: 62 U/L (ref 30–99)
ALT SERPL-CCNC: 7 U/L (ref 2–50)
ANION GAP SERPL CALC-SCNC: 4 MMOL/L (ref 0–11.9)
ANION GAP SERPL CALC-SCNC: 6 MMOL/L (ref 0–11.9)
APAP SERPL-MCNC: <10 UG/ML (ref 10–30)
APPEARANCE UR: CLEAR
APTT PPP: 20.2 SEC (ref 24.7–36)
AST SERPL-CCNC: 12 U/L (ref 12–45)
B-OH-BUTYR SERPL-MCNC: 1.43 MMOL/L (ref 0.02–0.27)
BASE EXCESS BLDA CALC-SCNC: -5 MMOL/L (ref -4–3)
BASOPHILS # BLD AUTO: 0.6 % (ref 0–1.8)
BASOPHILS # BLD: 0.03 K/UL (ref 0–0.12)
BILIRUB SERPL-MCNC: 1 MG/DL (ref 0.1–1.5)
BILIRUB UR QL STRIP.AUTO: NEGATIVE
BNP SERPL-MCNC: 7 PG/ML (ref 0–100)
BODY TEMPERATURE: ABNORMAL DEGREES
BUN SERPL-MCNC: 5 MG/DL (ref 8–22)
BUN SERPL-MCNC: 7 MG/DL (ref 8–22)
CALCIUM SERPL-MCNC: 8.5 MG/DL (ref 8.5–10.5)
CALCIUM SERPL-MCNC: 9 MG/DL (ref 8.5–10.5)
CHLORIDE SERPL-SCNC: 116 MMOL/L (ref 96–112)
CHLORIDE SERPL-SCNC: 118 MMOL/L (ref 96–112)
CK SERPL-CCNC: 47 U/L (ref 0–154)
CK SERPL-CCNC: 53 U/L (ref 0–154)
CO2 BLDA-SCNC: 22 MMOL/L (ref 20–33)
CO2 SERPL-SCNC: 20 MMOL/L (ref 20–33)
CO2 SERPL-SCNC: 21 MMOL/L (ref 20–33)
COLOR UR: COLORLESS
CREAT SERPL-MCNC: 0.56 MG/DL (ref 0.5–1.4)
CREAT SERPL-MCNC: 0.59 MG/DL (ref 0.5–1.4)
EKG IMPRESSION: NORMAL
EOSINOPHIL # BLD AUTO: 0.13 K/UL (ref 0–0.51)
EOSINOPHIL NFR BLD: 2.4 % (ref 0–6.9)
ERYTHROCYTE [DISTWIDTH] IN BLOOD BY AUTOMATED COUNT: 51.2 FL (ref 35.9–50)
ETHANOL BLD-MCNC: 0 G/DL
GFR SERPL CREATININE-BSD FRML MDRD: >60 ML/MIN/1.73 M 2
GFR SERPL CREATININE-BSD FRML MDRD: >60 ML/MIN/1.73 M 2
GLOBULIN SER CALC-MCNC: 2.6 G/DL (ref 1.9–3.5)
GLUCOSE BLD-MCNC: 103 MG/DL (ref 65–99)
GLUCOSE BLD-MCNC: 63 MG/DL (ref 65–99)
GLUCOSE BLD-MCNC: 74 MG/DL (ref 65–99)
GLUCOSE BLD-MCNC: 91 MG/DL (ref 65–99)
GLUCOSE BLD-MCNC: 92 MG/DL (ref 65–99)
GLUCOSE SERPL-MCNC: 74 MG/DL (ref 65–99)
GLUCOSE SERPL-MCNC: 99 MG/DL (ref 65–99)
GLUCOSE UR STRIP.AUTO-MCNC: NEGATIVE MG/DL
HCG SERPL QL: NEGATIVE
HCO3 BLDA-SCNC: 21.2 MMOL/L (ref 17–25)
HCT VFR BLD AUTO: 32.1 % (ref 37–47)
HGB BLD-MCNC: 10.4 G/DL (ref 12–16)
IMM GRANULOCYTES # BLD AUTO: 0.01 K/UL (ref 0–0.11)
IMM GRANULOCYTES NFR BLD AUTO: 0.2 % (ref 0–0.9)
INR PPP: 1.18 (ref 0.87–1.13)
KETONES UR STRIP.AUTO-MCNC: 10 MG/DL
LACTATE BLD-SCNC: 0.8 MMOL/L (ref 0.5–2)
LACTATE BLD-SCNC: 1.3 MMOL/L (ref 0.5–2)
LEUKOCYTE ESTERASE UR QL STRIP.AUTO: NEGATIVE
LITHIUM SERPL-MCNC: 1.1 MMOL/L (ref 0.6–1.2)
LITHIUM SERPL-MCNC: 1.5 MMOL/L (ref 0.6–1.2)
LYMPHOCYTES # BLD AUTO: 1.68 K/UL (ref 1–4.8)
LYMPHOCYTES NFR BLD: 31.3 % (ref 22–41)
MAGNESIUM SERPL-MCNC: 1.9 MG/DL (ref 1.5–2.5)
MCH RBC QN AUTO: 29.8 PG (ref 27–33)
MCHC RBC AUTO-ENTMCNC: 32.4 G/DL (ref 33.6–35)
MCV RBC AUTO: 92 FL (ref 81.4–97.8)
MICRO URNS: ABNORMAL
MONOCYTES # BLD AUTO: 0.54 K/UL (ref 0–0.85)
MONOCYTES NFR BLD AUTO: 10.1 % (ref 0–13.4)
NEUTROPHILS # BLD AUTO: 2.97 K/UL (ref 2–7.15)
NEUTROPHILS NFR BLD: 55.4 % (ref 44–72)
NITRITE UR QL STRIP.AUTO: NEGATIVE
NRBC # BLD AUTO: 0 K/UL
NRBC BLD AUTO-RTO: 0 /100 WBC
O2/TOTAL GAS SETTING VFR VENT: 40 %
PCO2 BLDA: 41.1 MMHG (ref 26–37)
PCO2 TEMP ADJ BLDA: 40.5 MMHG (ref 26–37)
PH BLDA: 7.32 [PH] (ref 7.4–7.5)
PH TEMP ADJ BLDA: 7.33 [PH] (ref 7.4–7.5)
PH UR STRIP.AUTO: 7.5 [PH]
PHOSPHATE SERPL-MCNC: 3.4 MG/DL (ref 2.5–4.5)
PLATELET # BLD AUTO: 223 K/UL (ref 164–446)
PMV BLD AUTO: 9.5 FL (ref 9–12.9)
PO2 BLDA: 138 MMHG (ref 64–87)
PO2 TEMP ADJ BLDA: 136 MMHG (ref 64–87)
POTASSIUM SERPL-SCNC: 3.5 MMOL/L (ref 3.6–5.5)
POTASSIUM SERPL-SCNC: 3.6 MMOL/L (ref 3.6–5.5)
PROT SERPL-MCNC: 5.9 G/DL (ref 6–8.2)
PROT UR QL STRIP: NEGATIVE MG/DL
PROTHROMBIN TIME: 15.4 SEC (ref 12–14.6)
RBC # BLD AUTO: 3.49 M/UL (ref 4.2–5.4)
RBC UR QL AUTO: NEGATIVE
SALICYLATES SERPL-MCNC: 0 MG/DL (ref 15–25)
SAO2 % BLDA: 99 % (ref 93–99)
SODIUM SERPL-SCNC: 142 MMOL/L (ref 135–145)
SODIUM SERPL-SCNC: 143 MMOL/L (ref 135–145)
SP GR UR STRIP.AUTO: 1.01
SPECIMEN DRAWN FROM PATIENT: ABNORMAL
TRIGL SERPL-MCNC: 48 MG/DL (ref 0–149)
TROPONIN I SERPL-MCNC: <0.01 NG/ML (ref 0–0.04)
WBC # BLD AUTO: 5.4 K/UL (ref 4.8–10.8)

## 2017-05-23 PROCEDURE — 93005 ELECTROCARDIOGRAM TRACING: CPT | Performed by: INTERNAL MEDICINE

## 2017-05-23 PROCEDURE — 84206 ASSAY OF PROINSULIN: CPT

## 2017-05-23 PROCEDURE — 5A1945Z RESPIRATORY VENTILATION, 24-96 CONSECUTIVE HOURS: ICD-10-PCS | Performed by: INTERNAL MEDICINE

## 2017-05-23 PROCEDURE — 81003 URINALYSIS AUTO W/O SCOPE: CPT

## 2017-05-23 PROCEDURE — 304561 HCHG STAT O2

## 2017-05-23 PROCEDURE — 84681 ASSAY OF C-PEPTIDE: CPT

## 2017-05-23 PROCEDURE — 36600 WITHDRAWAL OF ARTERIAL BLOOD: CPT

## 2017-05-23 PROCEDURE — G0480 DRUG TEST DEF 1-7 CLASSES: HCPCS

## 2017-05-23 PROCEDURE — 82803 BLOOD GASES ANY COMBINATION: CPT

## 2017-05-23 PROCEDURE — 80053 COMPREHEN METABOLIC PANEL: CPT

## 2017-05-23 PROCEDURE — 02HV33Z INSERTION OF INFUSION DEVICE INTO SUPERIOR VENA CAVA, PERCUTANEOUS APPROACH: ICD-10-PCS | Performed by: INTERNAL MEDICINE

## 2017-05-23 PROCEDURE — 93010 ELECTROCARDIOGRAM REPORT: CPT | Performed by: INTERNAL MEDICINE

## 2017-05-23 PROCEDURE — 83605 ASSAY OF LACTIC ACID: CPT

## 2017-05-23 PROCEDURE — 36415 COLL VENOUS BLD VENIPUNCTURE: CPT

## 2017-05-23 PROCEDURE — C1751 CATH, INF, PER/CENT/MIDLINE: HCPCS | Performed by: EMERGENCY MEDICINE

## 2017-05-23 PROCEDURE — 85610 PROTHROMBIN TIME: CPT

## 2017-05-23 PROCEDURE — 71010 DX-CHEST-LIMITED (1 VIEW): CPT

## 2017-05-23 PROCEDURE — 770022 HCHG ROOM/CARE - ICU (200)

## 2017-05-23 PROCEDURE — 700105 HCHG RX REV CODE 258: Performed by: INTERNAL MEDICINE

## 2017-05-23 PROCEDURE — B548ZZA ULTRASONOGRAPHY OF SUPERIOR VENA CAVA, GUIDANCE: ICD-10-PCS | Performed by: INTERNAL MEDICINE

## 2017-05-23 PROCEDURE — 82962 GLUCOSE BLOOD TEST: CPT

## 2017-05-23 PROCEDURE — 83880 ASSAY OF NATRIURETIC PEPTIDE: CPT

## 2017-05-23 PROCEDURE — 700105 HCHG RX REV CODE 258: Performed by: HOSPITALIST

## 2017-05-23 PROCEDURE — 302135 SEQUENTIAL COMPRESSION MACHINE: Performed by: HOSPITALIST

## 2017-05-23 PROCEDURE — 302128 INFUSION PUMP: Performed by: EMERGENCY MEDICINE

## 2017-05-23 PROCEDURE — 83735 ASSAY OF MAGNESIUM: CPT

## 2017-05-23 PROCEDURE — 36556 INSERT NON-TUNNEL CV CATH: CPT

## 2017-05-23 PROCEDURE — 94760 N-INVAS EAR/PLS OXIMETRY 1: CPT

## 2017-05-23 PROCEDURE — 99291 CRITICAL CARE FIRST HOUR: CPT

## 2017-05-23 PROCEDURE — 304538 HCHG NG TUBE

## 2017-05-23 PROCEDURE — 99291 CRITICAL CARE FIRST HOUR: CPT | Mod: GC | Performed by: INTERNAL MEDICINE

## 2017-05-23 PROCEDURE — 80178 ASSAY OF LITHIUM: CPT | Mod: 91

## 2017-05-23 PROCEDURE — 85025 COMPLETE CBC W/AUTO DIFF WBC: CPT

## 2017-05-23 PROCEDURE — 93005 ELECTROCARDIOGRAM TRACING: CPT | Performed by: EMERGENCY MEDICINE

## 2017-05-23 PROCEDURE — 83525 ASSAY OF INSULIN: CPT

## 2017-05-23 PROCEDURE — 96375 TX/PRO/DX INJ NEW DRUG ADDON: CPT

## 2017-05-23 PROCEDURE — 96361 HYDRATE IV INFUSION ADD-ON: CPT

## 2017-05-23 PROCEDURE — 96365 THER/PROPH/DIAG IV INF INIT: CPT

## 2017-05-23 PROCEDURE — 84703 CHORIONIC GONADOTROPIN ASSAY: CPT

## 2017-05-23 PROCEDURE — 700111 HCHG RX REV CODE 636 W/ 250 OVERRIDE (IP): Performed by: EMERGENCY MEDICINE

## 2017-05-23 PROCEDURE — 85730 THROMBOPLASTIN TIME PARTIAL: CPT

## 2017-05-23 PROCEDURE — 82550 ASSAY OF CK (CPK): CPT

## 2017-05-23 PROCEDURE — 700101 HCHG RX REV CODE 250: Performed by: HOSPITALIST

## 2017-05-23 PROCEDURE — 84100 ASSAY OF PHOSPHORUS: CPT

## 2017-05-23 PROCEDURE — 94002 VENT MGMT INPAT INIT DAY: CPT

## 2017-05-23 PROCEDURE — 82010 KETONE BODYS QUAN: CPT

## 2017-05-23 PROCEDURE — 700111 HCHG RX REV CODE 636 W/ 250 OVERRIDE (IP): Performed by: INTERNAL MEDICINE

## 2017-05-23 PROCEDURE — 700102 HCHG RX REV CODE 250 W/ 637 OVERRIDE(OP): Performed by: INTERNAL MEDICINE

## 2017-05-23 PROCEDURE — 700101 HCHG RX REV CODE 250: Performed by: INTERNAL MEDICINE

## 2017-05-23 PROCEDURE — 84484 ASSAY OF TROPONIN QUANT: CPT

## 2017-05-23 PROCEDURE — 84478 ASSAY OF TRIGLYCERIDES: CPT

## 2017-05-23 PROCEDURE — 96366 THER/PROPH/DIAG IV INF ADDON: CPT

## 2017-05-23 PROCEDURE — 71010 DX-CHEST-PORTABLE (1 VIEW): CPT

## 2017-05-23 PROCEDURE — A9270 NON-COVERED ITEM OR SERVICE: HCPCS | Performed by: INTERNAL MEDICINE

## 2017-05-23 PROCEDURE — 700111 HCHG RX REV CODE 636 W/ 250 OVERRIDE (IP): Performed by: HOSPITALIST

## 2017-05-23 PROCEDURE — 80307 DRUG TEST PRSMV CHEM ANLYZR: CPT

## 2017-05-23 PROCEDURE — 80048 BASIC METABOLIC PNL TOTAL CA: CPT

## 2017-05-23 RX ORDER — DEXTROSE MONOHYDRATE, SODIUM CHLORIDE, AND POTASSIUM CHLORIDE 50; 1.49; 4.5 G/1000ML; G/1000ML; G/1000ML
INJECTION, SOLUTION INTRAVENOUS CONTINUOUS
Status: DISCONTINUED | OUTPATIENT
Start: 2017-05-23 | End: 2017-05-26

## 2017-05-23 RX ORDER — SODIUM CHLORIDE 9 MG/ML
1000 INJECTION, SOLUTION INTRAVENOUS ONCE
Status: COMPLETED | OUTPATIENT
Start: 2017-05-23 | End: 2017-05-23

## 2017-05-23 RX ORDER — IPRATROPIUM BROMIDE AND ALBUTEROL SULFATE 2.5; .5 MG/3ML; MG/3ML
3 SOLUTION RESPIRATORY (INHALATION)
Status: DISCONTINUED | OUTPATIENT
Start: 2017-05-23 | End: 2017-05-30 | Stop reason: HOSPADM

## 2017-05-23 RX ORDER — SODIUM CHLORIDE 9 MG/ML
INJECTION, SOLUTION INTRAVENOUS CONTINUOUS
Status: DISCONTINUED | OUTPATIENT
Start: 2017-05-23 | End: 2017-05-23

## 2017-05-23 RX ORDER — FAMOTIDINE 20 MG/1
20 TABLET, FILM COATED ORAL EVERY 12 HOURS
Status: DISCONTINUED | OUTPATIENT
Start: 2017-05-23 | End: 2017-05-26

## 2017-05-23 RX ORDER — MIDAZOLAM HYDROCHLORIDE 1 MG/ML
1-5 INJECTION INTRAMUSCULAR; INTRAVENOUS
Status: DISCONTINUED | OUTPATIENT
Start: 2017-05-23 | End: 2017-05-24

## 2017-05-23 RX ORDER — DIAZEPAM 10 MG/1
10 TABLET ORAL EVERY 8 HOURS PRN
Status: ON HOLD | COMMUNITY
End: 2018-11-21

## 2017-05-23 RX ORDER — DEXTROSE MONOHYDRATE 25 G/50ML
25 INJECTION, SOLUTION INTRAVENOUS
Status: DISCONTINUED | OUTPATIENT
Start: 2017-05-23 | End: 2017-05-30 | Stop reason: HOSPADM

## 2017-05-23 RX ORDER — HYDROMORPHONE HYDROCHLORIDE 2 MG/ML
0.5 INJECTION, SOLUTION INTRAMUSCULAR; INTRAVENOUS; SUBCUTANEOUS ONCE
Status: COMPLETED | OUTPATIENT
Start: 2017-05-23 | End: 2017-05-23

## 2017-05-23 RX ORDER — BISACODYL 10 MG
10 SUPPOSITORY, RECTAL RECTAL
Status: DISCONTINUED | OUTPATIENT
Start: 2017-05-24 | End: 2017-05-30 | Stop reason: HOSPADM

## 2017-05-23 RX ORDER — CHLORHEXIDINE GLUCONATE ORAL RINSE 1.2 MG/ML
15 SOLUTION DENTAL 2 TIMES DAILY
Status: DISCONTINUED | OUTPATIENT
Start: 2017-05-23 | End: 2017-05-25

## 2017-05-23 RX ORDER — HALOPERIDOL 5 MG/1
5 TABLET ORAL 4 TIMES DAILY
COMMUNITY
End: 2017-05-23

## 2017-05-23 RX ORDER — POTASSIUM CHLORIDE 29.8 MG/ML
40 INJECTION INTRAVENOUS ONCE
Status: COMPLETED | OUTPATIENT
Start: 2017-05-23 | End: 2017-05-24

## 2017-05-23 RX ORDER — POLYETHYLENE GLYCOL 3350 17 G/17G
1 POWDER, FOR SOLUTION ORAL
Status: DISCONTINUED | OUTPATIENT
Start: 2017-05-24 | End: 2017-05-30 | Stop reason: HOSPADM

## 2017-05-23 RX ORDER — HYDROMORPHONE HYDROCHLORIDE 2 MG/ML
0.5 INJECTION, SOLUTION INTRAMUSCULAR; INTRAVENOUS; SUBCUTANEOUS
Status: DISCONTINUED | OUTPATIENT
Start: 2017-05-23 | End: 2017-05-23

## 2017-05-23 RX ORDER — AMOXICILLIN 250 MG
2 CAPSULE ORAL 2 TIMES DAILY
Status: DISCONTINUED | OUTPATIENT
Start: 2017-05-24 | End: 2017-05-30 | Stop reason: HOSPADM

## 2017-05-23 RX ORDER — QUETIAPINE FUMARATE 300 MG/1
300 TABLET, FILM COATED ORAL
Status: ON HOLD | COMMUNITY
End: 2018-11-21

## 2017-05-23 RX ORDER — LITHIUM CARBONATE 150 MG/1
150 CAPSULE ORAL 2 TIMES DAILY
Status: ON HOLD | COMMUNITY
End: 2018-11-21

## 2017-05-23 RX ORDER — IPRATROPIUM BROMIDE AND ALBUTEROL SULFATE 2.5; .5 MG/3ML; MG/3ML
3 SOLUTION RESPIRATORY (INHALATION)
Status: DISCONTINUED | OUTPATIENT
Start: 2017-05-23 | End: 2017-05-25

## 2017-05-23 RX ORDER — LIDOCAINE HYDROCHLORIDE 10 MG/ML
1-2 INJECTION, SOLUTION INFILTRATION; PERINEURAL
Status: DISCONTINUED | OUTPATIENT
Start: 2017-05-23 | End: 2017-05-26

## 2017-05-23 RX ORDER — HEPARIN SODIUM 5000 [USP'U]/ML
5000 INJECTION, SOLUTION INTRAVENOUS; SUBCUTANEOUS EVERY 8 HOURS
Status: DISCONTINUED | OUTPATIENT
Start: 2017-05-24 | End: 2017-05-24

## 2017-05-23 RX ADMIN — POTASSIUM CHLORIDE, DEXTROSE MONOHYDRATE AND SODIUM CHLORIDE: 150; 5; 450 INJECTION, SOLUTION INTRAVENOUS at 21:41

## 2017-05-23 RX ADMIN — DEXTROSE MONOHYDRATE 25 ML: 25 INJECTION, SOLUTION INTRAVENOUS at 20:37

## 2017-05-23 RX ADMIN — POTASSIUM CHLORIDE 40 MEQ: 29.8 INJECTION, SOLUTION INTRAVENOUS at 21:43

## 2017-05-23 RX ADMIN — SODIUM CHLORIDE 1000 ML: 9 INJECTION, SOLUTION INTRAVENOUS at 15:57

## 2017-05-23 RX ADMIN — FAMOTIDINE 20 MG: 10 INJECTION, SOLUTION INTRAVENOUS at 21:40

## 2017-05-23 RX ADMIN — PROPOFOL 5 MCG/KG/MIN: 10 INJECTION, EMULSION INTRAVENOUS at 14:07

## 2017-05-23 RX ADMIN — CHLORHEXIDINE GLUCONATE 15 ML: 1.2 RINSE ORAL at 21:40

## 2017-05-23 RX ADMIN — DEXTROSE MONOHYDRATE 25 ML: 25 INJECTION, SOLUTION INTRAVENOUS at 18:58

## 2017-05-23 RX ADMIN — SODIUM CHLORIDE 150 ML: 9 INJECTION, SOLUTION INTRAVENOUS at 15:58

## 2017-05-23 RX ADMIN — HYDROMORPHONE HYDROCHLORIDE 0.5 MG: 2 INJECTION INTRAMUSCULAR; INTRAVENOUS; SUBCUTANEOUS at 14:19

## 2017-05-23 RX ADMIN — THIAMINE HYDROCHLORIDE 100 MG: 100 INJECTION, SOLUTION INTRAMUSCULAR; INTRAVENOUS at 21:41

## 2017-05-23 RX ADMIN — PROPOFOL 10 MCG/KG/MIN: 10 INJECTION, EMULSION INTRAVENOUS at 15:58

## 2017-05-23 RX ADMIN — PROPOFOL 80 MCG/KG/MIN: 10 INJECTION, EMULSION INTRAVENOUS at 22:04

## 2017-05-23 RX ADMIN — IPRATROPIUM BROMIDE AND ALBUTEROL SULFATE 3 ML: .5; 3 SOLUTION RESPIRATORY (INHALATION) at 19:17

## 2017-05-23 ASSESSMENT — LIFESTYLE VARIABLES
DO YOU DRINK ALCOHOL: NO
REASON UNABLE TO ASSESS: INTUBATED.

## 2017-05-23 ASSESSMENT — PAIN SCALES - GENERAL: PAINLEVEL_OUTOF10: ASSUMED PAIN PRESENT

## 2017-05-23 NOTE — ED PROVIDER NOTES
CHIEF COMPLAINT  Chief Complaint   Patient presents with   • Drug Overdose     Patient to ed via care flight. Patient is a transfer from another hospital. Patient was found on the street in [place] with altered loc. Patient was taken to hospital and intubated to protect her airway. Patient arrives intubated and sedated.        HPI (1)  Mago Bowman is a 26 y.o. female brought in by care flight for altered level of consciousness secondary to likely medication/polysubstance overdose. Per emergency personnel, the patient was found down outside of medical clinic where she was noted to be somnolent and intermittently unresponsive. They report intermittent lateral gaze palsy, no tremor. Patient was given 1 mg of Narcan in the field, however her somnolence increased during transit and she was intubated and given etomidate, succinylcholine, rocuronium, and Versed. On arrival to the emergency department she is intubated, sedated, not responding to commands however she does move all extremities.    Home medications include Seroquel, haloperidol, diazepam, lithium. Urine drug screen outside facility was positive for benzodiazepines and amphetamines.    REVIEW OF SYSTEMS(1)  Unable to collect review of systems due to altered level of consciousness, intubation/sedation     PAST MEDICAL HISTORY(PFS1)  History reviewed. No pertinent past medical history.    SOCIAL HISTORY  Social History   Substance Use Topics   • Smoking status: Unknown If Ever Smoked   • Smokeless tobacco: None   • Alcohol Use: None   a  History   Drug Use Not on file       SURGICAL HISTORY  History reviewed. No pertinent past surgical history.    CURRENT MEDICATIONS  Home Medications     Reviewed by Edie Duque R.N. (Registered Nurse) on 05/23/17 at 1402  Med List Status: Unable to Obtain    Medication Last Dose Status    diazepam (VALIUM) 10 MG tablet  Active    lithium CR (ESKALITH CR) 450 MG Tab CR  Active    quetiapine (SEROQUEL) 200 MG Tab  Active  "               ALLERGIES  Allergies   Allergen Reactions   • Ativan Anxiety   • Sulfa Drugs Rash     unknown       PHYSICAL EXAM (2)  VITAL SIGNS: BP 94/56 mmHg  Pulse 92  Resp 17  Ht 1.651 m (5' 5\")  Wt 60 kg (132 lb 4.4 oz)  BMI 22.01 kg/m2  SpO2 97% Reviewed and increasingly hypotensive  Constitutional: Intubated, sedated  HENMT: Endotracheal and orogastric tubes in place, atraumatic  Eyes: PERRL, Scant discharge from right eye  Neck:  Supple, No stridor, no JVD. Left internal jugular central line in place  Cardiovascular:  Normal heart rate, Normal rhythm, No murmurs, No rubs, No gallops.   Lungs:  respirator dependent, intubated. breath sounds clear to auscultation bilaterally,  no rales, no rhonchi, no wheezing.   Abdomen: Soft, No masses, No hepatosplenomegaly.  Extremities: intact distal pulses, no cyanosis, clubbing or edema.  Skin: Extensive self-harm wounds on upper and lower extremities bilaterally  Neurologic: Sedated, moving all 4 extremities, does not follow commands         DIFFERENTIAL DIAGNOSIS:  Differential diagnosis includes but is not limited to substance overdose (alcohol, prescription medications, amphetamine), cerebrovascular accident, less likely meningitis/encephalitis    RADIOLOGY/PROCEDURES  DX-CHEST-PORTABLE (1 VIEW)    (Results Pending)       LABORATORY: Reviewed as below.  Results for orders placed or performed during the hospital encounter of 05/23/17   CBC w/ Differential   Result Value Ref Range    WBC 5.4 4.8 - 10.8 K/uL    RBC 3.49 (L) 4.20 - 5.40 M/uL    Hemoglobin 10.4 (L) 12.0 - 16.0 g/dL    Hematocrit 32.1 (L) 37.0 - 47.0 %    MCV 92.0 81.4 - 97.8 fL    MCH 29.8 27.0 - 33.0 pg    MCHC 32.4 (L) 33.6 - 35.0 g/dL    RDW 51.2 (H) 35.9 - 50.0 fL    Platelet Count 223 164 - 446 K/uL    MPV 9.5 9.0 - 12.9 fL    Neutrophils-Polys 55.40 44.00 - 72.00 %    Lymphocytes 31.30 22.00 - 41.00 %    Monocytes 10.10 0.00 - 13.40 %    Eosinophils 2.40 0.00 - 6.90 %    Basophils 0.60 0.00 " - 1.80 %    Immature Granulocytes 0.20 0.00 - 0.90 %    Nucleated RBC 0.00 /100 WBC    Neutrophils (Absolute) 2.97 2.00 - 7.15 K/uL    Lymphs (Absolute) 1.68 1.00 - 4.80 K/uL    Monos (Absolute) 0.54 0.00 - 0.85 K/uL    Eos (Absolute) 0.13 0.00 - 0.51 K/uL    Baso (Absolute) 0.03 0.00 - 0.12 K/uL    Immature Granulocytes (abs) 0.01 0.00 - 0.11 K/uL    NRBC (Absolute) 0.00 K/uL   Complete Metabolic Panel (CMP)   Result Value Ref Range    Sodium 143 135 - 145 mmol/L    Potassium 3.6 3.6 - 5.5 mmol/L    Chloride 116 (H) 96 - 112 mmol/L    Co2 21 20 - 33 mmol/L    Anion Gap 6.0 0.0 - 11.9    Glucose 99 65 - 99 mg/dL    Bun 7 (L) 8 - 22 mg/dL    Creatinine 0.59 0.50 - 1.40 mg/dL    Calcium 9.0 8.5 - 10.5 mg/dL    AST(SGOT) 12 12 - 45 U/L    ALT(SGPT) 7 2 - 50 U/L    Alkaline Phosphatase 62 30 - 99 U/L    Total Bilirubin 1.0 0.1 - 1.5 mg/dL    Albumin 3.3 3.2 - 4.9 g/dL    Total Protein 5.9 (L) 6.0 - 8.2 g/dL    Globulin 2.6 1.9 - 3.5 g/dL    A-G Ratio 1.3 g/dL   Troponin STAT   Result Value Ref Range    Troponin I <0.01 0.00 - 0.04 ng/mL   APTT   Result Value Ref Range    APTT 20.2 (L) 24.7 - 36.0 sec   PT/INR   Result Value Ref Range    PT 15.4 (H) 12.0 - 14.6 sec    INR 1.18 (H) 0.87 - 1.13   DIAGNOSTIC ALCOHOL   Result Value Ref Range    Diagnostic Alcohol 0.00 0.00 g/dL   Phosphorus   Result Value Ref Range    Phosphorus 3.4 2.5 - 4.5 mg/dL   Magnesium   Result Value Ref Range    Magnesium 1.9 1.5 - 2.5 mg/dL   Btype Natriuretic Peptide   Result Value Ref Range    B Natriuretic Peptide 7 0 - 100 pg/mL   LITHIUM   Result Value Ref Range    Lithium 1.5 (H) 0.6 - 1.2 mmol/L   LACTIC ACID   Result Value Ref Range    Lactic Acid 1.3 0.5 - 2.0 mmol/L   ACETAMINOPHEN   Result Value Ref Range    Acetaminophen -Tylenol <10 10 - 30 ug/mL   SALICYLATE   Result Value Ref Range    Salicylates, Quant. 0 (L) 15 - 25 mg/dL   TRIGLYCERIDE   Result Value Ref Range    Triglycerides 48 0 - 149 mg/dL   ESTIMATED GFR   Result Value Ref  Range    GFR If African American >60 >60 mL/min/1.73 m 2    GFR If Non African American >60 >60 mL/min/1.73 m 2   EKG (NOW)   Result Value Ref Range    Report       [facility]    Test Date:  2017  Pt Name:    SYL BOONE               Department: ER  MRN:        [id number]                      Room:       [facility]  Gender:     F                            Technician: [id number]  :        [id number]                   Requested By:KANDY VILLAREAL  Order #:    [id number]                    Reading MD: KANDY VILLAREAL MD    Measurements  Intervals                                Axis  Rate:       97                           P:  CO:                                      QRS:        60  QRSD:       84                           T:          -65  QT:         368  QTc:        468    Interpretive Statements  NSR  NONSPECIFIC T ABNORMALITIES, INFERIOR LEADS  Compared to ECG 2017 10:46:12  T-wave abnormality now present  Sinus tachycardia no longer present    Electronically Signed On 2017 14:35:28 PDT by KANDY VILLAREAL MD         INTERVENTIONS:  Medications   propofol (DIPRIVAN) injection (not administered)   Respiratory Care per Protocol (not administered)   HYDROmorphone (DILAUDID) injection 0.5 mg (not administered)     And   HYDROmorphone (DILAUDID) injection 0.5 mg (not administered)   midazolam (VERSED) injection 1-5 mg (not administered)   propofol (DIPRIVAN) infusion (not administered)     Response: Adequate sedation    COURSE & MEDICAL DECISION MAKING  2:06 PM - patient brought to read 7 by care flight team records from outside facility are notable for vitals within normal limits, lab values including complete blood count, chemistry, LFTs notable for only minor disturbances. Urine drug screen performed at [facility] [place] positive for methamphetamine and benzodiazepine. Outside EKG performed at 11:39 AM is non-concerning    2:14 PM - patient seen and  examined at bedside. Moving all extremities, intubated/sedated. Labs ordered: Acetaminophen, salicylate, CBC, CMP, troponin, quite elation panel, diagnostic alcohol, phosphorus, magnesium, BNP, lithium level, ABG, lactate, triglycerides. Patient blood pressure decreased from measured 131/58 in the field to 95/54 on arrival. Orogastric tube to be placed by nursing. We will place central line for administration of fluid resuscitation and possible pressors for blood pressure support.    2:33 PM - orogastric tube now placed. EKG measured here, I interpreted as normal sinus rhythm, non-concerning for ischemia. Left internal jugular central line placed.     3:25 PM - patient has been seen by critical care (Vika). Discussed patient with [facility] resident (Chevy), will admit to intensive care.    This patient is critically ill and will be admitted to the intensive care unit.        CONDITION: Guarded    FINAL IMPRESSION  1. Acute respiratory failure, unspecified whether with hypoxia or hypercapnia (CMS-HCC)    2. Drug overdose, undetermined intent, initial encounter          Electronically signed by: Yassine Barrios, 5/23/2017 2:07 PM

## 2017-05-23 NOTE — H&P
[facility] Admitting History and Physical    Name Mago Bowman 1990   Age/Sex 26 y.o. female   MRN [id number]   Code Status Full     After 5PM or if no immediate response to page, please call for cross-coverage  Attending/Team: Dr Sandy/NOÉ Call [phone] to page      2nd Call - Day Sr. Resident (R2/R3):   Zoltan/Corinne       Chief Complaint:  Drug overdose    HPI:     26 y.o. female with PMH of amphetamine abuse /IVDA,  History of suicidal attempts. PTSD, schizoaffective disorder, was  brought in by care flight from [facility] for altered level of consciousness secondary to likely medication/polysubstance overdose. History obtained from MR, as pt is unresponsive. Per EMS, the patient was found down outside of medical clinic where she was noted to be somnolent and intermittently unresponsive, with intermittent lateral gaze palsy. She  was given 1 mg of Narcan probably with no good response,  And patient ended up being intubated to protect airways, using etomidate, succinylcholine, rocuronium, and Versed. She got 1 L NS outside and given 1 more L of NS at [facility] ED.  Home medications include Seroquel, haloperidol, diazepam, lithium. Urine drug screen outside facility was positive for benzodiazepines,meth and amphetamines. K 3.4, Mg 1.9, APAP and Naif NGT,  Apparently, poison control was contacted at outside facility and supportive care with IVF, cardio monitoring and repeat Li was recommended. She is sedated with propofol. VSs are stable. EKG: NSR, HR 82, QTc 402.  Ct head and CXR did not show acute abnormalities.   She was admitted here in ICU for the same 4/15-; she was also intubated at that time. Psychiatry was consulted and she was started on seroquel 200mg. Patient was discharged and was supposed to be going to [place] where her father lives. She was discharged on seroqeul 200 mg and lithium.  Details of ingestion unknown.No Family members available at bedside to  "talk about the patient.      Review of Systems   Unable to perform ROS            Past Medical History:   Past Medical History   Diagnosis Date   • Psychiatric disorder      RAD, schizo, bipolar.        Past Surgical History:  History reviewed. No pertinent past surgical history.    Current Outpatient Medications:  Home Medications     Reviewed by Edie Duque R.N. (Registered Nurse) on 05/23/17 at 1501  Med List Status: Complete    Medication Last Dose Status    diazepam (VALIUM) 10 MG tablet 5/19/2017 Active    haloperidol (HALDOL) 5 MG Tab 5/19/2017 Active    lithium CR (ESKALITH CR) 450 MG Tab CR 5/19/2017 Active    quetiapine (SEROQUEL) 200 MG Tab 5/19/2017 Active                Medication Allergy/Sensitivities:  Allergies   Allergen Reactions   • Ativan Anxiety   • Sulfa Drugs Rash     unknown         Family History:  History reviewed. No pertinent family history.    Social History:  Social History     Social History   • Marital Status: N/A     Spouse Name: N/A   • Number of Children: N/A   • Years of Education: N/A     Occupational History   • Not on file.     Social History Main Topics   • Smoking status: Unknown If Ever Smoked   • Smokeless tobacco: Not on file   • Alcohol Use: Yes   • Drug Use: Yes   • Sexual Activity: Not on file     Other Topics Concern   • Not on file     Social History Narrative   • No narrative on file     Living situation: unknown  PCP : unknown      Physical Exam     Filed Vitals:    05/23/17 1415 05/23/17 1430 05/23/17 1508 05/23/17 1521   BP: 102/56  107/59 104/53   Pulse: 79  88 85   Temp:  36.6 °C (97.9 °F)     Resp: 16  16 16   Height:       Weight:       SpO2: 100%  100% 100%     Body mass index is 22.01 kg/(m^2).  /53 mmHg  Pulse 85  Temp(Src) 36.6 °C (97.9 °F)  Resp 16  Ht 1.651 m (5' 5\")  Wt 60 kg (132 lb 4.4 oz)  BMI 22.01 kg/m2  SpO2 100%  O2 therapy: Pulse Oximetry: 100 %    Physical Exam   Constitutional: She is well-developed, well-nourished, and in " no distress. She has a sickly appearance. She is intubated.   HENT:   Head: Normocephalic and atraumatic.   Left IJV cath   Eyes: Pupils are equal, round, and reactive to light.   Neck: No JVD present.   Cardiovascular: Normal rate, regular rhythm and normal heart sounds.    Pulmonary/Chest: She is intubated. She has rhonchi.   Abdominal: Soft. Bowel sounds are normal. She exhibits no distension.   Musculoskeletal: Normal range of motion.             Data Review       Old Records Request:   Completed  Current Records review and summary: Completed    Lab Data Review:  Recent Results (from the past 24 hour(s))   CBC w/ Differential    Collection Time: 05/23/17  2:10 PM   Result Value Ref Range    WBC 5.4 4.8 - 10.8 K/uL    RBC 3.49 (L) 4.20 - 5.40 M/uL    Hemoglobin 10.4 (L) 12.0 - 16.0 g/dL    Hematocrit 32.1 (L) 37.0 - 47.0 %    MCV 92.0 81.4 - 97.8 fL    MCH 29.8 27.0 - 33.0 pg    MCHC 32.4 (L) 33.6 - 35.0 g/dL    RDW 51.2 (H) 35.9 - 50.0 fL    Platelet Count 223 164 - 446 K/uL    MPV 9.5 9.0 - 12.9 fL    Neutrophils-Polys 55.40 44.00 - 72.00 %    Lymphocytes 31.30 22.00 - 41.00 %    Monocytes 10.10 0.00 - 13.40 %    Eosinophils 2.40 0.00 - 6.90 %    Basophils 0.60 0.00 - 1.80 %    Immature Granulocytes 0.20 0.00 - 0.90 %    Nucleated RBC 0.00 /100 WBC    Neutrophils (Absolute) 2.97 2.00 - 7.15 K/uL    Lymphs (Absolute) 1.68 1.00 - 4.80 K/uL    Monos (Absolute) 0.54 0.00 - 0.85 K/uL    Eos (Absolute) 0.13 0.00 - 0.51 K/uL    Baso (Absolute) 0.03 0.00 - 0.12 K/uL    Immature Granulocytes (abs) 0.01 0.00 - 0.11 K/uL    NRBC (Absolute) 0.00 K/uL   Complete Metabolic Panel (CMP)    Collection Time: 05/23/17  2:10 PM   Result Value Ref Range    Sodium 143 135 - 145 mmol/L    Potassium 3.6 3.6 - 5.5 mmol/L    Chloride 116 (H) 96 - 112 mmol/L    Co2 21 20 - 33 mmol/L    Anion Gap 6.0 0.0 - 11.9    Glucose 99 65 - 99 mg/dL    Bun 7 (L) 8 - 22 mg/dL    Creatinine 0.59 0.50 - 1.40 mg/dL    Calcium 9.0 8.5 - 10.5 mg/dL     AST(SGOT) 12 12 - 45 U/L    ALT(SGPT) 7 2 - 50 U/L    Alkaline Phosphatase 62 30 - 99 U/L    Total Bilirubin 1.0 0.1 - 1.5 mg/dL    Albumin 3.3 3.2 - 4.9 g/dL    Total Protein 5.9 (L) 6.0 - 8.2 g/dL    Globulin 2.6 1.9 - 3.5 g/dL    A-G Ratio 1.3 g/dL   Troponin STAT    Collection Time: 05/23/17  2:10 PM   Result Value Ref Range    Troponin I <0.01 0.00 - 0.04 ng/mL   APTT    Collection Time: 05/23/17  2:10 PM   Result Value Ref Range    APTT 20.2 (L) 24.7 - 36.0 sec   PT/INR    Collection Time: 05/23/17  2:10 PM   Result Value Ref Range    PT 15.4 (H) 12.0 - 14.6 sec    INR 1.18 (H) 0.87 - 1.13   DIAGNOSTIC ALCOHOL    Collection Time: 05/23/17  2:10 PM   Result Value Ref Range    Diagnostic Alcohol 0.00 0.00 g/dL   Phosphorus    Collection Time: 05/23/17  2:10 PM   Result Value Ref Range    Phosphorus 3.4 2.5 - 4.5 mg/dL   Magnesium    Collection Time: 05/23/17  2:10 PM   Result Value Ref Range    Magnesium 1.9 1.5 - 2.5 mg/dL   Btype Natriuretic Peptide    Collection Time: 05/23/17  2:10 PM   Result Value Ref Range    B Natriuretic Peptide 7 0 - 100 pg/mL   LITHIUM    Collection Time: 05/23/17  2:10 PM   Result Value Ref Range    Lithium 1.5 (H) 0.6 - 1.2 mmol/L   LACTIC ACID    Collection Time: 05/23/17  2:10 PM   Result Value Ref Range    Lactic Acid 1.3 0.5 - 2.0 mmol/L   ACETAMINOPHEN    Collection Time: 05/23/17  2:10 PM   Result Value Ref Range    Acetaminophen -Tylenol <10 10 - 30 ug/mL   SALICYLATE    Collection Time: 05/23/17  2:10 PM   Result Value Ref Range    Salicylates, Quant. 0 (L) 15 - 25 mg/dL   TRIGLYCERIDE    Collection Time: 05/23/17  2:10 PM   Result Value Ref Range    Triglycerides 48 0 - 149 mg/dL   ESTIMATED GFR    Collection Time: 05/23/17  2:10 PM   Result Value Ref Range    GFR If African American >60 >60 mL/min/1.73 m 2    GFR If Non African American >60 >60 mL/min/1.73 m 2   EKG (NOW)    Collection Time: 05/23/17  2:13 PM   Result Value Ref Range    Report       [facility]  [facility]    Test Date:  2017  Pt Name:    SYL BOONE               Department: ER  MRN:        [id number]                      Room:       RD 07  Gender:     F                            Technician: [id number]  :        1990                   Requested By:KANDY VILLAREAL  Order #:    [id number]                    Reading MD: KANDY VILLAREAL MD    Measurements  Intervals                                Axis  Rate:       97                           P:  DC:                                      QRS:        60  QRSD:       84                           T:          -65  QT:         368  QTc:        468    Interpretive Statements  NSR  NONSPECIFIC T ABNORMALITIES, INFERIOR LEADS  Compared to ECG 2017 10:46:12  T-wave abnormality now present  Sinus tachycardia no longer present    Electronically Signed On 2017 14:35:28 PDT by KANDY VILLAREAL MD         Imaging/Procedures Review:    ndependant Imaging Review: Completed  DX-CHEST-PORTABLE (1 VIEW)   Final Result      Clear chest.      OUTSIDE IMAGES-DX CHEST   Preliminary Result      OUTSIDE IMAGES-CT HEAD   Preliminary Result      OUTSIDE IMAGES-DX CHEST   Preliminary Result      DX-CHEST-LIMITED (1 VIEW)    (Results Pending)            EKG:   EKG Independant Review: Completed  QTc:402, HR: 82, Normal Sinus Rhythm, no ST/T changes     (x) Records reviewed and summarized in current documentation             Assessment/Plan     Possible suicidal attempt by drug overdose; hx of SA/SI  Hx amphetamine abuse /IVDA,  History of suicidal attempts. PTSD, schizoaffective disorder  Altered Mental status/Encephalopathy due to drug overdose (UDS + meth, amph, BZ) and prescription antipsychotic (seroquel, haldol), Li and BZ  intoxication- CT head NGT for fracture/ICH;   Li toxicity  VDRF - secondary to acute encephalopathy  GEOVANI    Plan:  -admit to ICU with telemetry  -monitor for QTc ; repeat EKG q4h  -continue full vent support with propofol  based sedation  -cont IVF  -repeat Li level in 4 h after admission; repeat CMP  -monitor I/O  -MH consult when extubated and mentally cleared  -fall, aspiration, seizure, suicide precautions  -sedation vacations and weaning off vent as tolerated  -supportive care (antiemetics, bowel protocol)    Anticipated Hospital stay:  >2 midnights        Quality Measures  EKG reviewed, Labs reviewed, Medications reviewed and Radiology images reviewed  Mckenzie catheter: Critically Ill - Requiring Accurate Measurement of Urinary Output  Central line in place: Need for access    DVT Prophylaxis: Heparin    Ulcer prophylaxis: Yes

## 2017-05-23 NOTE — ED NOTES
Chief Complaint   Patient presents with   • Drug Overdose     Patient to ed via care flight. Patient is a transfer from another hospital. Patient was found on the street in [place] with altered loc. Patient was taken to hospital and intubated to protect her airway. Patient arrives intubated and sedated.

## 2017-05-23 NOTE — PROCEDURES
Date: 05/23/2017  Time: 1450  Indication: Hemodynamic monitoring/Intravenous access  Resident: Yassine Barrios  Attending: Samuel Barragan time-out was completed verifying correct patient, procedure, site, positioning, and special equipment if applicable. The patient was placed in a dependent position appropriate for central line placement based on the vein to be cannulated. The patient’s left neck was prepped and draped in sterile fashion. 1% Lidocaine was used to anesthetize the surrounding skin area. A triple lumen catheter was introduced into the left internal jugular vein using the Seldinger technique and under ultrasound guidance. The catheter was threaded smoothly over the guide wire and appropriate blood return was obtained. Each lumen of the catheter was evacuated of air and flushed with sterile saline. The catheter was then sutured in place to the skin and a sterile dressing applied. Chest xray was ordered to confirm proper line placement and no pneumothorax.    Estimated Blood Loss: 5mL  The patient tolerated the procedure well and there were no complications.

## 2017-05-23 NOTE — ED NOTES
Spoke to patients mother, Gissel. She states that patient has spent most of her life in a psych hospital and has been diagnosed with RAD, schizophrenia, and bipolar. Patient had a similar even around [facility] in which she attempted to OD on her psych medications and wound up in the hospital for 2 weeks here. Patient was released to a mental hospital. Patient stopped taking her medications on 5/19/2017 and her mom kicked her out since she wasn't taking her medications. Patient was found by a family friend on a bench this morning, barely breathing and with a weak pulse. Patient was transport to outlaying facility for stabilization.

## 2017-05-23 NOTE — ED NOTES
Break RN note: Gastric tube placed.  Blood drawn and sent to lab.  Bilateral wrist restraints in place.  On monitors with alarms audible.

## 2017-05-23 NOTE — PROGRESS NOTES
Date of Service 5/23/2017    [employer] Gold Team Attending Note  (see full Resident note dictated in EPIC)    Assessment:  ALOC - Ct head (-), + meth/benzo, marginally elevated Li, aceta/salycilate levels wnl, etoh wnl,  afebrile, current neuro exam non focal, wbc and lactate wnl  Acute Hypoxic Respiratory Failure - 2ndary to above, airway control and agitation control  Chronic Meth Use - + on u tox today  Medical Noncompliance with Psych meds - mother states quit taking few days ago and she kicked  her out 2 days ago  Schizoaffective Disorder  PTSD  Hx of SI/SA  Recent Admit 4/15-21 with similar ALOC and polysubstance abuse    Plan:  Bolus NS 1L now  Maintenance at 150  Repeat Li level in 4 hours to insure downward trend  CBC/CMP/Coags in am   AM sedation vacation/sbt and possible extubation  Will need repeat psych eval once clinically more stable and extubated    I have seen and examined the patient today.  I have reviewed the medical record, laboratory data, imaging and all relevant studies.  I have discussed the plan of care with the Internal Medicine Resident and agree with the note and plan as documented.         Total critical care time, not including billable procedures 40 minutes.

## 2017-05-23 NOTE — ED NOTES
Patients mother: Gissel Solis   [phone]- work  [phone]- [place]  Patients mother: Eileen Solis  [phone]

## 2017-05-24 ENCOUNTER — APPOINTMENT (OUTPATIENT)
Dept: RADIOLOGY | Facility: MEDICAL CENTER | Age: 27
DRG: 917 | End: 2017-05-24
Attending: INTERNAL MEDICINE
Payer: COMMERCIAL

## 2017-05-24 LAB
ANION GAP SERPL CALC-SCNC: 4 MMOL/L (ref 0–11.9)
BASE EXCESS BLDA CALC-SCNC: -4 MMOL/L (ref -4–3)
BASOPHILS # BLD AUTO: 0.6 % (ref 0–1.8)
BASOPHILS # BLD: 0.03 K/UL (ref 0–0.12)
BODY TEMPERATURE: ABNORMAL DEGREES
BUN SERPL-MCNC: 4 MG/DL (ref 8–22)
CALCIUM SERPL-MCNC: 8.8 MG/DL (ref 8.5–10.5)
CHLORIDE SERPL-SCNC: 114 MMOL/L (ref 96–112)
CO2 BLDA-SCNC: 23 MMOL/L (ref 20–33)
CO2 SERPL-SCNC: 22 MMOL/L (ref 20–33)
CORTIS SERPL-MCNC: 8.3 UG/DL (ref 0–23)
CREAT SERPL-MCNC: 0.59 MG/DL (ref 0.5–1.4)
EKG IMPRESSION: NORMAL
EOSINOPHIL # BLD AUTO: 0.31 K/UL (ref 0–0.51)
EOSINOPHIL NFR BLD: 5.9 % (ref 0–6.9)
ERYTHROCYTE [DISTWIDTH] IN BLOOD BY AUTOMATED COUNT: 53.6 FL (ref 35.9–50)
GFR SERPL CREATININE-BSD FRML MDRD: >60 ML/MIN/1.73 M 2
GLUCOSE BLD-MCNC: 78 MG/DL (ref 65–99)
GLUCOSE BLD-MCNC: 87 MG/DL (ref 65–99)
GLUCOSE BLD-MCNC: 89 MG/DL (ref 65–99)
GLUCOSE BLD-MCNC: 93 MG/DL (ref 65–99)
GLUCOSE SERPL-MCNC: 93 MG/DL (ref 65–99)
HCO3 BLDA-SCNC: 22 MMOL/L (ref 17–25)
HCT VFR BLD AUTO: 31.7 % (ref 37–47)
HGB BLD-MCNC: 9.9 G/DL (ref 12–16)
IMM GRANULOCYTES # BLD AUTO: 0.02 K/UL (ref 0–0.11)
IMM GRANULOCYTES NFR BLD AUTO: 0.4 % (ref 0–0.9)
LYMPHOCYTES # BLD AUTO: 1.29 K/UL (ref 1–4.8)
LYMPHOCYTES NFR BLD: 24.6 % (ref 22–41)
MAGNESIUM SERPL-MCNC: 1.8 MG/DL (ref 1.5–2.5)
MCH RBC QN AUTO: 29.6 PG (ref 27–33)
MCHC RBC AUTO-ENTMCNC: 31.2 G/DL (ref 33.6–35)
MCV RBC AUTO: 94.9 FL (ref 81.4–97.8)
MONOCYTES # BLD AUTO: 0.84 K/UL (ref 0–0.85)
MONOCYTES NFR BLD AUTO: 16 % (ref 0–13.4)
NEUTROPHILS # BLD AUTO: 2.75 K/UL (ref 2–7.15)
NEUTROPHILS NFR BLD: 52.5 % (ref 44–72)
NRBC # BLD AUTO: 0 K/UL
NRBC BLD AUTO-RTO: 0 /100 WBC
O2/TOTAL GAS SETTING VFR VENT: 35 %
PCO2 BLDA: 41.2 MMHG (ref 26–37)
PCO2 TEMP ADJ BLDA: 41 MMHG (ref 26–37)
PH BLDA: 7.34 [PH] (ref 7.4–7.5)
PH TEMP ADJ BLDA: 7.34 [PH] (ref 7.4–7.5)
PHOSPHATE SERPL-MCNC: 3.4 MG/DL (ref 2.5–4.5)
PLATELET # BLD AUTO: 195 K/UL (ref 164–446)
PMV BLD AUTO: 9.5 FL (ref 9–12.9)
PO2 BLDA: 122 MMHG (ref 64–87)
PO2 TEMP ADJ BLDA: 121 MMHG (ref 64–87)
POTASSIUM SERPL-SCNC: 3.7 MMOL/L (ref 3.6–5.5)
RBC # BLD AUTO: 3.34 M/UL (ref 4.2–5.4)
SAO2 % BLDA: 98 % (ref 93–99)
SODIUM SERPL-SCNC: 140 MMOL/L (ref 135–145)
SPECIMEN DRAWN FROM PATIENT: ABNORMAL
WBC # BLD AUTO: 5.2 K/UL (ref 4.8–10.8)

## 2017-05-24 PROCEDURE — 700102 HCHG RX REV CODE 250 W/ 637 OVERRIDE(OP): Performed by: INTERNAL MEDICINE

## 2017-05-24 PROCEDURE — 84100 ASSAY OF PHOSPHORUS: CPT

## 2017-05-24 PROCEDURE — 85025 COMPLETE CBC W/AUTO DIFF WBC: CPT

## 2017-05-24 PROCEDURE — 770022 HCHG ROOM/CARE - ICU (200)

## 2017-05-24 PROCEDURE — 82533 TOTAL CORTISOL: CPT

## 2017-05-24 PROCEDURE — 700111 HCHG RX REV CODE 636 W/ 250 OVERRIDE (IP): Performed by: INTERNAL MEDICINE

## 2017-05-24 PROCEDURE — A9270 NON-COVERED ITEM OR SERVICE: HCPCS | Performed by: INTERNAL MEDICINE

## 2017-05-24 PROCEDURE — 83735 ASSAY OF MAGNESIUM: CPT

## 2017-05-24 PROCEDURE — 94640 AIRWAY INHALATION TREATMENT: CPT

## 2017-05-24 PROCEDURE — 700111 HCHG RX REV CODE 636 W/ 250 OVERRIDE (IP): Performed by: HOSPITALIST

## 2017-05-24 PROCEDURE — 93010 ELECTROCARDIOGRAM REPORT: CPT | Mod: 76 | Performed by: INTERNAL MEDICINE

## 2017-05-24 PROCEDURE — 94003 VENT MGMT INPAT SUBQ DAY: CPT

## 2017-05-24 PROCEDURE — 99291 CRITICAL CARE FIRST HOUR: CPT | Performed by: INTERNAL MEDICINE

## 2017-05-24 PROCEDURE — 93005 ELECTROCARDIOGRAM TRACING: CPT | Performed by: INTERNAL MEDICINE

## 2017-05-24 PROCEDURE — 82803 BLOOD GASES ANY COMBINATION: CPT

## 2017-05-24 PROCEDURE — 82962 GLUCOSE BLOOD TEST: CPT | Mod: 91

## 2017-05-24 PROCEDURE — 700101 HCHG RX REV CODE 250: Performed by: HOSPITALIST

## 2017-05-24 PROCEDURE — 94150 VITAL CAPACITY TEST: CPT

## 2017-05-24 PROCEDURE — 36600 WITHDRAWAL OF ARTERIAL BLOOD: CPT

## 2017-05-24 PROCEDURE — 80048 BASIC METABOLIC PNL TOTAL CA: CPT

## 2017-05-24 PROCEDURE — 700101 HCHG RX REV CODE 250: Performed by: INTERNAL MEDICINE

## 2017-05-24 PROCEDURE — 71010 DX-CHEST-PORTABLE (1 VIEW): CPT

## 2017-05-24 RX ORDER — POTASSIUM CHLORIDE 1.5 G/1.58G
20 POWDER, FOR SOLUTION ORAL ONCE
Status: CANCELLED | OUTPATIENT
Start: 2017-05-24 | End: 2017-05-25

## 2017-05-24 RX ORDER — DOPAMINE HYDROCHLORIDE 160 MG/100ML
INJECTION, SOLUTION INTRAVENOUS
Status: DISCONTINUED
Start: 2017-05-24 | End: 2017-05-24

## 2017-05-24 RX ORDER — MAGNESIUM SULFATE HEPTAHYDRATE 40 MG/ML
2 INJECTION, SOLUTION INTRAVENOUS ONCE
Status: COMPLETED | OUTPATIENT
Start: 2017-05-24 | End: 2017-05-24

## 2017-05-24 RX ORDER — HALOPERIDOL 5 MG/ML
1-5 INJECTION INTRAMUSCULAR EVERY 4 HOURS PRN
Status: DISCONTINUED | OUTPATIENT
Start: 2017-05-24 | End: 2017-05-26

## 2017-05-24 RX ORDER — NOREPINEPHRINE BITARTRATE 1 MG/ML
INJECTION, SOLUTION INTRAVENOUS
Status: DISCONTINUED
Start: 2017-05-24 | End: 2017-05-24

## 2017-05-24 RX ORDER — QUETIAPINE FUMARATE 25 MG/1
50 TABLET, FILM COATED ORAL 3 TIMES DAILY
Status: DISCONTINUED | OUTPATIENT
Start: 2017-05-24 | End: 2017-05-25

## 2017-05-24 RX ORDER — SODIUM CHLORIDE 9 MG/ML
INJECTION, SOLUTION INTRAVENOUS
Status: ACTIVE
Start: 2017-05-24 | End: 2017-05-24

## 2017-05-24 RX ORDER — POTASSIUM CHLORIDE 1.5 G/1.58G
20 POWDER, FOR SOLUTION ORAL ONCE
Status: DISCONTINUED | OUTPATIENT
Start: 2017-05-24 | End: 2017-05-24

## 2017-05-24 RX ORDER — DEXTROSE MONOHYDRATE 50 MG/ML
INJECTION, SOLUTION INTRAVENOUS
Status: DISCONTINUED
Start: 2017-05-24 | End: 2017-05-24

## 2017-05-24 RX ADMIN — STANDARDIZED SENNA CONCENTRATE AND DOCUSATE SODIUM 2 TABLET: 8.6; 5 TABLET, FILM COATED ORAL at 21:03

## 2017-05-24 RX ADMIN — QUETIAPINE FUMARATE 50 MG: 25 TABLET ORAL at 16:44

## 2017-05-24 RX ADMIN — IPRATROPIUM BROMIDE AND ALBUTEROL SULFATE 3 ML: .5; 3 SOLUTION RESPIRATORY (INHALATION) at 11:37

## 2017-05-24 RX ADMIN — IPRATROPIUM BROMIDE AND ALBUTEROL SULFATE 3 ML: .5; 3 SOLUTION RESPIRATORY (INHALATION) at 23:17

## 2017-05-24 RX ADMIN — IPRATROPIUM BROMIDE AND ALBUTEROL SULFATE 3 ML: .5; 3 SOLUTION RESPIRATORY (INHALATION) at 15:38

## 2017-05-24 RX ADMIN — FAMOTIDINE 20 MG: 10 INJECTION, SOLUTION INTRAVENOUS at 21:02

## 2017-05-24 RX ADMIN — FENTANYL CITRATE 50 MCG: 50 INJECTION, SOLUTION INTRAMUSCULAR; INTRAVENOUS at 12:53

## 2017-05-24 RX ADMIN — POTASSIUM CHLORIDE, DEXTROSE MONOHYDRATE AND SODIUM CHLORIDE: 150; 5; 450 INJECTION, SOLUTION INTRAVENOUS at 16:00

## 2017-05-24 RX ADMIN — IPRATROPIUM BROMIDE AND ALBUTEROL SULFATE 3 ML: .5; 3 SOLUTION RESPIRATORY (INHALATION) at 06:52

## 2017-05-24 RX ADMIN — CHLORHEXIDINE GLUCONATE 15 ML: 1.2 RINSE ORAL at 07:27

## 2017-05-24 RX ADMIN — PROPOFOL 60 MCG/KG/MIN: 10 INJECTION, EMULSION INTRAVENOUS at 02:51

## 2017-05-24 RX ADMIN — QUETIAPINE FUMARATE 50 MG: 25 TABLET ORAL at 21:02

## 2017-05-24 RX ADMIN — LIDOCAINE HYDROCHLORIDE 2 ML: 10 INJECTION, SOLUTION INFILTRATION; PERINEURAL at 11:47

## 2017-05-24 RX ADMIN — MAGNESIUM SULFATE HEPTAHYDRATE 2 G: 40 INJECTION, SOLUTION INTRAVENOUS at 07:27

## 2017-05-24 RX ADMIN — HEPARIN SODIUM 5000 UNITS: 5000 INJECTION, SOLUTION INTRAVENOUS; SUBCUTANEOUS at 05:18

## 2017-05-24 RX ADMIN — FAMOTIDINE 20 MG: 10 INJECTION, SOLUTION INTRAVENOUS at 07:27

## 2017-05-24 RX ADMIN — POTASSIUM CHLORIDE, DEXTROSE MONOHYDRATE AND SODIUM CHLORIDE: 150; 5; 450 INJECTION, SOLUTION INTRAVENOUS at 07:28

## 2017-05-24 RX ADMIN — IPRATROPIUM BROMIDE AND ALBUTEROL SULFATE 3 ML: .5; 3 SOLUTION RESPIRATORY (INHALATION) at 03:07

## 2017-05-24 RX ADMIN — IPRATROPIUM BROMIDE AND ALBUTEROL SULFATE 3 ML: .5; 3 SOLUTION RESPIRATORY (INHALATION) at 18:42

## 2017-05-24 RX ADMIN — ENOXAPARIN SODIUM 40 MG: 100 INJECTION SUBCUTANEOUS at 11:47

## 2017-05-24 RX ADMIN — CHLORHEXIDINE GLUCONATE 15 ML: 1.2 RINSE ORAL at 21:03

## 2017-05-24 RX ADMIN — PROPOFOL 30 MCG/KG/MIN: 10 INJECTION, EMULSION INTRAVENOUS at 11:47

## 2017-05-24 ASSESSMENT — PULMONARY FUNCTION TESTS: FVC: 2.8

## 2017-05-24 NOTE — PROGRESS NOTES
Paged regarding low blood sugar. Patient received D50 twice for blood sugar of 63 and 74. Patient admitted for overdose, likely lithium and seroquel. No hx of DM.  Accurate history of drug ingestion not known. Ordered insulin, C-peptide, betahydroxy, proinsulin and c-peptide level. Started on D5 1/2 NS with potassium. Accu check Q1H until blood sugar stabilizes.   Contacted poison control. Case number: [id number]. Recommended to continue current management. Does not think this is overdose with oral hypoglycemic agents. Also recommended to check beta hcg, ordered.

## 2017-05-24 NOTE — CARE PLAN
Problem: Risk for injury related to physical restraint use  Goal: Safe and appropriate use of physical restraints. Restraints discontinued at the earliest possibility while ensuring patient safety.  Outcome: PROGRESSING AS EXPECTED  Restraint risk assessment performed qshift. Placement verified and skin integrity assessed. Pt turned q2h.    Problem: Mechanical Ventilation  Goal: Safe management of artificial airway and ventilation  Outcome: PROGRESSING AS EXPECTED  VAP bundle in place. Q2h suctioning and oral care provided. Continuous pulse ox monitoring in place. Collaborating with MD and RT for vent management.

## 2017-05-24 NOTE — CARE PLAN
Problem: Ventilation Defect:  Goal: Ability to achieve and maintain unassisted ventilation or tolerate decreased levels of ventilator support  Intervention: Support and monitor invasive and noninvasive mechanical ventilation  Adult Ventilation Update    Total Vent Days: 2      Patient Lines/Drains/Airways Status    Active Airway      Name: Placement date: Placement time: Site: Days:     Airway Group ET Tube Oral 7.5 05/23/17    Oral  2                 Static Compliance (ml / cm H2O): 52.4 (05/24/17 0307)    Sputum/Suction   Cough: Productive (05/24/17 0307)  Sputum Amount: Scant (05/24/17 0307)  Sputum Color: Clear;White (05/24/17 0307)  Sputum Consistency: Thin (05/24/17 0307)    Events/Summary/Plan: recieved pt from SICU. Will continue to monitor.

## 2017-05-24 NOTE — PROGRESS NOTES
Bedside report received from ER RN, Edie.  Patient transferred from R10 to S122 on a ventilator with ACLS RN, respiratory and CCT.  Propofol gtt running at 10 mcg/kg/min and NS running to gravity.

## 2017-05-24 NOTE — CARE PLAN
Problem: Pain Management  Goal: Pain level will decrease to patient’s comfort goal  Outcome: PROGRESSING AS EXPECTED  Pain assessed at least q2h and as needed. Extra blankets and pillows used for comfort and positioning as needed.    Problem: Skin Integrity  Goal: Risk for impaired skin integrity will decrease  Outcome: PROGRESSING AS EXPECTED  Skin integrity, appearance, temperature and risk factors for impaired skin integrity assessed and monitored throughout shift. Q2hour turns in place. Pillows in use for support and positioning.

## 2017-05-24 NOTE — PROGRESS NOTES
Pulmonary Critical Care Progress Note        DOS:  5/24/2017    Chief Complaint: drug overdose     History of Present Illness:  26 y.o. female with PMH of amphetamine abuse /IVDA,  History of suicidal attempts. PTSD, schizoaffective disorder, was  brought in by care flight from [facility] for altered level of consciousness secondary to likely medication/polysubstance overdose. History obtained from MR, as pt is unresponsive. Per EMS, the patient was found down outside of medical clinic where she was noted to be somnolent and intermittently unresponsive, with intermittent lateral gaze palsy. She  was given 1 mg of Narcan probably with no good response,  And patient ended up being intubated to protect airways, using etomidate, succinylcholine, rocuronium, and Versed. She got 1 L NS outside and given 1 more L of NS at [facility] ED.      ROS:  Respiratory: unable to perform due to the patient's inability to effectively communicate, Cardiac: unable to perform due to the patient's inability to effectively communicate,   GI: unable to perform due to the patient's inability to effectively communicate.      Interval Events:  24 hour interval history reviewed   Positive for meth and lithium   SR/ST, BP ok  Following, Prop 30.   500 cc from orozco   Tmax 99, WBC normal      Half amp D50 X2  CXR is clear.   42 RSBI with SBT.    PFSH:  No change.    Respiratory:  Key Vent Mode: Spont, Rate (breaths/min): 16, Vt Target (mL): 370, PEEP/CPAP: 8, FiO2: 35, Static Compliance (ml / cm H2O): 121, Weaning Trial Stopped due to:: Pt weaned for 1 hour and returned to rest settings per protocol, Length of Weaning Trial (Hours): 1 hour, Control VTE (exp VT): 393  Pulse Oximetry: 100 %   Exam: No distress on SBT. Lungs clear with few coarse rhonchi.  ImagingAvailable data reviewed   CXR reviewed with team.       Recent Labs      05/23/17   1424  05/24/17   0505   ISTATAPH  7.321*  7.336*   ISTATAPCO2  41.1*  41.2*   ISTATAPO2  138*  122*    ISTATATCO2  22  23   [id number]  99  98   ISTATARTHCO3  21.2  22.0   ISTATARTBE  -5*  -4   ISTATTEMP  98.0 F  36.9 C   ISTATFIO2  40  35   ISTATSPEC  Arterial  Arterial   ISTATAPHTC  7.326*  7.338*   [id number]  136*  121*       HemoDynamics:  Pulse: (!) 102, Heart Rate (Monitored): (!) 104  Blood Pressure: 106/62 mmHg, NIBP: 114/76 mmHg     Exam:  ST, RR, no murmur, no edema, good perfusion  Imaging: Available data reviewed     Recent Labs      05/23/17   1410  05/23/17   1855  05/23/17   2130   CPKTOTAL   --   53  47   TROPONINI  <0.01   --    --    BNPBTYPENAT  7   --    --        Neuro:  GCS Total Red Coma Score: 9  Exam: Sedate with propofol 30, PERRL, restrained, NFE, no meningeal signs  Imaging: Available data reviewed    Fluids:  Intake/Output       05/22/17 0700 - 05/23/17 0659 (Not Admitted) 05/23/17 0700 - 05/24/17 0659 05/24/17 0700 - 05/25/17 0659      8688-5948 5256-1662 Total 0008-8742 6788-2137 Total 5691-6537 6648-4383 Total       Intake    I.V.  --  -- --  --  2181.3 2181.3  41.6  -- 41.6    Propofol Volume -- -- -- -- 221.3 221.3 21.6 -- 21.6    IV Volume (NS) -- -- -- -- 885 885 20 -- 20    IV Volume (D5 1/2NS 20KCL) -- -- -- -- 875 875 -- -- --    IV Piggyback Volume (IV Piggyback) -- -- -- -- 200 200 -- -- --    Enteral  --  -- --  --  90 90  --  -- --    Free Water / Tube Flush -- -- -- -- 90 90 -- -- --    Total Intake -- -- -- -- 2271.3 2271.3 41.6 -- 41.6       Output    Urine  --  -- --  --  2065 2065  --  -- --    Indwelling Cathether -- -- -- -- 2065 2065 -- -- --    Drains  --  -- --  --  200 200  --  -- --    Oral Gastric Tube -- -- -- -- 200 200 -- -- --    Stool  --  -- --  --  -- --  --  -- --    Number of Times Stooled -- -- -- -- 0 x 0 x -- -- --    Total Output -- -- -- -- 2265 2265 -- -- --       Net I/O     -- -- -- -- 6.3 6.3 41.6 -- 41.6        Weight: 64.1 kg (141 lb 5 oz)  Recent Labs      05/23/17   1410  05/23/17   1855  05/24/17   0510   SODIUM  143  142  140    POTASSIUM  3.6  3.5*  3.7   CHLORIDE  116*  118*  114*   CO2  21  20  22   BUN  7*  5*  4*   CREATININE  0.59  0.56  0.59   MAGNESIUM  1.9   --   1.8   PHOSPHORUS  3.4   --   3.4   CALCIUM  9.0  8.5  8.8       GI/Nutrition:  Exam: Flat, soft, NT/ND, positive Bowel sounds  Imaging: Available data reviewed  NPO     Liver Function  Recent Labs      17   1410  17   1855  17   0510   ALTSGPT  7   --    --    ASTSGOT  12   --    --    ALKPHOSPHAT  62   --    --    TBILIRUBIN  1.0   --    --    GLUCOSE  99  74  93       Heme:  Recent Labs      17   1410  17   0510   RBC  3.49*  3.34*   HEMOGLOBIN  10.4*  9.9*   HEMATOCRIT  32.1*  31.7*   PLATELETCT  223  195   PROTHROMBTM  15.4*   --    APTT  20.2*   --    INR  1.18*   --        Infectious Disease:  Temp  Av.7 °C (98.1 °F)  Min: 36.4 °C (97.6 °F)  Max: 37.2 °C (99 °F)  Monitored Temp  Av.2 °C (97.2 °F)  Min: 36.2 °C (97.2 °F)  Max: 36.3 °C (97.3 °F)  Micro: reviewed     Recent Labs      17   1410  17   0510   WBC  5.4  5.2   NEUTSPOLYS  55.40  52.50   LYMPHOCYTES  31.30  24.60   MONOCYTES  10.10  16.00*   EOSINOPHILS  2.40  5.90   BASOPHILS  0.60  0.60   ASTSGOT  12   --    ALTSGPT  7   --    ALKPHOSPHAT  62   --    TBILIRUBIN  1.0   --      Current Facility-Administered Medications   Medication Dose Frequency Provider Last Rate Last Dose   • magnesium sulfate IVPB premix 2 g  2 g Once Michelle Sun M.D. 25 mL/hr at 17 0727 2 g at 17 07   • SODIUM CHLORIDE 0.9 % IV SOLN           • DOPAMINE IN D5W 1.6-5 MG/ML-% IV SOLN           • NOREPINEPHRINE BITARTRATE 1 MG/ML IV SOLN           • DEXTROSE 5 % IV SOLN           • midazolam (VERSED) injection 1-5 mg  1-5 mg Q HOUR PRN Homer Barragan M.D.       • Respiratory Care per Protocol   Continuous RT Angelo Sandy M.D.       • senna-docusate (PERICOLACE or SENOKOT S) 8.6-50 MG per tablet 2 Tab  2 Tab BID Angelo Sandy M.D.   2 Tab at 17 0727     And   • polyethylene glycol/lytes (MIRALAX) PACKET 1 Packet  1 Packet QDAY PRN Angelo Sandy M.D.        And   • magnesium hydroxide (MILK OF MAGNESIA) suspension 30 mL  30 mL QDAY PRN Angelo Sandy M.D.        And   • bisacodyl (DULCOLAX) suppository 10 mg  10 mg QDAY PRN Angelo Sandy M.D.       • chlorhexidine (PERIDEX) 0.12 % solution 15 mL  15 mL BID Angelo Sandy M.D.   15 mL at 05/24/17 0727   • lidocaine (XYLOCAINE) 1%  injection  1-2 mL Q30 MIN PRN Angelo Sandy M.D.       • MD ALERT...Adult ICU Electrolyte Replacement per Pharmacy Protocol   pharmacy to dose Angelo Sandy M.D.       • fentaNYL (SUBLIMAZE) injection 25 mcg  25 mcg Q HOUR PRN Angelo Sandy M.D.        Or   • fentaNYL (SUBLIMAZE) injection 50 mcg  50 mcg Q HOUR PRN Angelo Sandy M.D.        Or   • fentaNYL (SUBLIMAZE) injection 100 mcg  100 mcg Q HOUR PRN Angelo Sandy M.D.       • ipratropium-albuterol (DUONEB) nebulizer solution 3 mL  3 mL Q2HRS PRN (RT) Angelo Sandy M.D.       • ipratropium-albuterol (DUONEB) nebulizer solution 3 mL  3 mL Q4HRS (RT) Angelo Sandy M.D.   3 mL at 05/24/17 0652   • famotidine (PEPCID) tablet 20 mg  20 mg Q12HRS Angelo Sandy M.D.        Or   • famotidine (PEPCID) injection 20 mg  20 mg Q12HRS Angelo Sandy M.D.   20 mg at 05/24/17 0727   • insulin lispro (HUMALOG) injection 1-6 Units  1-6 Units Q6HRS Angelo Sandy M.D.   Stopped at 05/23/17 1840   • glucose 4 g chewable tablet 16 g  16 g Q15 MIN PRN Angelo Sandy M.D.        And   • dextrose 50% (D50W) injection 25 mL  25 mL Q15 MIN PRN Angelo Sandy M.D.   Stopped at 05/23/17 2128   • propofol (DIPRIVAN) injection  5-80 mcg/kg/min Continuous Angelo Sandy M.D. 10.8 mL/hr at 05/24/17 0700 30 mcg/kg/min at 05/24/17 0700   • heparin injection 5,000 Units  5,000 Units Q8HRS Jimbo Charlton M.D.   5,000 Units at 05/24/17 0518   • thiamine (B-1) 100 mg in D5W 100 mL IVPB  100 mg DAILY Kash DICKENS M.D. 200  mL/hr at 05/23/17 2141 100 mg at 05/23/17 2141   • dextrose 5 % and 0.45 % NaCl with KCl 20 mEq   Continuous Kash DICKENS M.D. 125 mL/hr at 05/24/17 0728       Last reviewed on 5/23/2017  3:56 PM by Ck Morales    Quality  Measures:  Labs reviewed, Radiology images reviewed, Medications reviewed and EKG reviewed  Mckenzie catheter: Critically Ill - Requiring Accurate Measurement of Urinary Output and Unconscious / Sedated Patient on a Ventilator      DVT Prophylaxis: Heparin  DVT prophylaxis - mechanical: SCDs  Ulcer prophylaxis: Yes    Assessed for rehab: Patient unable to tolerate rehabilitation therapeutic regimen      Problems/plan:  Acute hypoxemic respiratory failure - not protecting airway   Intubated 5/23 in ER   SBTs to start when LOC better   RT protocols   Sedation vacations/Mobilize  Altered LOC - CT head neg,    Utox + meth/benzo, marginally elevated Li,    aceta/salycilate levels wnl, etoh wnl, afebrile,    current neuro exam remains non focal, wbc and lactate wnl - neck supple  H/o Chronic Meth Use - + on u tox today  Medical Noncompliance with Psych meds    - mother states quit taking few days ago and she kicked her out 2 days ago  Schizoaffective Disorder - Seroquel/Li+  PTSD  Hx of SI/SA  Anemai - no clinical bleeding   Follow - transfuse PRN  ERP  Recent Admit 4/15-21 with similar ALOC and polysubstance abuse  Enteral nutrition via Cortrak if not extubated today  Prophylaxis    Discussed patient condition and risk of morbidity and/or mortality with RN, RT, Pharmacy, [facility] resident and Charge nurse / hot rounds.    The patient remains critically ill.  Critical care time = 35 minutes in directly providing and coordinating critical care and extensive data review.  No time overlap and excludes procedures.    Myrtle MONTEMAYOR (Je), am scribing for, and in the presence of, Pierce Siegel M.D.  Electronically signed by: Myrtle Mohamud), 5/24/2017  Pierce MONTEMAYOR M.D. personally  performed the services described in this documentation, as scribed by Myrtle Duarte in my presence, and it is both accurate and complete.

## 2017-05-24 NOTE — CARE PLAN
Problem: Ventilation Defect:  Goal: Ability to achieve and maintain unassisted ventilation or tolerate decreased levels of ventilator support  Outcome: PROGRESSING AS EXPECTED  Intervention: Support and monitor invasive and noninvasive mechanical ventilation  Vent Day 2  Intervention: Monitor ventilator weaning response  SBT BID pt triggering back up and gets agitated when sedation is titrated

## 2017-05-24 NOTE — PROGRESS NOTES
2240: Pt's prior to arrival orozco removed and new orozco placed with sterile technique per protocol.

## 2017-05-24 NOTE — RESPIRATORY CARE
Ventilator Weaning Update    Patient is on vent day 2.  SBT was tolerated for a minimum of 1 hour hours on settings of 5/8.    Wean parameters for this SBT were:  #FVC / Vital Capacity (liters) :  (not collected) (05/24/17 0802)  NIF (cm H2O) :  (not collected) (05/24/17 0802)  Rapid Shallow Breathing Index (RR/VT): 42 (05/24/17 0802)  RR (bpm): 17 (05/24/17 0802)  Spontaneous VE: 6.8 (05/24/17 0802)  Spontaneous VT: 394 (05/24/17 0802)       Weaning Trial Stopped due to:: Pt weaned for 1 hour and returned to rest settings per protocol

## 2017-05-24 NOTE — CARE PLAN
Problem: Respiratory:  Goal: Respiratory status will improve  Pt on ventilator and spontaneous breathing trials being completed by respiratory.     Problem: Skin Integrity  Goal: Risk for impaired skin integrity will decrease  Pt being turned q2h

## 2017-05-25 ENCOUNTER — APPOINTMENT (OUTPATIENT)
Dept: RADIOLOGY | Facility: MEDICAL CENTER | Age: 27
DRG: 917 | End: 2017-05-25
Attending: INTERNAL MEDICINE
Payer: COMMERCIAL

## 2017-05-25 PROBLEM — T50.901A: Status: RESOLVED | Noted: 2017-05-23 | Resolved: 2017-05-25

## 2017-05-25 PROBLEM — R50.9 FEVER: Status: ACTIVE | Noted: 2017-05-25

## 2017-05-25 PROBLEM — Z91.51 HISTORY OF ATTEMPTED SUICIDE: Status: ACTIVE | Noted: 2017-05-25

## 2017-05-25 PROBLEM — E87.6 HYPOKALEMIA: Status: ACTIVE | Noted: 2017-05-25

## 2017-05-25 PROBLEM — G93.40 ACUTE ENCEPHALOPATHY: Status: ACTIVE | Noted: 2017-05-25

## 2017-05-25 PROBLEM — F25.9 SCHIZOAFFECTIVE DISORDER (HCC): Status: ACTIVE | Noted: 2017-05-25

## 2017-05-25 PROBLEM — J96.90 RESPIRATORY FAILURE (HCC): Status: RESOLVED | Noted: 2017-04-15 | Resolved: 2017-05-25

## 2017-05-25 LAB
ANION GAP SERPL CALC-SCNC: 5 MMOL/L (ref 0–11.9)
BASE EXCESS BLDA CALC-SCNC: -1 MMOL/L (ref -4–3)
BASOPHILS # BLD AUTO: 0.7 % (ref 0–1.8)
BASOPHILS # BLD: 0.05 K/UL (ref 0–0.12)
BODY TEMPERATURE: ABNORMAL DEGREES
BUN SERPL-MCNC: 2 MG/DL (ref 8–22)
C PEPTIDE SERPL-MCNC: 1.6 NG/ML (ref 0.8–3.5)
CALCIUM SERPL-MCNC: 8.6 MG/DL (ref 8.5–10.5)
CHLORIDE SERPL-SCNC: 110 MMOL/L (ref 96–112)
CO2 BLDA-SCNC: 25 MMOL/L (ref 20–33)
CO2 SERPL-SCNC: 24 MMOL/L (ref 20–33)
CREAT SERPL-MCNC: 0.56 MG/DL (ref 0.5–1.4)
EOSINOPHIL # BLD AUTO: 0.11 K/UL (ref 0–0.51)
EOSINOPHIL NFR BLD: 1.6 % (ref 0–6.9)
ERYTHROCYTE [DISTWIDTH] IN BLOOD BY AUTOMATED COUNT: 52 FL (ref 35.9–50)
GFR SERPL CREATININE-BSD FRML MDRD: >60 ML/MIN/1.73 M 2
GLUCOSE BLD-MCNC: 105 MG/DL (ref 65–99)
GLUCOSE BLD-MCNC: 82 MG/DL (ref 65–99)
GLUCOSE BLD-MCNC: 90 MG/DL (ref 65–99)
GLUCOSE BLD-MCNC: 93 MG/DL (ref 65–99)
GLUCOSE SERPL-MCNC: 153 MG/DL (ref 65–99)
HCO3 BLDA-SCNC: 23.8 MMOL/L (ref 17–25)
HCT VFR BLD AUTO: 32.3 % (ref 37–47)
HGB BLD-MCNC: 10.5 G/DL (ref 12–16)
IMM GRANULOCYTES # BLD AUTO: 0.02 K/UL (ref 0–0.11)
IMM GRANULOCYTES NFR BLD AUTO: 0.3 % (ref 0–0.9)
INSULIN P FAST SERPL-ACNC: 3 UIU/ML (ref 3–19)
LYMPHOCYTES # BLD AUTO: 1.01 K/UL (ref 1–4.8)
LYMPHOCYTES NFR BLD: 14.7 % (ref 22–41)
MAGNESIUM SERPL-MCNC: 1.9 MG/DL (ref 1.5–2.5)
MCH RBC QN AUTO: 29.9 PG (ref 27–33)
MCHC RBC AUTO-ENTMCNC: 32.5 G/DL (ref 33.6–35)
MCV RBC AUTO: 92 FL (ref 81.4–97.8)
MONOCYTES # BLD AUTO: 0.99 K/UL (ref 0–0.85)
MONOCYTES NFR BLD AUTO: 14.4 % (ref 0–13.4)
NEUTROPHILS # BLD AUTO: 4.7 K/UL (ref 2–7.15)
NEUTROPHILS NFR BLD: 68.3 % (ref 44–72)
NRBC # BLD AUTO: 0 K/UL
NRBC BLD AUTO-RTO: 0 /100 WBC
O2/TOTAL GAS SETTING VFR VENT: 35 %
PCO2 BLDA: 39.3 MMHG (ref 26–37)
PCO2 TEMP ADJ BLDA: 41.2 MMHG (ref 26–37)
PH BLDA: 7.39 [PH] (ref 7.4–7.5)
PH TEMP ADJ BLDA: 7.38 [PH] (ref 7.4–7.5)
PHOSPHATE SERPL-MCNC: 3.8 MG/DL (ref 2.5–4.5)
PLATELET # BLD AUTO: 213 K/UL (ref 164–446)
PMV BLD AUTO: 9.4 FL (ref 9–12.9)
PO2 BLDA: 136 MMHG (ref 64–87)
PO2 TEMP ADJ BLDA: 143 MMHG (ref 64–87)
POTASSIUM SERPL-SCNC: 3.5 MMOL/L (ref 3.6–5.5)
PROINSULIN P 12H FAST SERPL-SCNC: 3 PMOL/L
RBC # BLD AUTO: 3.51 M/UL (ref 4.2–5.4)
SAO2 % BLDA: 99 % (ref 93–99)
SODIUM SERPL-SCNC: 139 MMOL/L (ref 135–145)
SPECIMEN DRAWN FROM PATIENT: ABNORMAL
TRIGL SERPL-MCNC: 60 MG/DL (ref 0–149)
WBC # BLD AUTO: 6.9 K/UL (ref 4.8–10.8)

## 2017-05-25 PROCEDURE — 700111 HCHG RX REV CODE 636 W/ 250 OVERRIDE (IP): Performed by: HOSPITALIST

## 2017-05-25 PROCEDURE — 700102 HCHG RX REV CODE 250 W/ 637 OVERRIDE(OP): Performed by: INTERNAL MEDICINE

## 2017-05-25 PROCEDURE — 700102 HCHG RX REV CODE 250 W/ 637 OVERRIDE(OP): Performed by: HOSPITALIST

## 2017-05-25 PROCEDURE — 83735 ASSAY OF MAGNESIUM: CPT

## 2017-05-25 PROCEDURE — 700101 HCHG RX REV CODE 250: Performed by: HOSPITALIST

## 2017-05-25 PROCEDURE — 36600 WITHDRAWAL OF ARTERIAL BLOOD: CPT

## 2017-05-25 PROCEDURE — 94003 VENT MGMT INPAT SUBQ DAY: CPT

## 2017-05-25 PROCEDURE — A9270 NON-COVERED ITEM OR SERVICE: HCPCS | Performed by: PHARMACIST

## 2017-05-25 PROCEDURE — 770022 HCHG ROOM/CARE - ICU (200)

## 2017-05-25 PROCEDURE — 700101 HCHG RX REV CODE 250: Performed by: INTERNAL MEDICINE

## 2017-05-25 PROCEDURE — 71010 DX-CHEST-PORTABLE (1 VIEW): CPT

## 2017-05-25 PROCEDURE — 85025 COMPLETE CBC W/AUTO DIFF WBC: CPT

## 2017-05-25 PROCEDURE — 82962 GLUCOSE BLOOD TEST: CPT | Mod: 91

## 2017-05-25 PROCEDURE — 94150 VITAL CAPACITY TEST: CPT

## 2017-05-25 PROCEDURE — 80048 BASIC METABOLIC PNL TOTAL CA: CPT

## 2017-05-25 PROCEDURE — 84100 ASSAY OF PHOSPHORUS: CPT

## 2017-05-25 PROCEDURE — 700111 HCHG RX REV CODE 636 W/ 250 OVERRIDE (IP): Performed by: INTERNAL MEDICINE

## 2017-05-25 PROCEDURE — 94640 AIRWAY INHALATION TREATMENT: CPT

## 2017-05-25 PROCEDURE — A9270 NON-COVERED ITEM OR SERVICE: HCPCS | Performed by: INTERNAL MEDICINE

## 2017-05-25 PROCEDURE — A9270 NON-COVERED ITEM OR SERVICE: HCPCS | Performed by: HOSPITALIST

## 2017-05-25 PROCEDURE — 99291 CRITICAL CARE FIRST HOUR: CPT | Performed by: INTERNAL MEDICINE

## 2017-05-25 PROCEDURE — 700102 HCHG RX REV CODE 250 W/ 637 OVERRIDE(OP): Performed by: PHARMACIST

## 2017-05-25 PROCEDURE — 82803 BLOOD GASES ANY COMBINATION: CPT

## 2017-05-25 PROCEDURE — 84478 ASSAY OF TRIGLYCERIDES: CPT

## 2017-05-25 RX ORDER — POTASSIUM CHLORIDE 1.5 G/1.58G
20 POWDER, FOR SOLUTION ORAL ONCE
Status: DISCONTINUED | OUTPATIENT
Start: 2017-05-25 | End: 2017-05-25

## 2017-05-25 RX ORDER — POTASSIUM CHLORIDE 1.5 G/1.58G
40 POWDER, FOR SOLUTION ORAL ONCE
Status: COMPLETED | OUTPATIENT
Start: 2017-05-25 | End: 2017-05-25

## 2017-05-25 RX ORDER — MAGNESIUM SULFATE HEPTAHYDRATE 40 MG/ML
2 INJECTION, SOLUTION INTRAVENOUS ONCE
Status: COMPLETED | OUTPATIENT
Start: 2017-05-25 | End: 2017-05-25

## 2017-05-25 RX ORDER — ACETAMINOPHEN 325 MG/1
650 TABLET ORAL EVERY 4 HOURS PRN
Status: DISCONTINUED | OUTPATIENT
Start: 2017-05-25 | End: 2017-05-30 | Stop reason: HOSPADM

## 2017-05-25 RX ORDER — QUETIAPINE FUMARATE 100 MG/1
100 TABLET, FILM COATED ORAL 3 TIMES DAILY
Status: DISCONTINUED | OUTPATIENT
Start: 2017-05-25 | End: 2017-05-26

## 2017-05-25 RX ADMIN — PROPOFOL 35 MCG/KG/MIN: 10 INJECTION, EMULSION INTRAVENOUS at 05:25

## 2017-05-25 RX ADMIN — IPRATROPIUM BROMIDE AND ALBUTEROL SULFATE 3 ML: .5; 3 SOLUTION RESPIRATORY (INHALATION) at 07:04

## 2017-05-25 RX ADMIN — CHLORHEXIDINE GLUCONATE 15 ML: 1.2 RINSE ORAL at 08:13

## 2017-05-25 RX ADMIN — ACETAMINOPHEN 650 MG: 325 TABLET, FILM COATED ORAL at 11:35

## 2017-05-25 RX ADMIN — FENTANYL CITRATE 50 MCG: 50 INJECTION, SOLUTION INTRAMUSCULAR; INTRAVENOUS at 08:13

## 2017-05-25 RX ADMIN — IPRATROPIUM BROMIDE AND ALBUTEROL SULFATE 3 ML: .5; 3 SOLUTION RESPIRATORY (INHALATION) at 14:43

## 2017-05-25 RX ADMIN — IPRATROPIUM BROMIDE AND ALBUTEROL SULFATE 3 ML: .5; 3 SOLUTION RESPIRATORY (INHALATION) at 10:56

## 2017-05-25 RX ADMIN — POTASSIUM CHLORIDE, DEXTROSE MONOHYDRATE AND SODIUM CHLORIDE: 150; 5; 450 INJECTION, SOLUTION INTRAVENOUS at 20:30

## 2017-05-25 RX ADMIN — QUETIAPINE FUMARATE 50 MG: 25 TABLET ORAL at 08:13

## 2017-05-25 RX ADMIN — FAMOTIDINE 20 MG: 20 TABLET ORAL at 09:00

## 2017-05-25 RX ADMIN — IPRATROPIUM BROMIDE AND ALBUTEROL SULFATE 3 ML: .5; 3 SOLUTION RESPIRATORY (INHALATION) at 02:29

## 2017-05-25 RX ADMIN — POTASSIUM CHLORIDE 40 MEQ: 1.5 POWDER, FOR SOLUTION ORAL at 08:13

## 2017-05-25 RX ADMIN — QUETIAPINE FUMARATE 100 MG: 100 TABLET ORAL at 20:41

## 2017-05-25 RX ADMIN — ENOXAPARIN SODIUM 40 MG: 100 INJECTION SUBCUTANEOUS at 08:21

## 2017-05-25 RX ADMIN — FENTANYL CITRATE 50 MCG: 50 INJECTION, SOLUTION INTRAMUSCULAR; INTRAVENOUS at 10:47

## 2017-05-25 RX ADMIN — STANDARDIZED SENNA CONCENTRATE AND DOCUSATE SODIUM 2 TABLET: 8.6; 5 TABLET, FILM COATED ORAL at 08:13

## 2017-05-25 RX ADMIN — MAGNESIUM SULFATE IN WATER 2 G: 40 INJECTION, SOLUTION INTRAVENOUS at 08:13

## 2017-05-25 ASSESSMENT — PULMONARY FUNCTION TESTS: FVC: 3

## 2017-05-25 ASSESSMENT — COPD QUESTIONNAIRES
HAVE YOU SMOKED AT LEAST 100 CIGARETTES IN YOUR ENTIRE LIFE: YES
DURING THE PAST 4 WEEKS HOW MUCH DID YOU FEEL SHORT OF BREATH: NONE/LITTLE OF THE TIME
DO YOU EVER COUGH UP ANY MUCUS OR PHLEGM?: NO/ONLY WITH OCCASIONAL COLDS OR INFECTIONS
COPD SCREENING SCORE: 2

## 2017-05-25 ASSESSMENT — PAIN SCALES - GENERAL
PAINLEVEL_OUTOF10: 0

## 2017-05-25 ASSESSMENT — LIFESTYLE VARIABLES: EVER_SMOKED: YES

## 2017-05-25 NOTE — CARE PLAN
Problem: Fluid Volume:  Goal: Will maintain balanced intake and output  Outcome: PROGRESSING AS EXPECTED  Patient has maintained adequate output in orozco

## 2017-05-25 NOTE — CARE PLAN
Problem: Ventilation Defect:  Goal: Ability to achieve and maintain unassisted ventilation or tolerate decreased levels of ventilator support  Outcome: PROGRESSING AS EXPECTED  Adult Ventilation Update    Total Vent Days: 3      Patient Lines/Drains/Airways Status    Active Airway      Name: Placement date: Placement time: Site: Days:     Airway Group ET Tube Oral 7.5 05/23/17    Oral  2               CMV 10 370 +8 30%    #FVC / Vital Capacity (liters) : 2.8 (05/24/17 2155)  NIF (cm H2O) : -41.2 (05/24/17 2155)  Rapid Shallow Breathing Index (RR/VT): 41 (05/24/17 2155)  Plateau Pressure (Q Shift): 15 (05/24/17 2315)  Static Compliance (ml / cm H2O): 128 (05/25/17 0230)    Patient failed trials because of    Barriers to SBT Weaning Trial Stopped due to:: Apnea > 30 seconds X 4 (05/24/17 2158)  Length of Weaning Trial Length of Weaning Trial (Hours): 1 hour (05/24/17 2158)        Sputum/Suction   Cough: Productive;Strong (05/25/17 0400)  Sputum Amount: Moderate (05/25/17 0400)  Sputum Color: Clear;White (05/25/17 0400)  Sputum Consistency: Thin (05/25/17 0400)      Events/Summary/Plan:Pt was able to go a full hour on an SBT trial and was able to get parameters. After we got the parameters she kept triggering the apnea settings and we had to put her back on full support.

## 2017-05-25 NOTE — PROGRESS NOTES
[facility] ICU Progress Note      Admit Date: 5/23/2017  ICU Day: 2     Resident(s): Michelle Sun  Attending: SAMAN UMANZOR/ Dr. Siegel     Date & Time:   5/24/2017   9:16 PM       Patient ID:    Name:             Mago Bowman     YOB: 1990  Age:                 26 y.o.  female   MRN:               [id number]    Diagnosis:  Acute respiratory failure (Type III)  Metabolic Encephalopathy   H/o methamphetamine abuse   H/o medication overdose   Schizoaffective disorder   Normocytic anemia       HPI:  26 y.o. female with PMH of amphetamine abuse /IVDA,  History of suicidal attempts. PTSD, schizoaffective disorder, was  brought in by care flight from [facility] for altered level of consciousness secondary to likely medication/polysubstance overdose    Consultants:  PMA: Dr Siegel , Dr Sandy     Interval Events:  Wean off sedation and extubation     PHYSICAL EXAM  Gen: intubated, sedated, follows commands   HEENT: NC/AT, no scleral icterus, no conjunctival injection, mucous membranes pink and moist.  Neck: Supple, central line in place   Cardiac: S1S2, RRR, no m/r/g, no JVD.  Respiratory: Normal effort, symmetrical, CTA b/l.  Abdomen: BS+, soft, NT/ND, no rebound/guarding, no palpable organomegaly.  Ext: No edema, 2+ DP pulses b/l. Multiple cut marks over left arm, forearm, lower extremity   Skin: Warm, dry.  Neuro: intubated, follows commands, no focal deficits     Respiratory:  Key Vent Mode: Spont  Respiration: 15, Pulse Oximetry: 100 %    Chest Tube Drains:    Recent Labs      05/23/17   1424  05/24/17   0505   ISTATAPH  7.321*  7.336*   ISTATAPCO2  41.1*  41.2*   ISTATAPO2  138*  122*   ISTATATCO2  22  23   [id number]  99  98   ISTATARTHCO3  21.2  22.0   ISTATARTBE  -5*  -4   ISTATTEMP  98.0 F  36.9 C   ISTATFIO2  40  35   ISTATSPEC  Arterial  Arterial   ISTATAPHTC  7.326*  7.338*   [id number]  136*  121*       HemoDynamics:  Pulse: (!) 114, Heart Rate (Monitored): (!) 117 NIBP: 115/74 mmHg         Fluids:    Date 17 0700 - 17 0659   Shift 8903-7148 7051-1052 1159-1933 24 Hour Total   I  N  T  A  K  E   I.V. 1143.5 531.8  1675.3      Propofol Volume 83.5 31.8  115.3      IV Volume (NS) 60   60      IV Volume (D5 1/2NS 20KCL) 1000 500  1500    Shift Total 1143.5 531.8  1675.3   O  U  T  P  U  T   Urine 1050 600  1650      Indwelling Cathether 1050 600  1650    Shift Total 1050 600  1650   NET 93.5 -68.2  25.3        Intake/Output Summary (Last 24 hours) at 17 2116  Last data filed at 17 1800   Gross per 24 hour   Intake 3586.49 ml   Output   2815 ml   Net 771.49 ml       Weight: 64.1 kg (141 lb 5 oz)  Body mass index is 23.52 kg/(m^2).    Recent Labs      17   1410  17   1855  17   0510   SODIUM  143  142  140   POTASSIUM  3.6  3.5*  3.7   CHLORIDE  116*  118*  114*   CO2  21  20  22   BUN  7*  5*  4*   CREATININE  0.59  0.56  0.59   MAGNESIUM  1.9   --   1.8   PHOSPHORUS  3.4   --   3.4   CALCIUM  9.0  8.5  8.8       GI/Nutrition:  Recent Labs      17   1410  17   1855  17   0510   ALTSGPT  7   --    --    ASTSGOT  12   --    --    ALKPHOSPHAT  62   --    --    TBILIRUBIN  1.0   --    --    GLUCOSE  99  74  93       Heme:  Recent Labs      17   1410  17   0510   RBC  3.49*  3.34*   HEMOGLOBIN  10.4*  9.9*   HEMATOCRIT  32.1*  31.7*   PLATELETCT  223  195   PROTHROMBTM  15.4*   --    APTT  20.2*   --    INR  1.18*   --        Infectious Disease:  Temp  Av.8 °C (98.3 °F)  Min: 36.6 °C (97.9 °F)  Max: 37.2 °C (99 °F)  Monitored Temp  Av.2 °C (97.2 °F)  Min: 36.2 °C (97.2 °F)  Max: 36.3 °C (97.3 °F)  Recent Labs      17   1410  17   0510   WBC  5.4  5.2   NEUTSPOLYS  55.40  52.50   LYMPHOCYTES  31.30  24.60   MONOCYTES  10.10  16.00*   EOSINOPHILS  2.40  5.90   BASOPHILS  0.60  0.60   ASTSGOT  12   --    ALTSGPT  7   --    ALKPHOSPHAT  62   --    TBILIRUBIN  1.0   --        Meds:  • enoxaparin (LOVENOX) injection   40 mg     • quetiapine  50 mg     • haloperidol lactate  1-5 mg     • Respiratory Care per Protocol       • senna-docusate  2 Tab      And   • polyethylene glycol/lytes  1 Packet      And   • magnesium hydroxide  30 mL      And   • bisacodyl  10 mg     • chlorhexidine  15 mL     • lidocaine  1-2 mL     • MD ALERT...Adult ICU Electrolyte Replacement per Pharmacy Protocol       • fentaNYL  25 mcg      Or   • fentaNYL  50 mcg      Or   • fentaNYL  100 mcg     • ipratropium-albuterol  3 mL     • ipratropium-albuterol  3 mL     • famotidine  20 mg      Or   • famotidine  20 mg     • insulin lispro  1-6 Units     • glucose 4 g  16 g      And   • dextrose 50%  25 mL Stopped (05/23/17 2128)   • propofol  5-80 mcg/kg/min Stopped (05/24/17 1815)   • thiamine ivpb  100 mg 100 mg (05/23/17 2141)   • dextrose 5 % and 0.45 % NaCl with KCl 20 mEq   125 mL/hr at 05/24/17 1600        Problem and Plan:    Acute respiratory failure type 3   Medication overdose   Schizoaffective disorder   H/o suicidal ideation   - patient was intubated for airway protection from medication overdose   - UA positive for meth   - when off sedation she gets agitated   - Qtc within normal limits   - extubation as soon as sedation is off   - seroquel and haldol for agitation   - consult psychiatry after she is stable   SW consult placed     DISPO: ICU, transfer to floor tomorrow     CODE STATUS: full code     Quality Measures:  Mckenzie Catheter:yes   DVT Prophylaxis: lovenox   Ulcer Prophylaxis: no   Antibiotics:no   Lines:central line     Procedures:  Central line 5/23/17     Imaging:  DX-ABDOMEN FOR TUBE PLACEMENT   Final Result      Enteric tube placement with the tip projecting over the stomach body.      DX-CHEST-PORTABLE (1 VIEW)   Final Result      No significant change from prior exam.      DX-CHEST-LIMITED (1 VIEW)   Final Result         Line and tube placements as described above. No pneumothorax is noted. No consolidations are identified.       DX-CHEST-PORTABLE (1 VIEW)   Final Result      Clear chest.      OUTSIDE IMAGES-DX CHEST   Preliminary Result      OUTSIDE IMAGES-CT HEAD   Preliminary Result      OUTSIDE IMAGES-DX CHEST   Preliminary Result      DX-CHEST-PORTABLE (1 VIEW)    (Results Pending)

## 2017-05-25 NOTE — PROGRESS NOTES
Dr. Jefferson came by to see the patient after being told by respiratory that patient was lethargic and apneic on the vent. He agreed that patient was lethargic. No orders given on extubation at this time

## 2017-05-25 NOTE — PROGRESS NOTES
MD Siegel at bedside. Pt awake, lethargic, following commands and nodding appropriately. Education provided regarding extubation and post extubation care. Pt nodding yes in agreement to educated topics. Extubation performed without incident. Pt placed on 2 L 02 via NC with saturations %, continuous pulse oximetry in place. VSS, no complaints of pain at this time. Safety precautions in place, call light and personal belongings within reach, bed in low position, hourly rounding in place.

## 2017-05-25 NOTE — PROGRESS NOTES
Pulmonary Critical Care Progress Note        DOS:  5/25/2017    Chief Complaint: drug overdose     History of Present Illness:  26 y.o. female with PMH of amphetamine abuse /IVDA,  History of suicidal attempts. PTSD, schizoaffective disorder, was  brought in by care flight from [facility] for altered level of consciousness secondary to likely medication/polysubstance overdose. History obtained from MR, as pt is unresponsive. Per EMS, the patient was found down outside of medical clinic where she was noted to be somnolent and intermittently unresponsive, with intermittent lateral gaze palsy. She  was given 1 mg of Narcan probably with no good response,  And patient ended up being intubated to protect airways, using etomidate, succinylcholine, rocuronium, and Versed. She got 1 L NS outside and given 1 more L of NS at [facility] ED.      ROS:  Respiratory: unable to perform due to the patient's inability to effectively communicate, Cardiac: unable to perform due to the patient's inability to effectively communicate,   GI: unable to perform due to the patient's inability to effectively communicate.      Interval Events:  24 hour interval history reviewed   Prop off this morning ---->15  Follows, agitated  Lots of secretions  SR, BP noted  BM PTA  I/O's +264cc/24hrs  Tmax 98.8  SBT this morning two hours on 5/8; RSBI 16, CV 3L, NIF -27  CXR shows: Clear    PFSH:  No change.    Respiratory:  Key Vent Mode: APVCMV, Rate (breaths/min): 10, Vt Target (mL): 370, PEEP/CPAP: 8, FiO2: 35, Static Compliance (ml / cm H2O): 128, Control VTE (exp VT): 360  Pulse Oximetry: 100 %   Exam: No distress on SBT. Lungs clear with few coarse rhonchi. Nods approval to pulling ET tube  ImagingAvailable data reviewed   CXR reviewed with team.       Recent Labs      05/23/17   1424  05/24/17   0505  05/25/17   0446   ISTATAPH  7.321*  7.336*  7.391*   ISTATAPCO2  41.1*  41.2*  39.3*   ISTATAPO2  138*  122*  136*   ISTATATCO2  22  23  25    [id number]  99  98  99   ISTATARTHCO3  21.2  22.0  23.8   ISTATARTBE  -5*  -4  -1   ISTATTEMP  98.0 F  36.9 C  38.1 C   ISTATFIO2  40  35  35   ISTATSPEC  Arterial  Arterial  Arterial   ISTATAPHTC  7.326*  7.338*  7.375*   [id number]  136*  121*  143*       HemoDynamics:  Pulse: (!) 103, Heart Rate (Monitored): 99  NIBP: 120/83 mmHg     Exam:  ST, RR, no murmur, no edema, good perfusion. No change  Imaging: Available data reviewed     Recent Labs      05/23/17   1410  05/23/17   1855  05/23/17   2130   CPKTOTAL   --   53  47   TROPONINI  <0.01   --    --    BNPBTYPENAT  7   --    --        Neuro:  GCS Total [place] Coma Score: 11  Exam: Awake and alert, PERRL, restrained, NFE, no meningeal signs  Imaging: Available data reviewed    Fluids:  Intake/Output       05/23/17 0700 - 05/24/17 0659 05/24/17 0700 - 05/25/17 0659 05/25/17 0700 - 05/26/17 0659      5660-0054 1792-2173 Total 6885-1555 8627-3225 Total 8545-0476 7063-2719 Total       Intake    I.V.  --  2181.3 2181.3  1675.3  1020.8 2696.1  --  -- --    Propofol Volume -- 221.3 221.3 115.3 20.8 136.1 -- -- --    IV Volume (NS) -- 885 885 60 -- 60 -- -- --    IV Volume (D5 1/2NS 20KCL) --  1000 2500 -- -- --    IV Piggyback Volume (IV Piggyback) -- 200 200 -- -- -- -- -- --    Enteral  --  90 90  --  -- --  --  -- --    Free Water / Tube Flush -- 90 90 -- -- -- -- -- --    Total Intake -- 2271.3 2271.3 1675.3 1020.8 2696.1 -- -- --       Output    Urine  --  2065 2065 1650  700 2350  --  -- --    Indwelling Cathether -- 2065 2065 1650 700 2350 -- -- --    Drains  --  200 200  --  -- --  --  -- --    Oral Gastric Tube -- 200 200 -- -- -- -- -- --    Stool  --  -- --  --  -- --  --  -- --    Number of Times Stooled -- 0 x 0 x -- -- -- -- -- --    Total Output -- 2265 2265 1650 700 2350 -- -- --       Net I/O     -- 6.3 6.3 25.3 320.8 346.1 -- -- --           Recent Labs      05/23/17   1410  05/23/17   1855  05/24/17   0510  05/25/17   0512   SODIUM   143  142  140  139   POTASSIUM  3.6  3.5*  3.7  3.5*   CHLORIDE  116*  118*  114*  110   CO2  21  20  22  24   BUN  7*  5*  4*  2*   CREATININE  0.59  0.56  0.59  0.56   MAGNESIUM  1.9   --   1.8  1.9   PHOSPHORUS  3.4   --   3.4  3.8   CALCIUM  9.0  8.5  8.8  8.6       GI/Nutrition:  Exam: Flat, soft, NT/ND, + Bowel sounds  Imaging: Available data reviewed  NPO     Liver Function  Recent Labs      17   1410  17   1855  17   0510  17   ALTSGPT  7   --    --    --    ASTSGOT  12   --    --    --    ALKPHOSPHAT  62   --    --    --    TBILIRUBIN  1.0   --    --    --    GLUCOSE  99  74  93  153*       Heme:  Recent Labs      17   RBC  3.49*  3.34*  3.51*   HEMOGLOBIN  10.4*  9.9*  10.5*   HEMATOCRIT  32.1*  31.7*  32.3*   PLATELETCT  223  195  213   PROTHROMBTM  15.4*   --    --    APTT  20.2*   --    --    INR  1.18*   --    --        Infectious Disease:  Temp  Av.9 °C (98.4 °F)  Min: 36.7 °C (98 °F)  Max: 37.1 °C (98.8 °F)  Micro: reviewed     Recent Labs      17   14117   0510  05/25/17   0512   WBC  5.4  5.2  6.9   NEUTSPOLYS  55.40  52.50  68.30   LYMPHOCYTES  31.30  24.60  14.70*   MONOCYTES  10.10  16.00*  14.40*   EOSINOPHILS  2.40  5.90  1.60   BASOPHILS  0.60  0.60  0.70   ASTSGOT  12   --    --    ALTSGPT  7   --    --    ALKPHOSPHAT  62   --    --    TBILIRUBIN  1.0   --    --      Current Facility-Administered Medications   Medication Dose Frequency Provider Last Rate Last Dose   • enoxaparin (LOVENOX) inj 40 mg  40 mg DAILY Michelle Sun M.D.   40 mg at 17 1147   • quetiapine (SEROQUEL) tablet 50 mg  50 mg TID Pierce Siegel M.D.   50 mg at 17 210   • haloperidol lactate (HALDOL) injection 1-5 mg  1-5 mg Q4HRS PRN Pierce Siegel M.D.       • Respiratory Care per Protocol   Continuous RT Angelo Sandy M.D.       • senna-docusate (PERICOLACE or SENOKOT S) 8.6-50 MG per tablet 2 Tab   2 Tab BID Angelo Sandy M.D.   2 Tab at 05/24/17 2103    And   • polyethylene glycol/lytes (MIRALAX) PACKET 1 Packet  1 Packet QDAY PRN Angelo Sandy M.D.        And   • magnesium hydroxide (MILK OF MAGNESIA) suspension 30 mL  30 mL QDAY PRN Angelo Sandy M.D.        And   • bisacodyl (DULCOLAX) suppository 10 mg  10 mg QDAY PRN Angelo Sandy M.D.       • chlorhexidine (PERIDEX) 0.12 % solution 15 mL  15 mL BID Angelo Sandy M.D.   15 mL at 05/24/17 2103   • lidocaine (XYLOCAINE) 1%  injection  1-2 mL Q30 MIN PRN Angelo Sandy M.D.   Stopped at 05/24/17 1838   • MD ALERT...Adult ICU Electrolyte Replacement per Pharmacy Protocol   pharmacy to dose Angelo Sandy M.D.       • fentaNYL (SUBLIMAZE) injection 25 mcg  25 mcg Q HOUR PRN Angelo Sandy M.D.        Or   • fentaNYL (SUBLIMAZE) injection 50 mcg  50 mcg Q HOUR PRN Angelo Sandy M.D.   50 mcg at 05/24/17 1253    Or   • fentaNYL (SUBLIMAZE) injection 100 mcg  100 mcg Q HOUR PRN Angelo Sandy M.D.       • ipratropium-albuterol (DUONEB) nebulizer solution 3 mL  3 mL Q2HRS PRN (RT) Angelo Sandy M.D.       • ipratropium-albuterol (DUONEB) nebulizer solution 3 mL  3 mL Q4HRS (RT) Angelo Sandy M.D.   3 mL at 05/25/17 0229   • famotidine (PEPCID) tablet 20 mg  20 mg Q12HRS Angelo Sandy M.D.        Or   • famotidine (PEPCID) injection 20 mg  20 mg Q12HRS Angelo Sandy M.D.   20 mg at 05/24/17 2102   • insulin lispro (HUMALOG) injection 1-6 Units  1-6 Units Q6HRS Angelo Sandy M.D.   Stopped at 05/23/17 1840   • glucose 4 g chewable tablet 16 g  16 g Q15 MIN PRN Angelo Sandy M.D.        And   • dextrose 50% (D50W) injection 25 mL  25 mL Q15 MIN PRN Angelo Sandy M.D.   Stopped at 05/23/17 2128   • propofol (DIPRIVAN) injection  5-80 mcg/kg/min Continuous Angelo Sandy M.D. 12.6 mL/hr at 05/25/17 0525 35 mcg/kg/min at 05/25/17 0525   • thiamine (B-1) 100 mg in D5W 100 mL IVPB  100 mg DAILY Kash DICKENS M.D. 200  mL/hr at 05/23/17 2141 100 mg at 05/23/17 2141   • dextrose 5 % and 0.45 % NaCl with KCl 20 mEq   Continuous Kash DICKENS M.D. 125 mL/hr at 05/24/17 1600       Last reviewed on 5/23/2017  3:56 PM by Delia Vo PhT    Quality  Measures:  Labs reviewed, Radiology images reviewed, Medications reviewed and EKG reviewed  Mckenzie catheter: Critically Ill - Requiring Accurate Measurement of Urinary Output and Unconscious / Sedated Patient on a Ventilator      DVT Prophylaxis: Heparin  DVT prophylaxis - mechanical: SCDs  Ulcer prophylaxis: Yes    Assessed for rehab: Patient unable to tolerate rehabilitation therapeutic regimen      Problems/plan:  Acute hypoxemic respiratory failure - not protecting airway   Intubated 5/23 in ER   SBTs failed last night - apnea - trying again this AM - ready for extubation trial?   RT protocols   Sedation vacations/Mobilize  Altered LOC - CT head neg,    Utox + meth/benzo, marginally elevated Li,    aceta/salycilate levels wnl, etoh wnl, afebrile,    current neuro exam remains non focal, wbc and lactate wnl - neck supple  H/o Chronic Meth Use - + on u tox today  Medical Noncompliance with Psych meds    - mother states quit taking few days ago and she kicked her out 2 days ago  Schizoaffective Disorder - Seroquel/Li+  PTSD  Hx of SI/SA  Anemai - no clinical bleeding   Follow - transfuse PRN  ERP  Recent Admit 4/15-21 with similar ALOC and polysubstance abuse  Enteral nutrition via Cortrak if not extubated today  Prophylaxis    Plan:  Home dose Seroquel - current dose increased  Wean Prop asap  SBT - Extubate  Legal hold?  Psych eval when LOC appropriate    Discussed patient condition and risk of morbidity and/or mortality with RN, RT, Pharmacy, [facility] resident and Charge nurse / hot rounds.    The patient remains critically ill.  Critical care time = 32 minutes in directly providing and coordinating critical care and extensive data review.  No time overlap and excludes  procedures.    IKrista (Je), am scribing for, and in the presence of, Pierce Siegel M.D.  Electronically signed by: Krista Fagan (Je), 5/25/2017  IPierce M.D. personally performed the services described in this documentation, as scribed by Krista Fagan in my presence, and it is both accurate and complete.

## 2017-05-25 NOTE — PROGRESS NOTES
[facility] ICU Progress Note      Admit Date: 5/23/2017  ICU Day: 3     Resident(s): Michelle Sun  Attending: SAMAN UMANZOR/ Dr. Siegel     Date & Time:   5/25/2017   4:24 PM       Patient ID:    Name:             Mago Bowman     YOB: 1990  Age:                 26 y.o.  female   MRN:               [id number]    Diagnosis:  Acute hypoxic respiratory failure   Metabolic Encephalopathy   H/o methamphetamine abuse   H/o medication overdose   Schizoaffective disorder   Normocytic anemia   Hypokalemia     HPI:  26 y.o. female with PMH of amphetamine abuse /IVDA,  History of suicidal attempts. PTSD, schizoaffective disorder, was  brought in by care flight from [facility] for altered level of consciousness secondary to likely medication/polysubstance overdose    Consultants:  PMA: Dr Siegel , Dr Sandy     Interval Events:  Extubated, tolerated well.   Transfer to floor when stable.   One spike of fever with no increase in white count, CXR negative. On tylenol.     PHYSICAL EXAM  Gen: intubated, sedated, follows commands   HEENT: NC/AT, no scleral icterus, no conjunctival injection, mucous membranes pink and moist.  Neck: Supple, central line in place   Cardiac: S1S2, RRR, no m/r/g, no JVD.  Respiratory: Normal effort, symmetrical, CTA b/l.  Abdomen: BS+, soft  Ext: No edema, 2+ DP pulses b/l. Multiple cut marks over left arm, forearm, lower extremity   Skin: Warm, dry  Neuro: extubated, follows commands     Respiratory:  Key Vent Mode: Spont  Respiration: 13, Pulse Oximetry: 100 %, O2 Daily Delivery Respiratory : Room Air with O2 Available    Chest Tube Drains:    Recent Labs      05/23/17   1424  05/24/17   0505  05/25/17   0446   ISTATAPH  7.321*  7.336*  7.391*   ISTATAPCO2  41.1*  41.2*  39.3*   ISTATAPO2  138*  122*  136*   ISTATATCO2  22  23  25   [id number]  99  98  99   ISTATARTHCO3  21.2  22.0  23.8   ISTATARTBE  -5*  -4  -1   ISTATTEMP  98.0 F  36.9 C  38.1 C   ISTATFIO2  40  35  35    ISTATSPEC  Arterial  Arterial  Arterial   ISTATAPHTC  7.326*  7.338*  7.375*   [id number]  136*  121*  143*       HemoDynamics:  Pulse: 93, Heart Rate (Monitored): 94 NIBP: 135/81 mmHg        Fluids:    Date 17 0700 - 17 0659   Shift 9728-2595 4096-6771 8098-7351 24 Hour Total   I  N  T  A  K  E   Shift Total       O  U  T  P  U  T   Urine 550   550      Indwelling Cathether 550   550    Shift Total 550   550   NET -550   -550        Intake/Output Summary (Last 24 hours) at 17  Last data filed at 17 1800   Gross per 24 hour   Intake 3586.49 ml   Output   2815 ml   Net 771.49 ml       Weight: 63 kg (138 lb 14.2 oz)  Body mass index is 23.11 kg/(m^2).    Recent Labs      17   1410  17   18517   0510  17   0512   SODIUM  143  142  140  139   POTASSIUM  3.6  3.5*  3.7  3.5*   CHLORIDE  116*  118*  114*  110   CO2  21  20  22  24   BUN  7*  5*  4*  2*   CREATININE  0.59  0.56  0.59  0.56   MAGNESIUM  1.9   --   1.8  1.9   PHOSPHORUS  3.4   --   3.4  3.8   CALCIUM  9.0  8.5  8.8  8.6       GI/Nutrition:  Recent Labs      17   14117   18517   0510  17   0512   ALTSGPT  7   --    --    --    ASTSGOT  12   --    --    --    ALKPHOSPHAT  62   --    --    --    TBILIRUBIN  1.0   --    --    --    GLUCOSE  99  74  93  153*       Heme:  Recent Labs      17   14117   0510  17   0512   RBC  3.49*  3.34*  3.51*   HEMOGLOBIN  10.4*  9.9*  10.5*   HEMATOCRIT  32.1*  31.7*  32.3*   PLATELETCT  223  195  213   PROTHROMBTM  15.4*   --    --    APTT  20.2*   --    --    INR  1.18*   --    --        Infectious Disease:  Temp  Av.6 °C (99.7 °F)  Min: 36.7 °C (98 °F)  Max: 38.9 °C (102 °F)  Recent Labs      17   1410  17   0510  17   0512   WBC  5.4  5.2  6.9   NEUTSPOLYS  55.40  52.50  68.30   LYMPHOCYTES  31.30  24.60  14.70*   MONOCYTES  10.10  16.00*  14.40*   EOSINOPHILS  2.40  5.90  1.60   BASOPHILS   0.60  0.60  0.70   ASTSGOT  12   --    --    ALTSGPT  7   --    --    ALKPHOSPHAT  62   --    --    TBILIRUBIN  1.0   --    --        Meds:  • acetaminophen  650 mg     • quetiapine  100 mg     • enoxaparin (LOVENOX) injection  40 mg     • haloperidol lactate  1-5 mg     • Respiratory Care per Protocol       • senna-docusate  2 Tab      And   • polyethylene glycol/lytes  1 Packet      And   • magnesium hydroxide  30 mL      And   • bisacodyl  10 mg     • chlorhexidine  15 mL     • lidocaine  1-2 mL     • MD ALERT...Adult ICU Electrolyte Replacement per Pharmacy Protocol       • fentaNYL  25 mcg      Or   • fentaNYL  50 mcg      Or   • fentaNYL  100 mcg     • ipratropium-albuterol  3 mL     • famotidine  20 mg     • insulin lispro  1-6 Units     • glucose 4 g  16 g      And   • dextrose 50%  25 mL Stopped (05/23/17 6153)   • propofol  5-80 mcg/kg/min Stopped (05/25/17 8867)   • thiamine ivpb  100 mg 100 mg (05/23/17 9560)   • dextrose 5 % and 0.45 % NaCl with KCl 20 mEq   125 mL/hr at 05/24/17 1600        Problem and Plan:    Acute hypoxic respiratory failure - resolved   Medication overdose   Schizoaffective disorder   H/o suicidal ideation   - patient was intubated for airway protection from medication overdose   - UDS outside facility positive for meth   - Qtc within normal limits, monitored with serial EKG's   - seroquel dose increased to 100 mg TID   - haldol for agitation   - consult psychiatry after she is stable   - SW consult placed     DISPO: ICU, transfer to floor whenever stable     CODE STATUS: full code     Quality Measures:  Mckenzie Catheter:yes   DVT Prophylaxis: lovenox   Ulcer Prophylaxis: no   Antibiotics:no   Lines:central line     Procedures:  Central line 5/23/17   Extubation 5/25/17     Imaging:  DX-CHEST-PORTABLE (1 VIEW)   Final Result      No radiographic evidence of acute cardiopulmonary process.      DX-ABDOMEN FOR TUBE PLACEMENT   Final Result      Enteric tube placement with the tip  projecting over the stomach body.      DX-CHEST-PORTABLE (1 VIEW)   Final Result      No significant change from prior exam.      DX-CHEST-LIMITED (1 VIEW)   Final Result         Line and tube placements as described above. No pneumothorax is noted. No consolidations are identified.      DX-CHEST-PORTABLE (1 VIEW)   Final Result      Clear chest.      OUTSIDE IMAGES-DX CHEST   Preliminary Result      OUTSIDE IMAGES-CT HEAD   Preliminary Result      OUTSIDE IMAGES-DX CHEST   Preliminary Result

## 2017-05-25 NOTE — RESPIRATORY CARE
Ventilator Weaning Update    Patient is on vent day 2.  SBT was tolerated for a minimum of 1 hour hours on settings of 5/8    Wean parameters for this SBT were:  #FVC / Vital Capacity (liters) : 2.8 (05/24/17 2155)  NIF (cm H2O) : -41.2 (05/24/17 2155)  Rapid Shallow Breathing Index (RR/VT): 41 (05/24/17 2155)  RR (bpm): 10 (05/24/17 2155)  Spontaneous VE: 5 (05/24/17 2155)  Spontaneous VT: 409 (05/24/17 2155)       Weaning Trial Stopped due to:: Apnea > 30 seconds X 4

## 2017-05-26 ENCOUNTER — APPOINTMENT (OUTPATIENT)
Dept: RADIOLOGY | Facility: MEDICAL CENTER | Age: 27
DRG: 917 | End: 2017-05-26
Attending: INTERNAL MEDICINE
Payer: COMMERCIAL

## 2017-05-26 PROBLEM — Z91.51 HISTORY OF ATTEMPTED SUICIDE: Status: RESOLVED | Noted: 2017-05-25 | Resolved: 2017-05-26

## 2017-05-26 PROBLEM — T50.901A: Status: ACTIVE | Noted: 2017-05-26

## 2017-05-26 PROBLEM — R50.9 FEVER: Status: RESOLVED | Noted: 2017-05-25 | Resolved: 2017-05-26

## 2017-05-26 PROBLEM — F31.9 BIPOLAR 1 DISORDER (HCC): Status: ACTIVE | Noted: 2017-05-26

## 2017-05-26 PROBLEM — E87.6 HYPOKALEMIA: Status: RESOLVED | Noted: 2017-05-25 | Resolved: 2017-05-26

## 2017-05-26 PROBLEM — F15.10 METHAMPHETAMINE ABUSE (HCC): Status: ACTIVE | Noted: 2017-05-26

## 2017-05-26 PROBLEM — F43.10 PTSD (POST-TRAUMATIC STRESS DISORDER): Status: ACTIVE | Noted: 2017-05-26

## 2017-05-26 PROBLEM — G93.40 ACUTE ENCEPHALOPATHY: Status: RESOLVED | Noted: 2017-05-25 | Resolved: 2017-05-26

## 2017-05-26 LAB
ANION GAP SERPL CALC-SCNC: 5 MMOL/L (ref 0–11.9)
BASOPHILS # BLD AUTO: 0.7 % (ref 0–1.8)
BASOPHILS # BLD: 0.04 K/UL (ref 0–0.12)
BUN SERPL-MCNC: 4 MG/DL (ref 8–22)
CALCIUM SERPL-MCNC: 8.8 MG/DL (ref 8.5–10.5)
CHLORIDE SERPL-SCNC: 111 MMOL/L (ref 96–112)
CO2 SERPL-SCNC: 25 MMOL/L (ref 20–33)
CREAT SERPL-MCNC: 0.59 MG/DL (ref 0.5–1.4)
EOSINOPHIL # BLD AUTO: 0.34 K/UL (ref 0–0.51)
EOSINOPHIL NFR BLD: 6.1 % (ref 0–6.9)
ERYTHROCYTE [DISTWIDTH] IN BLOOD BY AUTOMATED COUNT: 50.4 FL (ref 35.9–50)
GFR SERPL CREATININE-BSD FRML MDRD: >60 ML/MIN/1.73 M 2
GLUCOSE BLD-MCNC: 101 MG/DL (ref 65–99)
GLUCOSE BLD-MCNC: 93 MG/DL (ref 65–99)
GLUCOSE SERPL-MCNC: 102 MG/DL (ref 65–99)
HCT VFR BLD AUTO: 34.2 % (ref 37–47)
HGB BLD-MCNC: 11.1 G/DL (ref 12–16)
IMM GRANULOCYTES # BLD AUTO: 0.01 K/UL (ref 0–0.11)
IMM GRANULOCYTES NFR BLD AUTO: 0.2 % (ref 0–0.9)
LYMPHOCYTES # BLD AUTO: 1.27 K/UL (ref 1–4.8)
LYMPHOCYTES NFR BLD: 22.7 % (ref 22–41)
MAGNESIUM SERPL-MCNC: 2 MG/DL (ref 1.5–2.5)
MCH RBC QN AUTO: 30 PG (ref 27–33)
MCHC RBC AUTO-ENTMCNC: 32.5 G/DL (ref 33.6–35)
MCV RBC AUTO: 92.4 FL (ref 81.4–97.8)
MONOCYTES # BLD AUTO: 0.88 K/UL (ref 0–0.85)
MONOCYTES NFR BLD AUTO: 15.7 % (ref 0–13.4)
NEUTROPHILS # BLD AUTO: 3.05 K/UL (ref 2–7.15)
NEUTROPHILS NFR BLD: 54.6 % (ref 44–72)
NRBC # BLD AUTO: 0 K/UL
NRBC BLD AUTO-RTO: 0 /100 WBC
PHOSPHATE SERPL-MCNC: 3.2 MG/DL (ref 2.5–4.5)
PLATELET # BLD AUTO: 218 K/UL (ref 164–446)
PMV BLD AUTO: 9.2 FL (ref 9–12.9)
POTASSIUM SERPL-SCNC: 3.8 MMOL/L (ref 3.6–5.5)
RBC # BLD AUTO: 3.7 M/UL (ref 4.2–5.4)
SODIUM SERPL-SCNC: 141 MMOL/L (ref 135–145)
WBC # BLD AUTO: 5.6 K/UL (ref 4.8–10.8)

## 2017-05-26 PROCEDURE — 85025 COMPLETE CBC W/AUTO DIFF WBC: CPT

## 2017-05-26 PROCEDURE — A9270 NON-COVERED ITEM OR SERVICE: HCPCS | Performed by: PHARMACIST

## 2017-05-26 PROCEDURE — 700102 HCHG RX REV CODE 250 W/ 637 OVERRIDE(OP): Performed by: INTERNAL MEDICINE

## 2017-05-26 PROCEDURE — 83735 ASSAY OF MAGNESIUM: CPT

## 2017-05-26 PROCEDURE — 71010 DX-CHEST-PORTABLE (1 VIEW): CPT

## 2017-05-26 PROCEDURE — A9270 NON-COVERED ITEM OR SERVICE: HCPCS | Performed by: HOSPITALIST

## 2017-05-26 PROCEDURE — A9270 NON-COVERED ITEM OR SERVICE: HCPCS | Performed by: INTERNAL MEDICINE

## 2017-05-26 PROCEDURE — 82962 GLUCOSE BLOOD TEST: CPT | Mod: 91

## 2017-05-26 PROCEDURE — 99233 SBSQ HOSP IP/OBS HIGH 50: CPT | Performed by: INTERNAL MEDICINE

## 2017-05-26 PROCEDURE — 770006 HCHG ROOM/CARE - MED/SURG/GYN SEMI*

## 2017-05-26 PROCEDURE — 700101 HCHG RX REV CODE 250: Performed by: HOSPITALIST

## 2017-05-26 PROCEDURE — 700111 HCHG RX REV CODE 636 W/ 250 OVERRIDE (IP): Performed by: HOSPITALIST

## 2017-05-26 PROCEDURE — 700102 HCHG RX REV CODE 250 W/ 637 OVERRIDE(OP): Performed by: PHARMACIST

## 2017-05-26 PROCEDURE — 80048 BASIC METABOLIC PNL TOTAL CA: CPT

## 2017-05-26 PROCEDURE — 700102 HCHG RX REV CODE 250 W/ 637 OVERRIDE(OP): Performed by: HOSPITALIST

## 2017-05-26 PROCEDURE — 84100 ASSAY OF PHOSPHORUS: CPT

## 2017-05-26 RX ORDER — QUETIAPINE FUMARATE 100 MG/1
200 TABLET, FILM COATED ORAL 2 TIMES DAILY
Status: DISCONTINUED | OUTPATIENT
Start: 2017-05-27 | End: 2017-05-30 | Stop reason: HOSPADM

## 2017-05-26 RX ORDER — THIAMINE MONONITRATE (VIT B1) 100 MG
100 TABLET ORAL DAILY
Status: DISCONTINUED | OUTPATIENT
Start: 2017-05-26 | End: 2017-05-30 | Stop reason: HOSPADM

## 2017-05-26 RX ORDER — POTASSIUM CHLORIDE 1.5 G/1.58G
20 POWDER, FOR SOLUTION ORAL ONCE
Status: COMPLETED | OUTPATIENT
Start: 2017-05-26 | End: 2017-05-26

## 2017-05-26 RX ADMIN — POTASSIUM CHLORIDE 20 MEQ: 1.5 POWDER, FOR SOLUTION ORAL at 10:26

## 2017-05-26 RX ADMIN — POTASSIUM CHLORIDE, DEXTROSE MONOHYDRATE AND SODIUM CHLORIDE: 150; 5; 450 INJECTION, SOLUTION INTRAVENOUS at 05:56

## 2017-05-26 RX ADMIN — POTASSIUM CHLORIDE, DEXTROSE MONOHYDRATE AND SODIUM CHLORIDE: 150; 5; 450 INJECTION, SOLUTION INTRAVENOUS at 19:58

## 2017-05-26 RX ADMIN — ENOXAPARIN SODIUM 40 MG: 100 INJECTION SUBCUTANEOUS at 10:25

## 2017-05-26 RX ADMIN — STANDARDIZED SENNA CONCENTRATE AND DOCUSATE SODIUM 2 TABLET: 8.6; 5 TABLET, FILM COATED ORAL at 10:25

## 2017-05-26 RX ADMIN — Medication 100 MG: at 10:24

## 2017-05-26 RX ADMIN — QUETIAPINE FUMARATE 100 MG: 100 TABLET ORAL at 10:25

## 2017-05-26 ASSESSMENT — PAIN SCALES - GENERAL
PAINLEVEL_OUTOF10: 0

## 2017-05-26 NOTE — DISCHARGE PLANNING
Order received from psych to petition for civil commitment and extend legal hold.  Notified Psych team patient is not on legal hold at this time and POC is for IP DC to [facility] for continued Polysubstance abuse support and wrap around  support for co-occuring stabilization.  MH/SA.   Patient admitted for polysubstance abuse overdose.      Now extubated.   Waiting on advisement from Psych Dr Arroyo on whether patient needs legal hold.

## 2017-05-26 NOTE — PSYCHIATRY
"PSYCHIATRIC CONSULTATION:  Reason for admission: Methamphetamine and benzodiazepine overdose  Reason for consult: Possible Suicidal Ideation, h/o bipolar disorder  Requesting Physician: Michelle Sun MD  Supervising Physician: Elizabeth Arroyo MD    Legal status: Extend hold    Chief Complaint: Methamphetamine and benzodiazepine overdose    HPI: Patient was found down on 5/23/17 in [place], [place] where she lives. UDS at [facility] positive for amphetamines, benzodiazepines, serum lithium mildly elevated. She states she and her doctor in [place] agreed to d/c her Haldol as she was experiencing side effects, including \"drooling, slurring, not able to concentrate.\" Her mother thought she was refusing to take the Haldol not as a joint medical decision with her physician, but by her own decision. Her mother kicked her out of the house and patient states she went to go stay with a friend that she uses methamphetamines with. Patient was using meth for 4 days before she was found unconscious. She says when she is using meth, she cannot make good decisions and was too \"out of it\" to know what she was doing when she took the benzodiazepine. She says she was not trying to hurt herself or kill herself, it was a mistake that she now feels \"embarrassed\" about. Patient states her only goal now is to get clean and get her \"life on track.\" She is very focused on going to [facility] for \"dual diagnosis treatment.\" She is very upset that she has to miss her brother's high school graduation today and began to cry.     On return with Dr. Arroyo, patient was sitting with boyfriend, Kaleb, in hypersexual manner with her legs on him. Patient showed lowered boundaries by allowing aKleb to stay in the room while the Psych team talked with her. She stated she took Seroquel, lithium, Valium, and Haldol as part of her overdose. Patient wanted her boyfriend to become her [facility], counseled her on using a payee company instead " "to avoid conflicts of interest. REHANA Shetty informed us that patient told her the reason she overdosed was because her boyfriend was supposed to pick her up, but he did not come even after she waited up for him and so she was upset. Patient stated to Sena that she started using meth because of her boyfriend. None of this was told to Psych team by patient.    Psychiatric Review of Systems:current symptoms as reported by pt.  Depression: Denies  Gaby: Denies   Anxiety/Panic Attacks: Denies  PTSD symptom: Denies  Psychosis: Denies hallucinations/delusions     Medical Review of Systems: as reported by pt. All systems reviewed. Only those found to be + are noted below. All others are negative.   Neurological:    TBIs: Denies   SZs: Denies   Strokes: Denies  Other medical symptoms:   Thyroid: Denies   Diabetes: Denies   Cardiovascular disease: Denies    Psychiatric Examination: observed phenomenon:  Vitals: Blood pressure 106/62, pulse 92, temperature 36.7 °C (98.1 °F), resp. rate 13, height 1.651 m (5' 5\"), weight 63 kg (138 lb 14.2 oz), SpO2 98 %.  Musculoskeletal(abnormal movements, gait, etc): Fidgeting with lip balm and phone during conversation.  Appearance: Clean, groomed, appears stated age. Wearing hospital gown, full sleeve tattoo on right arm, tattoos on left shoulder and wrist. Pleasant and cooperative, but seems to have lowered boundaries and hypersexual behavior  Thoughts: Linear, organized  Speech: Spontaneous, regular rate/rhythm, voice hoarse from recent intubation  Mood: Labile  Affect: Full, congruent with mood and content   SI/HI: Denies  Attention/Alertness: Appropriate  Memory: Immediate, short, and long term memory grossly intact.  Orientation: A&Ox4  Fund of Knowledge: Average based on vocabulary and syntax      Insight/Judgement into symptoms: Fair/Poor  Neurological Testing:( ie clock, cube drawing, MMSE, MOCA,etc.): Not tested    Past Psychiatric Hx: Patient has past diagnoses of bipolar " "disorder and PTSD. She reports she lived in a psychiatric hospital from ages [age] for \"what happened to me as a kid.\" She declined to talk about her PTSD and what caused it, becoming anxious and agitated. Patient was hospitalized at [facility] from 4/15-4/21/17 for benzodiazepine overdose and amphetamine use. She says that her mental health was doing well until she started using meth.    Family Psychiatric Hx: Father has bipolar I, but is well-managed on no medications at this time    Social Hx: Patient lives in [place], [place] with her mother and her mother's wife. She says that she and her mother do not get along well \"living under the same roof.\" She is unemployed and receives $895/mos in Social Security. Mother is currently the payee, but she wants to change the payee to her boyfriend, Kaleb. Recommended she use a company as a payee to avoid personal conflicts of interest. She reports that she did not finish school and stopped after the 6th grade. Patient has a younger brother, age 17, who \"is my world,\" but does not live with her. Patient reports previous domestic violence charges against her while she was with her ex-wife, but says they were dropped. Patient plans to live with boyfriend after rehab at [facility].    Drug/Alcohol/Tobacco Hx:   Drugs: Started using methamphetamine 6mos ago, routes include smoking, snorting, and IV. Was clean for 1mos after d/c from [facility] on 4/21/17, but started using again 4 days before current admittance.   Alcohol: Denies   Tobacco: Has smoked 1ok/day for 3yrs    Medical Hx: labs, MARS, medications, etc were reviewed. Only those findings of potential interest to psychiatry are noted below:  Medical Conditions: None  Allergies: Sulfa drugs - gets hypoglycemic  Medications (currently prescribed at [facility]):    Current facility-administered medications:   •  thiamine (THIAMINE) tablet 100 mg, 100 mg, Oral, DAILY, Michelle Sun M.D., 100 mg at 05/26/17 1024  •  acetaminophen (TYLENOL) " tablet 650 mg, 650 mg, Oral, Q4HRS PRN, Michelle Sun M.D., 650 mg at 05/25/17 1135  •  quetiapine (SEROQUEL) tablet 100 mg, 100 mg, Oral, TID, Pierce Siegel M.D., 100 mg at 05/26/17 1025  •  enoxaparin (LOVENOX) inj 40 mg, 40 mg, Subcutaneous, DAILY, Michelle Sun M.D., 40 mg at 05/26/17 1025  •  haloperidol lactate (HALDOL) injection 1-5 mg, 1-5 mg, Intravenous, Q4HRS PRN, Pierce Siegel M.D.  •  Respiratory Care per Protocol, , Nebulization, Continuous RT, Angelo Sandy M.D.  •  senna-docusate (PERICOLACE or SENOKOT S) 8.6-50 MG per tablet 2 Tab, 2 Tab, Oral, BID, 2 Tab at 05/26/17 1025 **AND** polyethylene glycol/lytes (MIRALAX) PACKET 1 Packet, 1 Packet, Oral, QDAY PRN **AND** magnesium hydroxide (MILK OF MAGNESIA) suspension 30 mL, 30 mL, Oral, QDAY PRN **AND** bisacodyl (DULCOLAX) suppository 10 mg, 10 mg, Rectal, QDAY PRN, Angelo Sandy M.D.  •  lidocaine (XYLOCAINE) 1%  injection, 1-2 mL, Tracheal Tube, Q30 MIN PRN, Angelo Sandy M.D., Stopped at 05/24/17 1838  •  MD ALERT...Adult ICU Electrolyte Replacement per Pharmacy Protocol, , Other, pharmacy to dose, Angelo Sandy M.D.  •  ipratropium-albuterol (DUONEB) nebulizer solution 3 mL, 3 mL, Nebulization, Q2HRS PRN (RT), Angelo Sandy M.D.  •  insulin lispro (HUMALOG) injection 1-6 Units, 1-6 Units, Subcutaneous, Q6HRS, Angelo Sandy M.D., Stopped at 05/23/17 1840  •  Action is required: Protocol 1073 Hypoglycemia has been implemented, , , Once **AND** Protocol 1073 Inclusion Criteria, , , CONTINUOUS **AND** Protocol 1073 NOTIFY, , , Once **AND** Protocol 1073 Initiate protocol immediately if FSBG is less than or equal to 70 mg/dL, , , CONTINUOUS **AND** glucose 4 g chewable tablet 16 g, 16 g, Oral, Q15 MIN PRN **AND** dextrose 50% (D50W) injection 25 mL, 25 mL, Intravenous, Q15 MIN PRN, Angelo Sandy M.D., Stopped at 05/23/17 2128  •  dextrose 5 % and 0.45 % NaCl with KCl 20 mEq, , Intravenous, Continuous, Kash Prakash  ANNABELLA DICKENS, Last Rate: 125 mL/hr at 05/26/17 0556    Labs:  Recent Results (from the past 24 hour(s))   ACCU-CHEK GLUCOSE    Collection Time: 05/25/17  3:37 PM   Result Value Ref Range    Glucose - Accu-Ck 82 65 - 99 mg/dL   ACCU-CHEK GLUCOSE    Collection Time: 05/25/17  5:59 PM   Result Value Ref Range    Glucose - Accu-Ck 90 65 - 99 mg/dL   ACCU-CHEK GLUCOSE    Collection Time: 05/26/17 12:16 AM   Result Value Ref Range    Glucose - Accu-Ck 101 (H) 65 - 99 mg/dL   ACCU-CHEK GLUCOSE    Collection Time: 05/26/17  6:04 AM   Result Value Ref Range    Glucose - Accu-Ck 93 65 - 99 mg/dL   CBC with Differential    Collection Time: 05/26/17  6:05 AM   Result Value Ref Range    WBC 5.6 4.8 - 10.8 K/uL    RBC 3.70 (L) 4.20 - 5.40 M/uL    Hemoglobin 11.1 (L) 12.0 - 16.0 g/dL    Hematocrit 34.2 (L) 37.0 - 47.0 %    MCV 92.4 81.4 - 97.8 fL    MCH 30.0 27.0 - 33.0 pg    MCHC 32.5 (L) 33.6 - 35.0 g/dL    RDW 50.4 (H) 35.9 - 50.0 fL    Platelet Count 218 164 - 446 K/uL    MPV 9.2 9.0 - 12.9 fL    Neutrophils-Polys 54.60 44.00 - 72.00 %    Lymphocytes 22.70 22.00 - 41.00 %    Monocytes 15.70 (H) 0.00 - 13.40 %    Eosinophils 6.10 0.00 - 6.90 %    Basophils 0.70 0.00 - 1.80 %    Immature Granulocytes 0.20 0.00 - 0.90 %    Nucleated RBC 0.00 /100 WBC    Neutrophils (Absolute) 3.05 2.00 - 7.15 K/uL    Lymphs (Absolute) 1.27 1.00 - 4.80 K/uL    Monos (Absolute) 0.88 (H) 0.00 - 0.85 K/uL    Eos (Absolute) 0.34 0.00 - 0.51 K/uL    Baso (Absolute) 0.04 0.00 - 0.12 K/uL    Immature Granulocytes (abs) 0.01 0.00 - 0.11 K/uL    NRBC (Absolute) 0.00 K/uL   Basic Metabolic Panel (BMP)    Collection Time: 05/26/17  6:05 AM   Result Value Ref Range    Sodium 141 135 - 145 mmol/L    Potassium 3.8 3.6 - 5.5 mmol/L    Chloride 111 96 - 112 mmol/L    Co2 25 20 - 33 mmol/L    Glucose 102 (H) 65 - 99 mg/dL    Bun 4 (L) 8 - 22 mg/dL    Creatinine 0.59 0.50 - 1.40 mg/dL    Calcium 8.8 8.5 - 10.5 mg/dL    Anion Gap 5.0 0.0 - 11.9   Magnesium    Collection  Time: 05/26/17  6:05 AM   Result Value Ref Range    Magnesium 2.0 1.5 - 2.5 mg/dL   Phosphorus    Collection Time: 05/26/17  6:05 AM   Result Value Ref Range    Phosphorus 3.2 2.5 - 4.5 mg/dL   ESTIMATED GFR    Collection Time: 05/26/17  6:05 AM   Result Value Ref Range    GFR If African American >60 >60 mL/min/1.73 m 2    GFR If Non African American >60 >60 mL/min/1.73 m 2     ECG: QTc 468    Cranial Imaging: reviewed    ASSESSMENT: (new dx, acuity level)    26 y.o. female with PMH of amphetamine abuse /IVDA, history of suicidal attempt, PTSD, and bipolar disorder brought in by care flight from [facility] for altered level of consciousness secondary to medication/polysubstance overdose.    #Bipolar Disorder   - Patient with past diagnosis of bipolar disorder treated with Seroquel, Valium, and lithium   - Previous suicide attempt/overdose in April 2017   - Showing labile mood, lowered boundaries, hypersexual behavior, changing stories    - Consult SW to arrange change in SSI payee   - Start legal hold, keep at least over weekend for observation for development of kenneth   - Change Seroquel to 200mg BID   - Repeat EKG before discharge to check QTc     #R/o Borderline personality disorder   - Displays possible overattachment to boyfriend, Kaleb   - Possible suicide attempts/overdose   - Impulsive overdose behavior, substance use, labile mood   - Need more history and observation to r/o    #Amphetamine Use Disorder   - Patient has been using for 6mos, routes include smoking, snorting, and IV   - UDS positive for amphetamines    - Patient wishes to go to [facility] to get clean   - Consult SW to set up transfer    #PTSD   - Prior diagnosis, currently denying symptoms, not willing to discuss    PLAN:  Legal status: Extend hold  Labs/tests ordered: None  Medications: Seroquel 200mg BID  Records reviewed: Yes  Discussed patient with other provider: Yes  Will follow  Thank you for the consult.

## 2017-05-26 NOTE — DISCHARGE PLANNING
Called [facility] and they have open beds and will be seeing patient today to assess for program eligibility.   Discussed with [facility].      Anticipate DC tomorrow.   [facility].

## 2017-05-26 NOTE — DISCHARGE PLANNING
Order received from Dr Arroyo to petition for civil commitment and extend hold.      Legal hold       Legal Hold  Where was patient when legal hold initiated: [facility]  Legal Hold will : [phone]    Medical Clearance Complete: {No    Psychiatric Certification Complete: {YES    Paperwork sent to  for extension: {YES    What doctor will be signing the extension: Cruz    Extension paperwork attained: {No    Will patient present to [facility] mental health on Wednesday morning?: {No    Referral placed to Psychiatric Facility: {No    Ongoing Plan: Wait for medical clearance.

## 2017-05-26 NOTE — DISCHARGE PLANNING
Waiting for [facility] to arrive to assess patient for IP Detox program.  Call received from Mother Gissel and I provided update.      Please call   Bria [phone]   Saturday    Barbara [phone]   Sunday

## 2017-05-26 NOTE — CARE PLAN
Problem: Safety  Goal: Will remain free from falls  Pt lethargic with intermittent confusion. Bed alarm on, bed in lowest position, call light and personal belongings in place. Safety precautions and hourly rounding in place.     Problem: Respiratory:  Goal: Respiratory status will improve  Outcome: PROGRESSING AS EXPECTED  Pt tolerated extubation well. Continuous pulse ox in use, 2 L 02 via NC in place with saturations %. Safety precautions and continuous monitoring in place.

## 2017-05-26 NOTE — DISCHARGE PLANNING
Patient requesting information on IP SA rehab.  Contacted Insurance [phone] #[id number].  [facility] has Detox ER coverage only.  No IP SA Rehab benefits through Current plan.

## 2017-05-26 NOTE — PROGRESS NOTES
Pulmonary Critical Care Progress Note        DOS:  5/26/2017    Chief Complaint: drug overdose     History of Present Illness:  26 y.o. female with PMH of amphetamine abuse /IVDA,  History of suicidal attempts. PTSD, schizoaffective disorder, was brought in by care flight from [facility] for altered level of consciousness secondary to likely medication/polysubstance overdose. History obtained from MR, as pt is unresponsive. Per EMS, the patient was found down outside of medical clinic where she was noted to be somnolent and intermittently unresponsive, with intermittent lateral gaze palsy. She  was given 1 mg of Narcan probably with no good response,  And patient ended up being intubated to protect airways, using etomidate, succinylcholine, rocuronium, and Versed. She got 1 L NS outside and given 1 more L of NS at [facility] ED.      ROS:  Respiratory: unable to perform due to the patient's inability to effectively communicate, Cardiac: unable to perform due to the patient's inability to effectively communicate,   GI: unable to perform due to the patient's inability to effectively communicate.      Interval Events:  24 hour interval history reviewed   Extubated yesterday, tolerating well   Alert and oriented  Ambulating well  SR to ST, BP okay  I/O's 1,042cc/24hrs  Tmax 99.9  CXR shows: Clear      Yesterday:  Prop off this morning ---->15  Follows, agitated  Lots of secretions  SR, BP noted  BM PTA  I/O's +264cc/24hrs  Tmax 98.8  SBT this morning two hours on 5/8; RSBI 16, CV 3L, NIF -27  CXR shows: Clear    PFSH:  No change.    Respiratory:     Pulse Oximetry: 96 %  On room air  Exam: NAD on room air. Lungs clear with few coarse rhonchi.   ImagingAvailable data reviewed   CXR reviewed with team.       Recent Labs      05/23/17   1424  05/24/17   0505  05/25/17   0446   ISTATAPH  7.321*  7.336*  7.391*   ISTATAPCO2  41.1*  41.2*  39.3*   ISTATAPO2  138*  122*  136*   ISTATATCO2  22  23  25   RUGMDGC2KAO  99  98  99    ISTATARTHCO3  21.2  22.0  23.8   ISTATARTBE  -5*  -4  -1   ISTATTEMP  98.0 F  36.9 C  38.1 C   ISTATFIO2  40  35  35   ISTATSPEC  Arterial  Arterial  Arterial   ISTATAPHTC  7.326*  7.338*  7.375*   XLQIOHVT3TN  136*  121*  143*       HemoDynamics:  Pulse: 94, Heart Rate (Monitored): 94  NIBP: (!) 97/68 mmHg     Exam:  ST, RR, no murmur, no edema, good perfusion. No delta  Imaging: Available data reviewed     Recent Labs      05/23/17   1410  05/23/17   1855  05/23/17   2130   CPKTOTAL   --   53  47   TROPONINI  <0.01   --    --    BNPBTYPENAT  7   --    --        Neuro:  GCS Total Red Coma Score: 14  Exam: Awake and alert, PERRL, restrained, NFE, no meningeal signs. No change  Imaging: Available data reviewed    Fluids:  Intake/Output       05/24/17 0700 - 05/25/17 0659 05/25/17 0700 - 05/26/17 0659 05/26/17 0700 - 05/27/17 0659      1073-8442 7880-1104 Total 1385-2545 5818-9258 Total 3732-4438 8201-5870 Total       Intake    P.O.  --  -- --  1440  -- 1440  --  -- --    P.O. -- -- -- 1440 -- 1440 -- -- --    I.V.  1675.3  1288.3 2963.6  1631.5  500 2131.5  --  -- --    Propofol Volume 115.3 38.3 153.6 31.5 -- 31.5 -- -- --    IV Volume (NS) 60 -- 60 -- -- -- -- -- --    IV Volume (D5 1/2NS 20KCL) 1500 1250 2750 1145 985 7792 -- -- --    IV Piggyback Volume (IV Piggyback) -- -- -- 100 -- 100 -- -- --    Other  --  -- --  60  -- 60  --  -- --    Medications (P.O./ Enteral Liquids) -- -- -- 60 -- 60 -- -- --    Enteral  --  -- --  60  -- 60  --  -- --    Free Water / Tube Flush -- -- -- 60 -- 60 -- -- --    Total Intake 1675.3 1288.3 2963.6 3191.5 500 3691.5 -- -- --       Output    Urine  1650  1050 2700  2600  130 2730  --  -- --    Indwelling Cathether 1650 1050 2700 2600 130 2730 -- -- --    Total Output 1650 1050 2700 2600 130 2730 -- -- --       Net I/O     25.3 238.3 263.6 591.5 370 961.5 -- -- --        Weight: 63 kg (138 lb 14.2 oz)  Recent Labs      05/23/17   1410  05/23/17   1855  05/24/17   0510   17   SODIUM  143  142  140  139   POTASSIUM  3.6  3.5*  3.7  3.5*   CHLORIDE  116*  118*  114*  110   CO2    24   BUN  7*  5*  4*  2*   CREATININE  0.59  0.56  0.59  0.56   MAGNESIUM  1.9   --   1.8  1.9   PHOSPHORUS  3.4   --   3.4  3.8   CALCIUM  9.0  8.5  8.8  8.6       GI/Nutrition:  Exam: Flat, soft, NT/ND, + Bowel sounds  Imaging: Available data reviewed  taking PO     Liver Function  Recent Labs      17   1410  17   1855  17   ALTSGPT  7   --    --    --    ASTSGOT  12   --    --    --    ALKPHOSPHAT  62   --    --    --    TBILIRUBIN  1.0   --    --    --    GLUCOSE  99  74  93  153*       Heme:  Recent Labs      17   RBC  3.49*  3.34*  3.51*   HEMOGLOBIN  10.4*  9.9*  10.5*   HEMATOCRIT  32.1*  31.7*  32.3*   PLATELETCT  223  195  213   PROTHROMBTM  15.4*   --    --    APTT  20.2*   --    --    INR  1.18*   --    --        Infectious Disease:  Temp  Av.2 °C (99 °F)  Min: 36.7 °C (98.1 °F)  Max: 37.7 °C (99.9 °F)  Micro: reviewed     Recent Labs      17   14117   WBC  5.4  5.2  6.9   NEUTSPOLYS  55.40  52.50  68.30   LYMPHOCYTES  31.30  24.60  14.70*   MONOCYTES  10.10  16.00*  14.40*   EOSINOPHILS  2.40  5.90  1.60   BASOPHILS  0.60  0.60  0.70   ASTSGOT  12   --    --    ALTSGPT  7   --    --    ALKPHOSPHAT  62   --    --    TBILIRUBIN  1.0   --    --      Current Facility-Administered Medications   Medication Dose Frequency Provider Last Rate Last Dose   • acetaminophen (TYLENOL) tablet 650 mg  650 mg Q4HRS PRN Michelle Sun M.D.   650 mg at 17 1135   • quetiapine (SEROQUEL) tablet 100 mg  100 mg TID Pierce Siegel M.D.   100 mg at 17 2041   • enoxaparin (LOVENOX) inj 40 mg  40 mg DAILY Michelle Sun M.D.   40 mg at 17 0821   • haloperidol lactate (HALDOL) injection 1-5 mg  1-5 mg Q4HRS PRN Pierce Siegel M.D.       •  Respiratory Care per Protocol   Continuous RT Angelo Sandy M.D.       • senna-docusate (PERICOLACE or SENOKOT S) 8.6-50 MG per tablet 2 Tab  2 Tab BID Angelo Sandy M.D.   2 Tab at 05/25/17 0813    And   • polyethylene glycol/lytes (MIRALAX) PACKET 1 Packet  1 Packet QDAY PRN Angelo Sandy M.D.        And   • magnesium hydroxide (MILK OF MAGNESIA) suspension 30 mL  30 mL QDAY PRN Angelo Sandy M.D.        And   • bisacodyl (DULCOLAX) suppository 10 mg  10 mg QDAY PRN Angelo Sandy M.D.       • lidocaine (XYLOCAINE) 1%  injection  1-2 mL Q30 MIN PRN Angelo Sandy M.D.   Stopped at 05/24/17 1838   • MD ALERT...Adult ICU Electrolyte Replacement per Pharmacy Protocol   pharmacy to dose Angelo Sandy M.D.       • fentaNYL (SUBLIMAZE) injection 25 mcg  25 mcg Q HOUR PRN Agnelo Sandy M.D.        Or   • fentaNYL (SUBLIMAZE) injection 50 mcg  50 mcg Q HOUR PRN Angelo Sandy M.D.   50 mcg at 05/25/17 1047    Or   • fentaNYL (SUBLIMAZE) injection 100 mcg  100 mcg Q HOUR PRN Angelo Sandy M.D.       • ipratropium-albuterol (DUONEB) nebulizer solution 3 mL  3 mL Q2HRS PRN (RT) Angelo Sandy M.D.       • famotidine (PEPCID) tablet 20 mg  20 mg Q12HRS Angelo Sandy M.D.   20 mg at 05/25/17 0900   • insulin lispro (HUMALOG) injection 1-6 Units  1-6 Units Q6HRS Angelo Sandy M.D.   Stopped at 05/23/17 1840   • glucose 4 g chewable tablet 16 g  16 g Q15 MIN PRN Angelo Sandy M.D.        And   • dextrose 50% (D50W) injection 25 mL  25 mL Q15 MIN PRN Angelo Sandy M.D.   Stopped at 05/23/17 2128   • propofol (DIPRIVAN) injection  5-80 mcg/kg/min Continuous Angelo Sandy M.D.   Stopped at 05/25/17 1055   • thiamine (B-1) 100 mg in D5W 100 mL IVPB  100 mg DAILY Kash DICKENS M.D. 200 mL/hr at 05/23/17 2141 100 mg at 05/23/17 2141   • dextrose 5 % and 0.45 % NaCl with KCl 20 mEq   Continuous Kash DICKENS M.D. 125 mL/hr at 05/25/17 2030       Last reviewed on 5/23/2017  3:56 PM by Delia  DILMA Vo, T    Quality  Measures:  Labs reviewed, Radiology images reviewed, Medications reviewed and EKG reviewed  Mckenzie catheter: Critically Ill - Requiring Accurate Measurement of Urinary Output and Unconscious / Sedated Patient on a Ventilator      DVT Prophylaxis: Heparin  DVT prophylaxis - mechanical: SCDs  Ulcer prophylaxis: Yes    Assessed for rehab: Patient unable to tolerate rehabilitation therapeutic regimen      Problems/plan:  Acute hypoxemic respiratory failure - not protecting airway   Intubated 5/23 in ER   SBTs failed last night - apnea - trying again this AM - ready for extubation trial?   RT protocols   Sedation vacations/Mobilize  Altered LOC - CT head neg,    Utox + meth/benzo, marginally elevated Li,    aceta/salycilate levels wnl, etoh wnl, afebrile,    current neuro exam remains non focal, wbc and lactate wnl - neck supple  H/o Chronic Meth Use - + on u tox today  Medical Noncompliance with Psych meds    - mother states quit taking few days ago and she kicked her out 2 days ago  Schizoaffective Disorder - Seroquel/Li+  PTSD  Hx of SI/SA  Anemai - no clinical bleeding   Follow - transfuse PRN  ERP  Recent Admit 4/15-21 with similar ALOC and polysubstance abuse  Enteral nutrition via Cortrak if not extubated today  Prophylaxis    Plan:  Home dose Seroquel  Legal hold if needed/defer to psych?  Psych eval this AM  Patient requesting inpatient drug rehab program for methamphetamine abuse  D/C Pepcid  Medically clear  RCC will S/o to [facility] upon transfer out of [facility]    Discussed patient condition and risk of morbidity and/or mortality with RN, RT, Pharmacy, [facility] resident and Charge nurse / hot rounds.        Krista MONTEMAYOR (Je), am scribing for, and in the presence of, Pierce Siegel M.D.  Electronically signed by: Krista Fagan (Je), 5/26/2017  Pierce MONTEMAYOR M.D. personally performed the services described in this documentation, as scribed by Krista Fagan in  my presence, and it is both accurate and complete.

## 2017-05-27 PROBLEM — T50.901A: Status: RESOLVED | Noted: 2017-05-26 | Resolved: 2017-05-27

## 2017-05-27 PROBLEM — T50.901A DRUG OVERDOSE: Status: ACTIVE | Noted: 2017-05-27

## 2017-05-27 PROBLEM — D50.9 IRON DEFICIENCY ANEMIA: Status: ACTIVE | Noted: 2017-05-27

## 2017-05-27 LAB
ANION GAP SERPL CALC-SCNC: 6 MMOL/L (ref 0–11.9)
ANISOCYTOSIS BLD QL SMEAR: ABNORMAL
BASOPHILS # BLD AUTO: 1.8 % (ref 0–1.8)
BASOPHILS # BLD: 0.09 K/UL (ref 0–0.12)
BUN SERPL-MCNC: 6 MG/DL (ref 8–22)
CALCIUM SERPL-MCNC: 8.9 MG/DL (ref 8.5–10.5)
CHLORIDE SERPL-SCNC: 107 MMOL/L (ref 96–112)
CO2 SERPL-SCNC: 23 MMOL/L (ref 20–33)
CREAT SERPL-MCNC: 0.58 MG/DL (ref 0.5–1.4)
EKG IMPRESSION: NORMAL
EOSINOPHIL # BLD AUTO: 0.41 K/UL (ref 0–0.51)
EOSINOPHIL NFR BLD: 7.9 % (ref 0–6.9)
ERYTHROCYTE [DISTWIDTH] IN BLOOD BY AUTOMATED COUNT: 48.5 FL (ref 35.9–50)
FERRITIN SERPL-MCNC: 29.2 NG/ML (ref 10–291)
GFR SERPL CREATININE-BSD FRML MDRD: >60 ML/MIN/1.73 M 2
GLUCOSE BLD-MCNC: 112 MG/DL (ref 65–99)
GLUCOSE BLD-MCNC: 96 MG/DL (ref 65–99)
GLUCOSE SERPL-MCNC: 108 MG/DL (ref 65–99)
HCT VFR BLD AUTO: 34.2 % (ref 37–47)
HGB BLD-MCNC: 11.2 G/DL (ref 12–16)
HGB RETIC QN AUTO: 28.8 PG/CELL (ref 29–35)
IMM RETICS NFR: 13.9 % (ref 9.3–17.4)
IRON SATN MFR SERPL: 11 % (ref 15–55)
IRON SERPL-MCNC: 33 UG/DL (ref 40–170)
LG PLATELETS BLD QL SMEAR: NORMAL
LYMPHOCYTES # BLD AUTO: 1.57 K/UL (ref 1–4.8)
LYMPHOCYTES NFR BLD: 30.1 % (ref 22–41)
MANUAL DIFF BLD: NORMAL
MCH RBC QN AUTO: 29.6 PG (ref 27–33)
MCHC RBC AUTO-ENTMCNC: 32.7 G/DL (ref 33.6–35)
MCV RBC AUTO: 90.5 FL (ref 81.4–97.8)
MONOCYTES # BLD AUTO: 0.6 K/UL (ref 0–0.85)
MONOCYTES NFR BLD AUTO: 11.5 % (ref 0–13.4)
MORPHOLOGY BLD-IMP: NORMAL
NEUTROPHILS # BLD AUTO: 2.53 K/UL (ref 2–7.15)
NEUTROPHILS NFR BLD: 46.9 % (ref 44–72)
NEUTS BAND NFR BLD MANUAL: 1.8 % (ref 0–10)
NRBC # BLD AUTO: 0 K/UL
NRBC BLD AUTO-RTO: 0 /100 WBC
PLATELET # BLD AUTO: 237 K/UL (ref 164–446)
PMV BLD AUTO: 9.3 FL (ref 9–12.9)
POTASSIUM SERPL-SCNC: 3.7 MMOL/L (ref 3.6–5.5)
RBC # BLD AUTO: 3.78 M/UL (ref 4.2–5.4)
RBC BLD AUTO: PRESENT
RETICS # AUTO: 0.06 M/UL (ref 0.04–0.06)
RETICS/RBC NFR: 1.6 % (ref 0.8–2.1)
SMUDGE CELLS BLD QL SMEAR: NORMAL
SODIUM SERPL-SCNC: 136 MMOL/L (ref 135–145)
TIBC SERPL-MCNC: 307 UG/DL (ref 250–450)
TSH SERPL DL<=0.005 MIU/L-ACNC: 1.24 UIU/ML (ref 0.3–3.7)
WBC # BLD AUTO: 5.2 K/UL (ref 4.8–10.8)

## 2017-05-27 PROCEDURE — 99232 SBSQ HOSP IP/OBS MODERATE 35: CPT | Mod: GC | Performed by: HOSPITALIST

## 2017-05-27 PROCEDURE — 700102 HCHG RX REV CODE 250 W/ 637 OVERRIDE(OP): Performed by: INTERNAL MEDICINE

## 2017-05-27 PROCEDURE — 700111 HCHG RX REV CODE 636 W/ 250 OVERRIDE (IP): Performed by: HOSPITALIST

## 2017-05-27 PROCEDURE — 85027 COMPLETE CBC AUTOMATED: CPT

## 2017-05-27 PROCEDURE — 85007 BL SMEAR W/DIFF WBC COUNT: CPT

## 2017-05-27 PROCEDURE — 83540 ASSAY OF IRON: CPT

## 2017-05-27 PROCEDURE — 700102 HCHG RX REV CODE 250 W/ 637 OVERRIDE(OP): Performed by: PSYCHIATRY & NEUROLOGY

## 2017-05-27 PROCEDURE — 83550 IRON BINDING TEST: CPT

## 2017-05-27 PROCEDURE — 85046 RETICYTE/HGB CONCENTRATE: CPT

## 2017-05-27 PROCEDURE — 93005 ELECTROCARDIOGRAM TRACING: CPT | Performed by: HOSPITALIST

## 2017-05-27 PROCEDURE — 700102 HCHG RX REV CODE 250 W/ 637 OVERRIDE(OP): Performed by: HOSPITALIST

## 2017-05-27 PROCEDURE — 82962 GLUCOSE BLOOD TEST: CPT

## 2017-05-27 PROCEDURE — A9270 NON-COVERED ITEM OR SERVICE: HCPCS | Performed by: INTERNAL MEDICINE

## 2017-05-27 PROCEDURE — 80048 BASIC METABOLIC PNL TOTAL CA: CPT

## 2017-05-27 PROCEDURE — A9270 NON-COVERED ITEM OR SERVICE: HCPCS | Performed by: HOSPITALIST

## 2017-05-27 PROCEDURE — A9270 NON-COVERED ITEM OR SERVICE: HCPCS | Performed by: PSYCHIATRY & NEUROLOGY

## 2017-05-27 PROCEDURE — 84443 ASSAY THYROID STIM HORMONE: CPT

## 2017-05-27 PROCEDURE — 770006 HCHG ROOM/CARE - MED/SURG/GYN SEMI*

## 2017-05-27 PROCEDURE — 82728 ASSAY OF FERRITIN: CPT

## 2017-05-27 PROCEDURE — 93010 ELECTROCARDIOGRAM REPORT: CPT | Performed by: INTERNAL MEDICINE

## 2017-05-27 PROCEDURE — 36415 COLL VENOUS BLD VENIPUNCTURE: CPT

## 2017-05-27 RX ORDER — POTASSIUM CHLORIDE 20 MEQ/1
40 TABLET, EXTENDED RELEASE ORAL ONCE
Status: COMPLETED | OUTPATIENT
Start: 2017-05-27 | End: 2017-05-27

## 2017-05-27 RX ORDER — FERROUS GLUCONATE 324(38)MG
324 TABLET ORAL
Status: DISCONTINUED | OUTPATIENT
Start: 2017-05-27 | End: 2017-05-30 | Stop reason: HOSPADM

## 2017-05-27 RX ADMIN — FERROUS GLUCONATE 324 MG: 324 TABLET ORAL at 09:25

## 2017-05-27 RX ADMIN — THERA TABS 1 TABLET: TAB at 09:24

## 2017-05-27 RX ADMIN — POTASSIUM CHLORIDE 40 MEQ: 1500 TABLET, EXTENDED RELEASE ORAL at 09:24

## 2017-05-27 RX ADMIN — STANDARDIZED SENNA CONCENTRATE AND DOCUSATE SODIUM 2 TABLET: 8.6; 5 TABLET, FILM COATED ORAL at 22:35

## 2017-05-27 RX ADMIN — ACETAMINOPHEN 650 MG: 325 TABLET, FILM COATED ORAL at 09:24

## 2017-05-27 RX ADMIN — ENOXAPARIN SODIUM 40 MG: 100 INJECTION SUBCUTANEOUS at 09:27

## 2017-05-27 RX ADMIN — Medication 100 MG: at 09:24

## 2017-05-27 RX ADMIN — QUETIAPINE FUMARATE 200 MG: 100 TABLET ORAL at 22:35

## 2017-05-27 ASSESSMENT — ENCOUNTER SYMPTOMS
FOCAL WEAKNESS: 0
HEARTBURN: 0
FEVER: 0
COUGH: 0
PALPITATIONS: 0
MYALGIAS: 0
WEAKNESS: 0
NECK PAIN: 0
NAUSEA: 0
HEMOPTYSIS: 0
BLURRED VISION: 0
DIZZINESS: 0
HEADACHES: 0
DOUBLE VISION: 0
CHILLS: 0
VOMITING: 0

## 2017-05-27 ASSESSMENT — PAIN SCALES - GENERAL
PAINLEVEL_OUTOF10: 0
PAINLEVEL_OUTOF10: 0

## 2017-05-27 NOTE — PROGRESS NOTES
Mago has been walking on her own and appears to be in a good mood. Took Mago for a walk without loosing balance along with a very good discussion.  Her awareness level has become better today.

## 2017-05-27 NOTE — PROGRESS NOTES
"Pt refusing to take seroquel this morning stating \"i never take that during the day, only at night\". Requesting to speak to psych today.   "

## 2017-05-27 NOTE — PROGRESS NOTES
[facility] Interval Note    Name Mago Bowman 1990   Age/Sex 26 y.o. female   MRN [id number]   Code Status  full      After 5PM or if no immediate response to page, please call for cross-coverage  Attending/Team: Marti Call [phone] to page   1st Call - Day Intern (R1):   Kareen 2nd Call - Day Sr. Resident (R2/R3):   Katalina         Chief complaint/ reason for interval visit (Primary Diagnosis)   Altered mental status    Interval Problem Daily Status Update    Active Problems:    Drug overdose POA: Unknown    Iron deficiency anemia POA: Yes      Overview: Stable. We'll start on iron replacement.    Schizoaffective disorder (CMS-HCC) POA: Yes    Bipolar 1 disorder (CMS-HCC) POA: Yes    PTSD (post-traumatic stress disorder) POA: Yes    Methamphetamine abuse POA: Yes  Resolved Problems:    Respiratory failure (CMS-HCC) POA: Yes    Drug poisoning POA: Yes    Acute encephalopathy POA: Yes    Fever POA: No    History of attempted suicide POA: No    Hypokalemia POA: Yes    Drug poisoning POA: Yes   resolved problems  Altered mental status    Review of Systems   Constitutional: Negative for fever, chills and malaise/fatigue.   HENT: Negative for congestion.    Eyes: Negative for blurred vision and double vision.   Respiratory: Negative for cough and hemoptysis.    Cardiovascular: Negative for chest pain and palpitations.   Gastrointestinal: Negative for heartburn, nausea and vomiting.   Genitourinary: Negative for dysuria and urgency.   Musculoskeletal: Negative for myalgias and neck pain.   Neurological: Negative for dizziness, focal weakness, weakness and headaches.       Consultants/Specialty  [facility]  Psychiatry    Disposition  Inpatient pending psychiatric clearance    Quality Measures  EKG reviewed, Labs reviewed, Medications reviewed and Radiology images reviewed  Mckenzie catheter: No Mckenzie      DVT Prophylaxis: Enoxaparin (Lovenox)    Ulcer  "prophylaxis: No and Not indicated    Assessed for rehab: Patient returned to prior level of function, rehabilitation not indicated at this time          Physical Exam       Filed Vitals:    05/26/17 1950 05/27/17 0455 05/27/17 0600 05/27/17 0929   BP: 110/74 115/63 103/62    Pulse: 96 80 85    Temp: 37.2 °C (98.9 °F) 36.1 °C (97 °F) 36.1 °C (97 °F)    Resp: 16 16 16    Height:    1.651 m (5' 5\")   Weight:    61.2 kg (134 lb 14.7 oz)   SpO2: 92% 95% 94%      Body mass index is 22.45 kg/(m^2). Weight: 61.2 kg (134 lb 14.7 oz)  Oxygen Therapy:  Pulse Oximetry: 94 %, O2 (LPM): 0, O2 Delivery: None (Room Air)    Physical Exam   Constitutional: She is oriented to person, place, and time and well-developed, well-nourished, and in no distress. No distress.   HENT:   Head: Normocephalic and atraumatic.   Eyes: Pupils are equal, round, and reactive to light.   Neck: Neck supple.   Cardiovascular: Normal rate and regular rhythm.  Exam reveals no gallop and no friction rub.    No murmur heard.  Pulmonary/Chest: Effort normal. No respiratory distress. She has no wheezes.   Abdominal: Bowel sounds are normal. She exhibits no distension. There is no tenderness.   Musculoskeletal: Normal range of motion. She exhibits no edema or tenderness.   Neurological: She is alert and oriented to person, place, and time.   Skin: She is not diaphoretic.         Lab Data Review:      5/27/2017  12:26 PM    Recent Labs      05/25/17   0512  05/26/17   0605  05/27/17   0536   SODIUM  139  141  136   POTASSIUM  3.5*  3.8  3.7   CHLORIDE  110  111  107   CO2  24  25  23   BUN  2*  4*  6*   CREATININE  0.56  0.59  0.58   MAGNESIUM  1.9  2.0   --    PHOSPHORUS  3.8  3.2   --    CALCIUM  8.6  8.8  8.9       Recent Labs      05/25/17   0512  05/26/17   0605  05/27/17   0536   GLUCOSE  153*  102*  108*       Recent Labs      05/25/17   0512  05/26/17   0605  05/27/17   0536   RBC  3.51*  3.70*  3.78*   HEMOGLOBIN  10.5*  11.1*  11.2*   HEMATOCRIT  32.3*  " 34.2*  34.2*   PLATELETCT  213  218  237   IRON   --    --   33*   FERRITIN   --    --   29.2   [facility]   --    --   307       Recent Labs      05/25/17   0512  05/26/17   0605  05/27/17   0536   WBC  6.9  5.6  5.2   NEUTSPOLYS  68.30  54.60  46.90   LYMPHOCYTES  14.70*  22.70  30.10   MONOCYTES  14.40*  15.70*  11.50   EOSINOPHILS  1.60  6.10  7.90*   BASOPHILS  0.70  0.70  1.80           Assessment/Plan     Drug overdose  Assessment & Plan  -S/P intubation and mechanical ventilation. Intubated on the 22nd and extubated on the 25th.  -Urine drug screen positive for methamphetamine.  -Possible overdose on home medications including Seroquel and haloperidol.  -Currently stable.  -Started on Seroquel per psychiatry.  -Legal hold    Iron deficiency anemia (present on admission)  Assessment & Plan  -Iron studies showed iron 33, TIBC 307, iron saturation 11. Reticulocyte count 1.6.  -Hemoglobin is 11.2.  -No active bleeding.  -We'll start patient on iron tablets.    Schizoaffective disorder  Bipolar one disorder  PTSD  Amphetamin abuse.  -Psych following  -Patient on legal hold.  -Seroquel 200 twice a day.

## 2017-05-27 NOTE — PROGRESS NOTES
"Received sleeping, drowsy but easily to arouse, oriented x 4, pleasant, cooperative, denied thought of hurting herself or others, denies any pain/discomfort, \" i'm tired I want to sleep \", offered snack, ivf continued as ordered, on Legal Hold, q 15 minutes observation per protocol, call light within reach, bed alarm and scd in placed, one person assist to the bathroom, seizure precaution noted, need attended, will continue to monitor.  "

## 2017-05-27 NOTE — PROGRESS NOTES
"[facility] ICU Progress Note      Admit Date: 5/23/2017  ICU Day: 4     Resident(s): Michelle Sun  Attending: SAMAN UMANZOR/ Dr. Siegel     Date & Time:   5/26/2017          Patient ID:    Name:             aMgo Bowman     YOB: 1990  Age:                 27 y/o  female   MRN:               [id number]    Diagnosis:  Acute hypoxic respiratory failure, unable to protect airways   Bipolar disorder   Borderline personality disorder   On legal hold per psychiatry   Methamphetamine abuse   H/o medication overdose   PTSD   Schizoaffective disorder   Normocytic anemia   Hypokalemia     HPI:  26 y.o. female with PMH of amphetamine abuse /IVDA,  History of suicidal attempts, hospitalizations in psychiatric facility for almost 9 yrs. PTSD, schizoaffective disorder, bipolar disorder was  brought in by care flight from [facility] for altered level of consciousness secondary to likely medication/polysubstance overdose.     Consultants:  PMA: Dr Siegel , Dr Sandy     Interval Events:  Met with mother and patient bedside, she states \"I did not take medications intentionally, I smoked meth, got high and swallowed bunch of medications\". Her mother states \"she needs help\". Pt agreed to go to [facility] for detox. Consulted psychiatry, Dr Arroyo evaluated patient and placed on legal hold. Per RN patient mentioned that she was waiting at [place] for her boyfriend to  however when he did not come she decided to take meth and drugs. Her story changes multiple times.   Appreciate psychiatry recommendations. Increased dose of seroquel to 200 mg BID, repeat EKG to monitor QTc.   Stable to be transferred to floor.      PHYSICAL EXAM  Gen: intubated, sedated, follows commands   HEENT: NC/AT, no scleral icterus, no conjunctival injection, mucous membranes pink and moist.  Neck: Supple, central line in place   Cardiac: S1S2, RRR, no m/r/g, no JVD.  Respiratory: Normal effort, symmetrical, CTA b/l.  Abdomen: BS+, " soft  Ext: No edema, 2+ DP pulses b/l. Multiple cut marks over left arm, forearm, lower extremity, tattoo over right arm and forearm   Skin: Warm, dry  Neuro: no focal deficits     Respiratory:     Respiration: 14, Pulse Oximetry: 100 %    Chest Tube Drains:    Recent Labs      17   0505  17   0446   ISTATAPH  7.336*  7.391*   ISTATAPCO2  41.2*  39.3*   ISTATAPO2  122*  136*   ISTATATCO2  23  25   [id number]  98  99   ISTATARTHCO3  22.0  23.8   ISTATARTBE  -4  -1   ISTATTEMP  36.9 C  38.1 C   ISTATFIO2  35  35   ISTATSPEC  Arterial  Arterial   ISTATAPHTC  7.338*  7.375*   [id number]  121*  143*       HemoDynamics:  Pulse: (!) 109, Heart Rate (Monitored): (!) 108 NIBP: 104/66 mmHg        Fluids:        Intake/Output Summary (Last 24 hours) at 17  Last data filed at 17 1800   Gross per 24 hour   Intake 3586.49 ml   Output   2815 ml   Net 771.49 ml          Body mass index is 23.11 kg/(m^2).    Recent Labs      17   0510  17   0517   0605   SODIUM  140  139  141   POTASSIUM  3.7  3.5*  3.8   CHLORIDE  114*  110  111   CO2  22  24  25   BUN  4*  2*  4*   CREATININE  0.59  0.56  0.59   MAGNESIUM  1.8  1.9  2.0   PHOSPHORUS  3.4  3.8  3.2   CALCIUM  8.8  8.6  8.8       GI/Nutrition:  Recent Labs      17   0510  17   0512  17   0605   GLUCOSE  93  153*  102*       Heme:  Recent Labs      17   0510  17   0512  17   0605   RBC  3.34*  3.51*  3.70*   HEMOGLOBIN  9.9*  10.5*  11.1*   HEMATOCRIT  31.7*  32.3*  34.2*   PLATELETCT  195  213  218       Infectious Disease:  Temp  Av.9 °C (98.5 °F)  Min: 36.9 °C (98.4 °F)  Max: 37 °C (98.6 °F)  Recent Labs      17   0510  17   0512  17   0605   WBC  5.2  6.9  5.6   NEUTSPOLYS  52.50  68.30  54.60   LYMPHOCYTES  24.60  14.70*  22.70   MONOCYTES  16.00*  14.40*  15.70*   EOSINOPHILS  5.90  1.60  6.10   BASOPHILS  0.60  0.70  0.70       Meds:  • thiamine  100 mg     • [START  ON 5/27/2017] quetiapine  200 mg     • acetaminophen  650 mg     • enoxaparin (LOVENOX) injection  40 mg     • haloperidol lactate  1-5 mg     • Respiratory Care per Protocol       • senna-docusate  2 Tab      And   • polyethylene glycol/lytes  1 Packet      And   • magnesium hydroxide  30 mL      And   • bisacodyl  10 mg     • lidocaine  1-2 mL     • MD ALERT...Adult ICU Electrolyte Replacement per Pharmacy Protocol       • ipratropium-albuterol  3 mL     • insulin lispro  1-6 Units     • glucose 4 g  16 g      And   • dextrose 50%  25 mL Stopped (05/23/17 2122)   • dextrose 5 % and 0.45 % NaCl with KCl 20 mEq   125 mL/hr at 05/26/17 4181        Problem and Plan:    Bipolar disorder  H/o suicidal ideation   Presented with substance/medication overdose   - patient was intubated for airway protection from medication overdose   - UDS outside facility positive for meth   - after extubation she states that she overdosed on medications such as haldol, valium, seroquel, lithium   - Qtc within normal limits, monitored with serial EKG's were within normal limits   PLAN  - psychiatry on board appreciate Dr Arroyo recommendations   - LEGAL HOLD   - dose of seroquel dose increased to 200 mg BID   - repeat EKG to monitor Qtc     Methamphetamine abuse   - cessation counseling   - no signs of withdrawal     Normocytic anemia   - ferritin and iron panel pending       Resolved medical conditions   Acute hypoxic respiratory failure type III - unable to protect airway due to substance abuse   Acute metabolic encephalopathy due to substance abuse and medication overdose    One episode of hypoglycemia: which was initially thought to be secondary to drug overdose however fasting insulin, C-peptide, cortisol were within normal limits, she did not have any more episodes of hypoglycemia.       DISPO: transferred to floor      CODE STATUS: full code     Quality Measures:  Mckenzie Catheter: no  DVT Prophylaxis: lovenox   Ulcer Prophylaxis:  no   Antibiotics:no   Lines:PIV    Procedures:  Central line 5/23/17   Extubation 5/25/17     Imaging:  DX-CHEST-PORTABLE (1 VIEW)   Final Result      No radiographic evidence of acute cardiopulmonary process.      Interval extubation      DX-CHEST-PORTABLE (1 VIEW)   Final Result      No radiographic evidence of acute cardiopulmonary process.      DX-ABDOMEN FOR TUBE PLACEMENT   Final Result      Enteric tube placement with the tip projecting over the stomach body.      DX-CHEST-PORTABLE (1 VIEW)   Final Result      No significant change from prior exam.      DX-CHEST-LIMITED (1 VIEW)   Final Result         Line and tube placements as described above. No pneumothorax is noted. No consolidations are identified.      DX-CHEST-PORTABLE (1 VIEW)   Final Result      Clear chest.      OUTSIDE IMAGES-DX CHEST   Preliminary Result      OUTSIDE IMAGES-CT HEAD   Preliminary Result      OUTSIDE IMAGES-DX CHEST   Preliminary Result

## 2017-05-27 NOTE — ASSESSMENT & PLAN NOTE
-S/P intubation and mechanical ventilation. Intubated on the 22nd and extubated on the 25th.  -Urine drug screen positive for methamphetamine.  -Possible overdose on home medications including Seroquel and haloperidol.  -Currently medically is stable.  -on Seroquel per psychiatry started on 5/25.  -Legal hold>>psych facility.

## 2017-05-27 NOTE — CARE PLAN
Problem: Safety  Goal: Will remain free from injury  Outcome: PROGRESSING AS EXPECTED  Bed alarm in use, frequent q 15 monitoring, bed in low position with call light in reach, treaded socks on     Problem: Bowel/Gastric:  Goal: Normal bowel function is maintained or improved  Outcome: PROGRESSING AS EXPECTED

## 2017-05-27 NOTE — CARE PLAN
Problem: Discharge Barriers/Planning  Goal: Patient’s continuum of care needs will be met  Intervention: Involve patient and significant other/support system in setting and prioritizing goals for hospital stay and discharge  Awaiting for placement to [facility] whenever possible.

## 2017-05-27 NOTE — CARE PLAN
Problem: Safety  Goal: Will remain free from falls  Intervention: Assess risk factors for falls    05/25/17 2000 05/26/17 0800   OTHER   Fall Risk --  Risk to Fall - 0 - 1 point   Risk for Injury-Any positive answers results in the pt being at high risk for fall related injury --  Not Applicable   Mobility Status Assessment --  1-1 Healthcare Provider Required for Assistance with Ambulation & Transfer   History of fall --  2   Date of Last Fall 05/23/17 --    Pt Calls for Assistance --  Yes           Problem: Knowledge Deficit  Goal: Knowledge of disease process/condition, treatment plan, diagnostic tests, and medications will improve  Outcome: PROGRESSING AS EXPECTED  Plan of care discussed and questions answered. Pt. Has been put on legal hold. Plan explained to pt.

## 2017-05-27 NOTE — PROGRESS NOTES
"TRANSFER SUMMARY     26 y.o. female with PMH of methamphetamine abuse /IVDA, history of suicidal attempts (4/15/17), PTSD, bipolar disorder was  transferred from [facility] for altered level of consciousness secondary to medication/polysubstance overdose. Patient was intubated for airway protection. Home medications include Seroquel, haloperidol, diazepam, lithium. Patient initially tachycardic which resolved with IVF. She remained hemodynamically stable. Qtc remained stable. She had one episode of hypoglycemia however work up was negative. She was extubated on 5/25. Psychiatry was consulted. Placed on legal hold for high risk (history of suicidal ideation), inconsistent history.     Patient was admitted for the same (drug overdose) on 4/15/17, Psychiatry was consulted and started on seroquel 200 mg. She was discharged, decided to go to [place] to live with her father. However she returned to [place], states \"things did not go well\". Mother insisted that she needs help with detox.     Things to follow:  - psychiatry recommendations   - legal hold   - seroquel dose increased to 200 mg BID, follow Qtc before discharge     "

## 2017-05-27 NOTE — PROGRESS NOTES
Assumed care of pt at shift change. Pt is sleeping in bed at this time. Legal hold with q 15 minute close observation monitoring in progress per protocol. Call light and belongings within reach. Bed in low position with upper rails up x 2.

## 2017-05-28 PROBLEM — F25.9 SCHIZOAFFECTIVE DISORDER (HCC): Chronic | Status: ACTIVE | Noted: 2017-05-25

## 2017-05-28 PROCEDURE — A9270 NON-COVERED ITEM OR SERVICE: HCPCS | Performed by: PSYCHIATRY & NEUROLOGY

## 2017-05-28 PROCEDURE — 700102 HCHG RX REV CODE 250 W/ 637 OVERRIDE(OP): Performed by: HOSPITALIST

## 2017-05-28 PROCEDURE — 700102 HCHG RX REV CODE 250 W/ 637 OVERRIDE(OP): Performed by: PSYCHIATRY & NEUROLOGY

## 2017-05-28 PROCEDURE — 700102 HCHG RX REV CODE 250 W/ 637 OVERRIDE(OP): Performed by: INTERNAL MEDICINE

## 2017-05-28 PROCEDURE — A9270 NON-COVERED ITEM OR SERVICE: HCPCS | Performed by: HOSPITALIST

## 2017-05-28 PROCEDURE — A9270 NON-COVERED ITEM OR SERVICE: HCPCS | Performed by: INTERNAL MEDICINE

## 2017-05-28 PROCEDURE — 770006 HCHG ROOM/CARE - MED/SURG/GYN SEMI*

## 2017-05-28 PROCEDURE — 99232 SBSQ HOSP IP/OBS MODERATE 35: CPT | Mod: GC | Performed by: HOSPITALIST

## 2017-05-28 RX ADMIN — Medication 100 MG: at 09:25

## 2017-05-28 RX ADMIN — FERROUS GLUCONATE 324 MG: 324 TABLET ORAL at 09:25

## 2017-05-28 RX ADMIN — STANDARDIZED SENNA CONCENTRATE AND DOCUSATE SODIUM 2 TABLET: 8.6; 5 TABLET, FILM COATED ORAL at 22:35

## 2017-05-28 RX ADMIN — THERA TABS 1 TABLET: TAB at 09:25

## 2017-05-28 RX ADMIN — QUETIAPINE FUMARATE 200 MG: 100 TABLET ORAL at 22:35

## 2017-05-28 ASSESSMENT — PATIENT HEALTH QUESTIONNAIRE - PHQ9
SUM OF ALL RESPONSES TO PHQ QUESTIONS 1-9: 0
SUM OF ALL RESPONSES TO PHQ9 QUESTIONS 1 AND 2: 0
2. FEELING DOWN, DEPRESSED, IRRITABLE, OR HOPELESS: NOT AT ALL
1. LITTLE INTEREST OR PLEASURE IN DOING THINGS: NOT AT ALL

## 2017-05-28 ASSESSMENT — ENCOUNTER SYMPTOMS
CHILLS: 0
COUGH: 0
BLURRED VISION: 0
NAUSEA: 0
WEAKNESS: 0
HEARTBURN: 0
DIZZINESS: 0
FOCAL WEAKNESS: 0
VOMITING: 0
FEVER: 0
NECK PAIN: 0
HEMOPTYSIS: 0
PALPITATIONS: 0
HEADACHES: 0
DOUBLE VISION: 0
MYALGIAS: 0

## 2017-05-28 ASSESSMENT — PAIN SCALES - GENERAL
PAINLEVEL_OUTOF10: 0
PAINLEVEL_OUTOF10: 0

## 2017-05-28 NOTE — CARE PLAN
Problem: Discharge Barriers/Planning  Goal: Patient’s continuum of care needs will be met  Awaiting visit from [facility] for approval for detox program

## 2017-05-28 NOTE — PROGRESS NOTES
"Pt very tearful this afternoon stating that she is frustrated that she has just been sitting in her room with nothing to keep her busy but think about her life. States she knows she is in an unhealthy relationship and knows she needs to leave her current boyfriend but she's \"the kind of girl that needs to be in a relationship\". This RN went for a walk out to the [employer] [place] with the pt for 20 minutes and had good conversation about her relationship and her desire to have a healthier life.   "

## 2017-05-28 NOTE — PROGRESS NOTES
Pt educated on fall risk, use of bed alarm for safety, non skid socks, bed in low position, CLIP, waiting for caregiver before getting OOB, pt refuses bed alarm despite education

## 2017-05-28 NOTE — PROGRESS NOTES
Assumed care of pt.     Skin integrity: WDL pt turns self    EENT: WDL   Respiratory: WDL     Cardiac: WDL     GI: WDL   : WDL     LBM: 5/27/17     NEURO: intact     A/O: x4     Plan of Care: Placement at [facility] pending, pt is up self, legal hold with supervised visits and cell phone ok per MD, pt is pleasant and cooperative but subdued, asking for panties and pads as she is menstruating, pt showered tonight, walking to nursing station occasionally to get needs met, reg diet, has been on phone cell phone off and on all night, wanted to wait to take scheduled medications until 2230 when she was ready for bed

## 2017-05-28 NOTE — PROGRESS NOTES
[facility] Interval Note    Name Mago Bowman 1990   Age/Sex 26 y.o. female   MRN [id number]   Code Status  full      After 5PM or if no immediate response to page, please call for cross-coverage  Attending/Team: Marti Call [phone] to page   1st Call - Day Intern (R1):   Kareen 2nd Call - Day Sr. Resident (R2/R3):   Katalina         Chief complaint/ reason for interval visit (Primary Diagnosis)   Altered mental status/drug over dose    Interval Problem Daily Status Update  :  Was admitted on  to ICU>>>transferred to floor .     Stable no complaints.  Psych facility placement.         Principal Problem:    Drug overdose POA: Yes  Active Problems:    Schizoaffective disorder (CMS-HCC) (Chronic) POA: Yes    Iron deficiency anemia POA: Yes      Overview: Stable. We'll start on iron replacement.    Bipolar 1 disorder (CMS-HCC) POA: Yes    PTSD (post-traumatic stress disorder) POA: Yes    Methamphetamine abuse POA: Yes  Resolved Problems:    Respiratory failure (CMS-[facility]) POA: Yes    Drug poisoning POA: Yes    Acute encephalopathy POA: Yes    Fever POA: No    History of attempted suicide POA: No    Hypokalemia POA: Yes    Drug poisoning POA: Yes   resolved problems  Altered mental status    Review of Systems   Constitutional: Negative for fever, chills and malaise/fatigue.   HENT: Negative for congestion.    Eyes: Negative for blurred vision and double vision.   Respiratory: Negative for cough and hemoptysis.    Cardiovascular: Negative for chest pain and palpitations.   Gastrointestinal: Negative for heartburn, nausea and vomiting.   Genitourinary: Negative for dysuria and urgency.   Musculoskeletal: Negative for myalgias and neck pain.   Neurological: Negative for dizziness, focal weakness, weakness and headaches.       Consultants/Specialty  PMA  Psychiatry    Disposition  Inpatient pending psychiatric clearance    Quality Measures  EKG  "reviewed, Labs reviewed, Medications reviewed and Radiology images reviewed  Mckenzie catheter: No Mckenzie      DVT Prophylaxis: Enoxaparin (Lovenox)    Ulcer prophylaxis: No and Not indicated    Assessed for rehab: Patient returned to prior level of function, rehabilitation not indicated at this time          Physical Exam       Filed Vitals:    05/27/17 0929 05/27/17 1400 05/27/17 1944 05/28/17 0318   BP:  105/72 105/64 99/59   Pulse:  97 102 100   Temp:  36.6 °C (97.8 °F) 36.8 °C (98.3 °F) 36.5 °C (97.7 °F)   Resp:  16 18 18   Height: 1.651 m (5' 5\")      Weight: 61.2 kg (134 lb 14.7 oz)      SpO2:  96% 96% 97%     Body mass index is 22.45 kg/(m^2). Weight: 61.2 kg (134 lb 14.7 oz)  Oxygen Therapy:  Pulse Oximetry: 97 %, O2 (LPM): 0, O2 Delivery: None (Room Air)    Physical Exam   Constitutional: She is oriented to person, place, and time and well-developed, well-nourished, and in no distress. No distress.   HENT:   Head: Normocephalic and atraumatic.   Eyes: Pupils are equal, round, and reactive to light.   Neck: Neck supple.   Cardiovascular: Normal rate and regular rhythm.  Exam reveals no gallop and no friction rub.    No murmur heard.  Pulmonary/Chest: Effort normal. No respiratory distress. She has no wheezes.   Abdominal: Bowel sounds are normal. She exhibits no distension. There is no tenderness.   Musculoskeletal: Normal range of motion. She exhibits no edema or tenderness.   Neurological: She is alert and oriented to person, place, and time.   Skin: She is not diaphoretic.         Lab Data Review:      5/27/2017  12:26 PM    Recent Labs      05/26/17   0605  05/27/17 0536   SODIUM  141  136   POTASSIUM  3.8  3.7   CHLORIDE  111  107   CO2  25  23   BUN  4*  6*   CREATININE  0.59  0.58   MAGNESIUM  2.0   --    PHOSPHORUS  3.2   --    CALCIUM  8.8  8.9       Recent Labs      05/26/17   0605  05/27/17   0536   GLUCOSE  102*  108*       Recent Labs      05/26/17   0605  05/27/17   0536   RBC  3.70*  3.78* "   HEMOGLOBIN  11.1*  11.2*   HEMATOCRIT  34.2*  34.2*   PLATELETCT  218  237   IRON   --   33*   FERRITIN   --   29.2   [facility]   --   307       Recent Labs      05/26/17   0605  05/27/17   0536   WBC  5.6  5.2   NEUTSPOLYS  54.60  46.90   LYMPHOCYTES  22.70  30.10   MONOCYTES  15.70*  11.50   EOSINOPHILS  6.10  7.90*   BASOPHILS  0.70  1.80           Assessment/Plan     * Drug overdose (present on admission)  Assessment & Plan  -S/P intubation and mechanical ventilation. Intubated on the 22nd and extubated on the 25th.  -Urine drug screen positive for methamphetamine.  -Possible overdose on home medications including Seroquel and haloperidol.  -Currently stable.  -on Seroquel per psychiatry started on 5/25.  -Legal hold>>psych facility.     Schizoaffective disorder (CMS-[facility]) (present on admission)  Assessment & Plan  -Psych following  -Patient on legal hold.  -Seroquel 200 twice a day.  - psych facility placement.     Iron deficiency anemia (present on admission)  Assessment & Plan  -Iron studies showed iron 33, TIBC 307, iron saturation 11. Reticulocyte count 1.6.  -Hemoglobin is 11.2 (stable).  -No active bleeding.  -start on iron tablets 5/27.  - Follow up

## 2017-05-29 PROCEDURE — 700102 HCHG RX REV CODE 250 W/ 637 OVERRIDE(OP): Performed by: PSYCHIATRY & NEUROLOGY

## 2017-05-29 PROCEDURE — A9270 NON-COVERED ITEM OR SERVICE: HCPCS | Performed by: PSYCHIATRY & NEUROLOGY

## 2017-05-29 PROCEDURE — A9270 NON-COVERED ITEM OR SERVICE: HCPCS | Performed by: HOSPITALIST

## 2017-05-29 PROCEDURE — 700102 HCHG RX REV CODE 250 W/ 637 OVERRIDE(OP): Performed by: INTERNAL MEDICINE

## 2017-05-29 PROCEDURE — A9270 NON-COVERED ITEM OR SERVICE: HCPCS | Performed by: INTERNAL MEDICINE

## 2017-05-29 PROCEDURE — 770006 HCHG ROOM/CARE - MED/SURG/GYN SEMI*

## 2017-05-29 PROCEDURE — 700102 HCHG RX REV CODE 250 W/ 637 OVERRIDE(OP): Performed by: HOSPITALIST

## 2017-05-29 PROCEDURE — 99231 SBSQ HOSP IP/OBS SF/LOW 25: CPT | Mod: GC | Performed by: HOSPITALIST

## 2017-05-29 RX ADMIN — Medication 100 MG: at 09:27

## 2017-05-29 RX ADMIN — QUETIAPINE FUMARATE 200 MG: 100 TABLET ORAL at 22:08

## 2017-05-29 RX ADMIN — STANDARDIZED SENNA CONCENTRATE AND DOCUSATE SODIUM 2 TABLET: 8.6; 5 TABLET, FILM COATED ORAL at 22:08

## 2017-05-29 RX ADMIN — THERA TABS 1 TABLET: TAB at 09:27

## 2017-05-29 RX ADMIN — FERROUS GLUCONATE 324 MG: 324 TABLET ORAL at 09:27

## 2017-05-29 ASSESSMENT — LIFESTYLE VARIABLES
EVER_SMOKED: YES
ALCOHOL_USE: NO
PACK_YEARS: 3

## 2017-05-29 ASSESSMENT — ENCOUNTER SYMPTOMS
NECK PAIN: 0
CHILLS: 0
DIZZINESS: 0
NAUSEA: 0
WEAKNESS: 0
HEADACHES: 0
BLURRED VISION: 0
COUGH: 0
FEVER: 0
FOCAL WEAKNESS: 0
DOUBLE VISION: 0
PALPITATIONS: 0
HEMOPTYSIS: 0
HEARTBURN: 0
VOMITING: 0
MYALGIAS: 0

## 2017-05-29 ASSESSMENT — PAIN SCALES - GENERAL
PAINLEVEL_OUTOF10: 0

## 2017-05-29 NOTE — CARE PLAN
Problem: Discharge Barriers/Planning  Goal: Patient’s continuum of care needs will be met  Intervention: Collaborate with Transitional Care Team and Interdisciplinary Team to meet discharge needs  Possible discharge to [place] when medically clear, still on legal hold,

## 2017-05-29 NOTE — PROGRESS NOTES
On Legal Hold till 5/29/2017at 1624, received awake and oriented x 4, walking in the hallway with unit staff, denies pain/discomfort, v/s stable, call light within reach, needs attended, will continue to monitor.

## 2017-05-29 NOTE — DISCHARGE PLANNING
TC with the pt's mother, Gissel. Gissel states she was concerned for the the pt's safety. Gissel informed this SW that the pt has a significant hx of MH and SIB. Gissel stated that the pt has had 2 previous overdoses this year. This SW informed Gissel that he was no able to discuss any specifics regarding the pt's case. Gissel stated she was going to contact the [facility] Public Guardian's office tomorrow and discuss filing for guardianship.

## 2017-05-29 NOTE — PROGRESS NOTES
[facility] [place] Internal Medicine Interval Note    Name Mago Bowman 1990   Age/Sex 26 y.o. female   MRN [id number]   Code Status  full      After 5PM or if no immediate response to page, please call for cross-coverage  Attending/Team: Marti Call [phone] to page   1st Call - Day Intern (R1):   Dr. Crawford 2nd Call - Day Sr. Resident (R2/R3):   Dr. Graves         Chief complaint/ reason for interval visit (Primary Diagnosis)   Altered mental status/drug over dose.    Interval Problem Daily Status Update  :  Was admitted on  to ICU>>>transferred to floor .     Stable no complaints.  Extend legal hold today.   Waiting for Psych facility placement.         Principal Problem:    Drug overdose POA: Yes  Active Problems:    Schizoaffective disorder (CMS-HCC) (Chronic) POA: Yes    Iron deficiency anemia POA: Yes      Overview: Stable. We'll start on iron replacement.    Bipolar 1 disorder (CMS-HCC) POA: Yes    PTSD (post-traumatic stress disorder) POA: Yes    Methamphetamine abuse POA: Yes  Resolved Problems:    Respiratory failure (CMS-[facility]) POA: Yes    Drug poisoning POA: Yes    Acute encephalopathy POA: Yes    Fever POA: No    History of attempted suicide POA: No    Hypokalemia POA: Yes    Drug poisoning POA: Yes   resolved problems  Altered mental status    Review of Systems   Constitutional: Negative for fever, chills and malaise/fatigue.   HENT: Negative for congestion.    Eyes: Negative for blurred vision and double vision.   Respiratory: Negative for cough and hemoptysis.    Cardiovascular: Negative for chest pain and palpitations.   Gastrointestinal: Negative for heartburn, nausea and vomiting.   Genitourinary: Negative for dysuria and urgency.   Musculoskeletal: Negative for myalgias and neck pain.   Neurological: Negative for dizziness, focal weakness, weakness and headaches.       Consultants/Specialty  [facility]  Psychiatry    Disposition  Inpatient pending  psychiatric clearance    Quality Measures  EKG reviewed, Labs reviewed, Medications reviewed and Radiology images reviewed  Mckenzie catheter: No Mckenzie      DVT Prophylaxis: Enoxaparin (Lovenox)    Ulcer prophylaxis: No and Not indicated    Assessed for rehab: Patient returned to prior level of function, rehabilitation not indicated at this time          Physical Exam       Filed Vitals:    05/28/17 1552 05/28/17 1939 05/29/17 0348 05/29/17 0720   BP: 110/70 118/80 101/66 90/52   Pulse: 106 108 87 80   Temp: 36.9 °C (98.5 °F) 36.6 °C (97.9 °F) 36.4 °C (97.5 °F) 36.1 °C (97 °F)   Resp: 18 16 16 16   Height:       Weight:       SpO2: 96% 100% 95% 96%     Body mass index is 22.45 kg/(m^2).    Oxygen Therapy:  Pulse Oximetry: 96 %, O2 (LPM): 0, O2 Delivery: None (Room Air)    Physical Exam   Constitutional: She is oriented to person, place, and time and well-developed, well-nourished, and in no distress. No distress.   HENT:   Head: Normocephalic and atraumatic.   Eyes: Pupils are equal, round, and reactive to light.   Neck: Neck supple.   Cardiovascular: Normal rate and regular rhythm.  Exam reveals no gallop and no friction rub.    No murmur heard.  Pulmonary/Chest: Effort normal. No respiratory distress. She has no wheezes.   Abdominal: Bowel sounds are normal. She exhibits no distension. There is no tenderness.   Musculoskeletal: Normal range of motion. She exhibits no edema or tenderness.   Neurological: She is alert and oriented to person, place, and time.   Skin: She is not diaphoretic.         Lab Data Review:      5/27/2017  12:26 PM    Recent Labs      05/27/17   0536   SODIUM  136   POTASSIUM  3.7   CHLORIDE  107   CO2  23   BUN  6*   CREATININE  0.58   CALCIUM  8.9       Recent Labs      05/27/17   0536   GLUCOSE  108*       Recent Labs      05/27/17 0536   RBC  3.78*   HEMOGLOBIN  11.2*   HEMATOCRIT  34.2*   PLATELETCT  237   IRON  33*   FERRITIN  29.2   TOTIRONBC  307       Recent Labs      05/27/17 0536    WBC  5.2   NEUTSPOLYS  46.90   LYMPHOCYTES  30.10   MONOCYTES  11.50   EOSINOPHILS  7.90*   BASOPHILS  1.80           Assessment/Plan     * Drug overdose (present on admission)  Assessment & Plan  -S/P intubation and mechanical ventilation. Intubated on the 22nd and extubated on the 25th.  -Urine drug screen positive for methamphetamine.  -Possible overdose on home medications including Seroquel and haloperidol.  -Currently stable.  -on Seroquel per psychiatry started on 5/25.  -Legal hold>>psych facility.     Schizoaffective disorder (CMS-[facility]) (present on admission)  Assessment & Plan  -Psych following  -Patient on legal hold.  -Seroquel 200 twice a day.  - psych facility placement.     Iron deficiency anemia (present on admission)  Assessment & Plan  -Iron studies showed iron 33, TIBC 307, iron saturation 11. Reticulocyte count 1.6.  -Hemoglobin is 11.2 (stable).  -No active bleeding.  -start on iron tablets 5/27.  - Follow up

## 2017-05-29 NOTE — PROGRESS NOTES
"Pt. A/ox4, VSS, close observation in place, pt. Denies SI today. Says, \" I am feeling a lot better\". Went on several walks to garden today with RN and CNA . Took shower, very independent with ADL's. Discussed plan of care with pt. And questions answered.   "

## 2017-05-30 VITALS
WEIGHT: 134.92 LBS | TEMPERATURE: 98.2 F | BODY MASS INDEX: 22.48 KG/M2 | RESPIRATION RATE: 16 BRPM | HEART RATE: 89 BPM | SYSTOLIC BLOOD PRESSURE: 102 MMHG | OXYGEN SATURATION: 96 % | HEIGHT: 65 IN | DIASTOLIC BLOOD PRESSURE: 47 MMHG

## 2017-05-30 LAB
ANION GAP SERPL CALC-SCNC: 6 MMOL/L (ref 0–11.9)
BUN SERPL-MCNC: 16 MG/DL (ref 8–22)
CALCIUM SERPL-MCNC: 9.1 MG/DL (ref 8.5–10.5)
CHLORIDE SERPL-SCNC: 106 MMOL/L (ref 96–112)
CO2 SERPL-SCNC: 26 MMOL/L (ref 20–33)
CREAT SERPL-MCNC: 0.63 MG/DL (ref 0.5–1.4)
ERYTHROCYTE [DISTWIDTH] IN BLOOD BY AUTOMATED COUNT: 45.1 FL (ref 35.9–50)
GFR SERPL CREATININE-BSD FRML MDRD: >60 ML/MIN/1.73 M 2
GLUCOSE SERPL-MCNC: 112 MG/DL (ref 65–99)
HCT VFR BLD AUTO: 32.2 % (ref 37–47)
HGB BLD-MCNC: 10.6 G/DL (ref 12–16)
MCH RBC QN AUTO: 29.1 PG (ref 27–33)
MCHC RBC AUTO-ENTMCNC: 32.9 G/DL (ref 33.6–35)
MCV RBC AUTO: 88.5 FL (ref 81.4–97.8)
PLATELET # BLD AUTO: 269 K/UL (ref 164–446)
PMV BLD AUTO: 9.5 FL (ref 9–12.9)
POTASSIUM SERPL-SCNC: 3.4 MMOL/L (ref 3.6–5.5)
RBC # BLD AUTO: 3.64 M/UL (ref 4.2–5.4)
SODIUM SERPL-SCNC: 138 MMOL/L (ref 135–145)
WBC # BLD AUTO: 4.9 K/UL (ref 4.8–10.8)

## 2017-05-30 PROCEDURE — A9270 NON-COVERED ITEM OR SERVICE: HCPCS | Performed by: INTERNAL MEDICINE

## 2017-05-30 PROCEDURE — A9270 NON-COVERED ITEM OR SERVICE: HCPCS | Performed by: HOSPITALIST

## 2017-05-30 PROCEDURE — 36415 COLL VENOUS BLD VENIPUNCTURE: CPT

## 2017-05-30 PROCEDURE — 700102 HCHG RX REV CODE 250 W/ 637 OVERRIDE(OP): Performed by: HOSPITALIST

## 2017-05-30 PROCEDURE — 80048 BASIC METABOLIC PNL TOTAL CA: CPT

## 2017-05-30 PROCEDURE — 85027 COMPLETE CBC AUTOMATED: CPT

## 2017-05-30 PROCEDURE — 700102 HCHG RX REV CODE 250 W/ 637 OVERRIDE(OP): Performed by: INTERNAL MEDICINE

## 2017-05-30 PROCEDURE — 99238 HOSP IP/OBS DSCHRG MGMT 30/<: CPT | Mod: GC | Performed by: HOSPITALIST

## 2017-05-30 RX ADMIN — FERROUS GLUCONATE 324 MG: 324 TABLET ORAL at 10:02

## 2017-05-30 RX ADMIN — THERA TABS 1 TABLET: TAB at 10:02

## 2017-05-30 RX ADMIN — Medication 100 MG: at 10:02

## 2017-05-30 ASSESSMENT — ENCOUNTER SYMPTOMS
COUGH: 0
HEARTBURN: 0
NECK PAIN: 0
HEADACHES: 0
MYALGIAS: 0
VOMITING: 0
FOCAL WEAKNESS: 0
WEAKNESS: 0
HEMOPTYSIS: 0
BLURRED VISION: 0
DIZZINESS: 0
CHILLS: 0
DOUBLE VISION: 0
FEVER: 0
PALPITATIONS: 0
NAUSEA: 0

## 2017-05-30 ASSESSMENT — PAIN SCALES - GENERAL: PAINLEVEL_OUTOF10: 0

## 2017-05-30 NOTE — CARE PLAN
Problem: Psychosocial Needs:  Goal: Level of anxiety will decrease  Intervention: Collaborate with Interdisciplinary Team including Psychologist/Behavioral Health Team  Pt on legal hold, Q15 min check in place.

## 2017-05-30 NOTE — PROGRESS NOTES
"Assumed care, assessment complete.  Report received from day shift RN. At 1930 pt requested to go to [facility] stating, \"It helps with my anxiety\". RN walked to garden with pt. Pt A&Ox4. Pt on legal hold. Pt denies pain at this time.  Pt educated on hourly rounding and phone extension numbers. Pt board has been updated. Pt denies any additional needs at this time.  Bed alarm not indicated, call light and phone within reach.        "

## 2017-05-30 NOTE — PROGRESS NOTES
Late entry;   At approx 1540 This RN rounded on patient, patient was not in room or restroom. Patient on legal hold, q15 obs in place. Security was notified and code purple initiated. Unable to locate patient. Pt's mother called and states she has not heard from patient and she will call back if she hears anything. [employer] contacted by charge RN. Attending physician Dr. Amanda notified of elopement. Dr. Arroyo of psychiatry notified of elopement.

## 2017-05-30 NOTE — PROGRESS NOTES
[facility] [place] Internal Medicine Interval Note    Name Mago Bowman 1990   Age/Sex 26 y.o. female   MRN [id number]   Code Status  full      After 5PM or if no immediate response to page, please call for cross-coverage  Attending/Team: Marti Call [phone] to page   1st Call - Day Intern (R1):   Dr. Crawford 2nd Call - Day Sr. Resident (R2/R3):   Dr. Graves         Chief complaint/ reason for interval visit (Primary Diagnosis)   Altered mental status/drug over dose.    Interval Problem Daily Status Update  :  Was admitted on  to ICU>>>transferred to floor .     Stable no complaints, the patient is medically stable and we are waiting for psych reevaluate.   On Legal hold.   Waiting for Psych facility placement.         Principal Problem:    Drug overdose POA: Yes  Active Problems:    Schizoaffective disorder (CMS-HCC) (Chronic) POA: Yes    Iron deficiency anemia POA: Yes      Overview: Stable. We'll start on iron replacement.    Bipolar 1 disorder (CMS-HCC) POA: Yes    PTSD (post-traumatic stress disorder) POA: Yes    Methamphetamine abuse POA: Yes  Resolved Problems:    Respiratory failure (CMS-HCC) POA: Yes    Drug poisoning POA: Yes    Acute encephalopathy POA: Yes    Fever POA: No    History of attempted suicide POA: No    Hypokalemia POA: Yes    Drug poisoning POA: Yes   resolved problems  Altered mental status    Review of Systems   Constitutional: Negative for fever, chills and malaise/fatigue.   HENT: Negative for congestion.    Eyes: Negative for blurred vision and double vision.   Respiratory: Negative for cough and hemoptysis.    Cardiovascular: Negative for chest pain and palpitations.   Gastrointestinal: Negative for heartburn, nausea and vomiting.   Genitourinary: Negative for dysuria and urgency.   Musculoskeletal: Negative for myalgias and neck pain.   Neurological: Negative for dizziness, focal weakness, weakness and headaches.        Consultants/Specialty  [facility]  Psychiatry    Disposition  Inpatient pending psychiatric clearance    Quality Measures  EKG reviewed, Labs reviewed, Medications reviewed and Radiology images reviewed  Mckenzie catheter: No Mckenzie      DVT Prophylaxis: Enoxaparin (Lovenox)    Ulcer prophylaxis: No and Not indicated    Assessed for rehab: Patient returned to prior level of function, rehabilitation not indicated at this time          Physical Exam       Filed Vitals:    05/29/17 1532 05/29/17 1945 05/30/17 0400 05/30/17 0644   BP: 110/73 115/70 96/58 102/47   Pulse: 100 104 92 89   Temp: 36.7 °C (98 °F) 37.1 °C (98.8 °F) 36.8 °C (98.2 °F) 36.8 °C (98.2 °F)   Resp: 16 18 18 16   Height:       Weight:       SpO2: 98% 99% 93% 96%     Body mass index is 22.45 kg/(m^2).    Oxygen Therapy:  Pulse Oximetry: 96 %, O2 (LPM): 0, O2 Delivery: None (Room Air)    Physical Exam   Constitutional: She is oriented to person, place, and time and well-developed, well-nourished, and in no distress. No distress.   HENT:   Head: Normocephalic and atraumatic.   Eyes: Pupils are equal, round, and reactive to light.   Neck: Neck supple.   Cardiovascular: Normal rate and regular rhythm.  Exam reveals no gallop and no friction rub.    No murmur heard.  Pulmonary/Chest: Effort normal. No respiratory distress. She has no wheezes.   Abdominal: Bowel sounds are normal. She exhibits no distension. There is no tenderness.   Musculoskeletal: Normal range of motion. She exhibits no edema or tenderness.   Neurological: She is alert and oriented to person, place, and time.   Skin: She is not diaphoretic.         Lab Data Review:      5/27/2017  12:26 PM    Recent Labs      05/30/17   0032   SODIUM  138   POTASSIUM  3.4*   CHLORIDE  106   CO2  26   BUN  16   CREATININE  0.63   CALCIUM  9.1       Recent Labs      05/30/17   0032   GLUCOSE  112*       Recent Labs      05/30/17   0032   RBC  3.64*   HEMOGLOBIN  10.6*   HEMATOCRIT  32.2*   PLATELETCT  269        Recent Labs      05/30/17   0032   WBC  4.9           Assessment/Plan     * Drug overdose (present on admission)  Assessment & Plan  -S/P intubation and mechanical ventilation. Intubated on the 22nd and extubated on the 25th.  -Urine drug screen positive for methamphetamine.  -Possible overdose on home medications including Seroquel and haloperidol.  -Currently stable.  -on Seroquel per psychiatry started on 5/25.  -Legal hold>>psych facility.     Schizoaffective disorder (CMS-[facility]) (present on admission)  Assessment & Plan  -Psych following  -Patient on legal hold.  -Seroquel 200 twice a day.  - psych facility placement.     Iron deficiency anemia (present on admission)  Assessment & Plan  -Iron studies showed iron 33, TIBC 307, iron saturation 11. Reticulocyte count 1.6.  -Hemoglobin is 11.2 (stable).  -No active bleeding.  -start on iron tablets 5/27.  - Follow up

## 2017-05-31 LAB — MISCELLANEOUS LAB RESULT MISCLAB: NORMAL

## 2017-06-03 NOTE — DISCHARGE SUMMARY
[facility] [place] [facility] Discharge Summary      Admit Date:  5/23/2017       Discharge Date:  5/30/17    Service:   R Internal Medicine [place] Team  Attending Physician(s):   Dr Fowler       Senior Resident(s):   Dr Graves  Rm Resident(s):   Dr Amanda      Primary Diagnosis:   Over dose drug/SI  Secondary Diagnoses:                Principal Problem:    Drug overdose POA: Yes  Active Problems:    Schizoaffective disorder (CMS-HCC) (Chronic) POA: Yes    Iron deficiency anemia POA: Yes      Overview: Stable. We'll start on iron replacement.    Bipolar 1 disorder (CMS-[facility]) POA: Yes    PTSD (post-traumatic stress disorder) POA: Yes    Methamphetamine abuse POA: Yes  Resolved Problems:    Respiratory failure (CMS-HCC) POA: Yes    Drug poisoning POA: Yes    Acute encephalopathy POA: Yes    Fever POA: No    History of attempted suicide POA: No    Hypokalemia POA: Yes    Drug poisoning POA: Yes      Hospital Summary (Brief Narrative):         26 y.o. female with PMH of amphetamine abuse /IVDA,  History of suicidal attempts. PTSD, schizoaffective disorder, was  brought in by care flight from [facility] for altered level of consciousness secondary to likely medication/polysubstance overdose.     Per EMS, the patient was found down outside of medical clinic where she was noted to be somnolent and intermittently unresponsive, with intermittent lateral gaze palsy. She  was given 1 mg of Narcan probably with no good response,  And patient ended up being intubated to protect airways, using etomidate, succinylcholine, rocuronium, and Versed. She got 1 L NS outside and given 1 more L of NS at [facility] ED.  Home medications include Seroquel, haloperidol, diazepam, lithium. Urine drug screen outside facility was positive for benzodiazepines,meth and amphetamines. K 3.4, Mg 1.9, APAP and Naif NGT,  Apparently, poison control was contacted at outside facility and supportive care with IVF, cardio monitoring and repeat Li was  recommended. EKG: NSR, HR 82, QTc 402.  Ct head and CXR did not show acute abnormalities.    Psychiatry was consulted and she was started on seroquel 200mg.   Patient was extubated on 5/25 and came to the tele floor on 5/26, she was stable and on meds by psych recommendation and medically was stable but on legal hold q15 obs and according to psych recommendation, we working with the family and SW for inpatient psych facility.     On 5/30 around 1540 the patient was not in room or restroom, the patient eloped, Security was notified and code purple initiated. Unable to locate patient. Pt's mother called and states she has not heard from patient and she will call back if she hears anything.    She was admitted here in ICU for the same 4/15-4/21; she was also intubated at that time.       Patient /Hospital Summary (Details -- Problem Oriented) :          * Drug overdose  Assessment & Plan  -S/P intubation and mechanical ventilation. Intubated on the 22nd and extubated on the 25th.  -Urine drug screen positive for methamphetamine.  -Possible overdose on home medications including Seroquel and haloperidol.  -Currently medically is stable.  -on Seroquel per psychiatry started on 5/25.  -Legal hold>>psych facility.     Schizoaffective disorder (CMS-[facility])  Assessment & Plan  -Psych following  -Patient on legal hold.  -Seroquel 200 twice a day.  - psych facility placement.     Iron deficiency anemia  Assessment & Plan  -Iron studies showed iron 33, TIBC 307, iron saturation 11. Reticulocyte count 1.6.  -Hemoglobin is 11.2 (stable).  -No active bleeding.  -start on iron tablets 5/27.  - Follow up      Consultants:     Psych     Procedures:        Intubation 5/22>>extubation 5/25    Imaging/ Testing:      DX-CHEST-PORTABLE (1 VIEW)   Final Result      No radiographic evidence of acute cardiopulmonary process.      Interval extubation      DX-CHEST-PORTABLE (1 VIEW)   Final Result      No radiographic evidence of acute  cardiopulmonary process.      DX-ABDOMEN FOR TUBE PLACEMENT   Final Result      Enteric tube placement with the tip projecting over the stomach body.      DX-CHEST-PORTABLE (1 VIEW)   Final Result      No significant change from prior exam.      DX-CHEST-LIMITED (1 VIEW)   Final Result         Line and tube placements as described above. No pneumothorax is noted. No consolidations are identified.      DX-CHEST-PORTABLE (1 VIEW)   Final Result      Clear chest.      OUTSIDE IMAGES-DX CHEST   Preliminary Result      OUTSIDE IMAGES-CT HEAD   Preliminary Result      OUTSIDE IMAGES-DX CHEST   Preliminary Result            Discharge Medications:           Medication List      ASK your doctor about these medications       Instructions    lithium carbonate 150 MG Caps   Commonly known as:  ESKALITH    Take 150 mg by mouth 2 Times a Day.   Dose:  150 mg       SEROQUEL 300 MG tablet   Last time this was given:  200 mg on 5/29/2017 10:08 PM   Generic drug:  quetiapine    Take 300 mg by mouth every bedtime.   Dose:  300 mg       VALIUM 10 MG tablet   Generic drug:  diazepam    Take 10 mg by mouth every 8 hours as needed for Anxiety.   Dose:  10 mg              Medication Reconciliation: Deferred        Disposition:   elopement     Diet:   Regular     Activity:   As tolerated     Instructions:       The patient was instructed to return to the ER in the event of worsening symptoms. I have counseled the patient on the importance of compliance and the patient has agreed to proceed with all medical recommendations and follow up plan indicated above.   The patient understands that all medications come with benefits and risks. Risks may include permanent injury or death and these risks can be minimized with close reassessment and monitoring.        Primary Care Provider:      Discharge summary faxed to primary care provider:  Deferred  Copy of discharge summary given to the patient: Deferred    Follow up appointment details :      The  patient eloped before schedule nikki with PCP.     Pending Studies:        none    Time spent on discharge day patient visit, preparing discharge paperwork and arranging for patient follow up.    Summary of follow up issues:   Psych       Condition on Discharge    Same day she eloped:   ______________________________________________________________________    Interval history/exam for day of discharge:    Medically stable.     Filed Vitals:    05/29/17 1532 05/29/17 1945 05/30/17 0400 05/30/17 0644   BP: 110/73 115/70 96/58 102/47   Pulse: 100 104 92 89   Temp: 36.7 °C (98 °F) 37.1 °C (98.8 °F) 36.8 °C (98.2 °F) 36.8 °C (98.2 °F)   Resp: 16 18 18 16   Height:       Weight:       SpO2: 98% 99% 93% 96%     Weight/BMI: Body mass index is 22.45 kg/(m^2).       Eloped but the physical exam on the day of elopement:     Physical Exam   Constitutional: She is oriented to person, place, and time and well-developed, well-nourished, and in no distress. No distress.   HENT:    Head: Normocephalic and atraumatic.   Eyes: Pupils are equal, round, and reactive to light.   Neck: Neck supple.   Cardiovascular: Normal rate and regular rhythm.  Exam reveals no gallop and no friction rub.     No murmur heard.  Pulmonary/Chest: Effort normal. No respiratory distress. She has no wheezes.   Abdominal: Bowel sounds are normal. She exhibits no distension. There is no tenderness.   Musculoskeletal: Normal range of motion. She exhibits no edema or tenderness.   Neurological: She is alert and oriented to person, place, and time.   Skin: She is not diaphoretic.       Most Recent Labs:    Lab Results   Component Value Date/Time    WBC 4.9 05/30/2017 12:32 AM    RBC 3.64* 05/30/2017 12:32 AM    HEMOGLOBIN 10.6* 05/30/2017 12:32 AM    HEMATOCRIT 32.2* 05/30/2017 12:32 AM    MCV 88.5 05/30/2017 12:32 AM    MCH 29.1 05/30/2017 12:32 AM    MCHC 32.9* 05/30/2017 12:32 AM    MPV 9.5 05/30/2017 12:32 AM    NEUTROPHILS-POLYS 46.90 05/27/2017 05:36 AM     LYMPHOCYTES 30.10 05/27/2017 05:36 AM    MONOCYTES 11.50 05/27/2017 05:36 AM    EOSINOPHILS 7.90* 05/27/2017 05:36 AM    BASOPHILS 1.80 05/27/2017 05:36 AM    ANISOCYTOSIS 1+ 05/27/2017 05:36 AM      Lab Results   Component Value Date/Time    SODIUM 138 05/30/2017 12:32 AM    POTASSIUM 3.4* 05/30/2017 12:32 AM    CHLORIDE 106 05/30/2017 12:32 AM    CO2 26 05/30/2017 12:32 AM    GLUCOSE 112* 05/30/2017 12:32 AM    BUN 16 05/30/2017 12:32 AM    CREATININE 0.63 05/30/2017 12:32 AM      Lab Results   Component Value Date/Time    ALT(SGPT) 7 05/23/2017 02:10 PM    AST(SGOT) 12 05/23/2017 02:10 PM    ALKALINE PHOSPHATASE 62 05/23/2017 02:10 PM    TOTAL BILIRUBIN 1.0 05/23/2017 02:10 PM    ALBUMIN 3.3 05/23/2017 02:10 PM    GLOBULIN 2.6 05/23/2017 02:10 PM    PRE-ALBUMIN 20.0 04/17/2017 06:00 AM    INR 1.18* 05/23/2017 02:10 PM     Lab Results   Component Value Date/Time    PT 15.4* 05/23/2017 02:10 PM    INR 1.18* 05/23/2017 02:10 PM

## 2018-11-18 ENCOUNTER — HOSPITAL ENCOUNTER (OUTPATIENT)
Dept: RADIOLOGY | Facility: MEDICAL CENTER | Age: 28
End: 2018-11-18

## 2018-11-18 ENCOUNTER — APPOINTMENT (OUTPATIENT)
Dept: RADIOLOGY | Facility: MEDICAL CENTER | Age: 28
DRG: 917 | End: 2018-11-18
Attending: INTERNAL MEDICINE
Payer: COMMERCIAL

## 2018-11-18 ENCOUNTER — APPOINTMENT (OUTPATIENT)
Dept: RADIOLOGY | Facility: MEDICAL CENTER | Age: 28
DRG: 917 | End: 2018-11-18
Attending: EMERGENCY MEDICINE
Payer: COMMERCIAL

## 2018-11-18 ENCOUNTER — HOSPITAL ENCOUNTER (INPATIENT)
Facility: MEDICAL CENTER | Age: 28
LOS: 3 days | DRG: 917 | End: 2018-11-21
Attending: EMERGENCY MEDICINE | Admitting: INTERNAL MEDICINE
Payer: COMMERCIAL

## 2018-11-18 DIAGNOSIS — T50.902A INTENTIONAL DRUG OVERDOSE, INITIAL ENCOUNTER (HCC): ICD-10-CM

## 2018-11-18 PROBLEM — E87.6 HYPOKALEMIA: Status: ACTIVE | Noted: 2018-11-18

## 2018-11-18 PROBLEM — J96.90 RESPIRATORY FAILURE REQUIRING INTUBATION (HCC): Status: ACTIVE | Noted: 2018-11-18

## 2018-11-18 PROBLEM — G92.9 TOXIC ENCEPHALOPATHY: Status: ACTIVE | Noted: 2018-11-18

## 2018-11-18 PROBLEM — J96.01 ACUTE HYPOXEMIC RESPIRATORY FAILURE (HCC): Status: ACTIVE | Noted: 2018-11-18

## 2018-11-18 LAB
ACTION RANGE TRIGGERED IACRT: NO
AMPHET UR QL SCN: NEGATIVE
ANION GAP SERPL CALC-SCNC: 9 MMOL/L (ref 0–11.9)
APAP SERPL-MCNC: <10 UG/ML (ref 10–30)
BARBITURATES UR QL SCN: NEGATIVE
BASE EXCESS BLDA CALC-SCNC: -6 MMOL/L (ref -4–3)
BASOPHILS # BLD AUTO: 0.3 % (ref 0–1.8)
BASOPHILS # BLD: 0.02 K/UL (ref 0–0.12)
BENZODIAZ UR QL SCN: POSITIVE
BODY TEMPERATURE: ABNORMAL DEGREES
BUN SERPL-MCNC: 10 MG/DL (ref 8–22)
BZE UR QL SCN: NEGATIVE
CALCIUM SERPL-MCNC: 7.7 MG/DL (ref 8.5–10.5)
CANNABINOIDS UR QL SCN: NEGATIVE
CHLORIDE SERPL-SCNC: 119 MMOL/L (ref 96–112)
CO2 BLDA-SCNC: 20 MMOL/L (ref 20–33)
CO2 SERPL-SCNC: 17 MMOL/L (ref 20–33)
CREAT SERPL-MCNC: 0.41 MG/DL (ref 0.5–1.4)
EKG IMPRESSION: NORMAL
EOSINOPHIL # BLD AUTO: 0 K/UL (ref 0–0.51)
EOSINOPHIL NFR BLD: 0 % (ref 0–6.9)
ERYTHROCYTE [DISTWIDTH] IN BLOOD BY AUTOMATED COUNT: 49.1 FL (ref 35.9–50)
ETHANOL BLD-MCNC: 0 G/DL
GLUCOSE SERPL-MCNC: 108 MG/DL (ref 65–99)
HCG UR QL: NEGATIVE
HCO3 BLDA-SCNC: 18.5 MMOL/L (ref 17–25)
HCT VFR BLD AUTO: 28.1 % (ref 37–47)
HGB BLD-MCNC: 8.9 G/DL (ref 12–16)
HOROWITZ INDEX BLDA+IHG-RTO: 525 MM[HG]
IMM GRANULOCYTES # BLD AUTO: 0.01 K/UL (ref 0–0.11)
IMM GRANULOCYTES NFR BLD AUTO: 0.1 % (ref 0–0.9)
INST. QUALIFIED PATIENT IIQPT: YES
LITHIUM SERPL-MCNC: <0.1 MMOL/L (ref 0.6–1.2)
LYMPHOCYTES # BLD AUTO: 1.1 K/UL (ref 1–4.8)
LYMPHOCYTES NFR BLD: 15.2 % (ref 22–41)
MCH RBC QN AUTO: 25.9 PG (ref 27–33)
MCHC RBC AUTO-ENTMCNC: 31.7 G/DL (ref 33.6–35)
MCV RBC AUTO: 81.7 FL (ref 81.4–97.8)
METHADONE UR QL SCN: NEGATIVE
MONOCYTES # BLD AUTO: 0.75 K/UL (ref 0–0.85)
MONOCYTES NFR BLD AUTO: 10.4 % (ref 0–13.4)
NEUTROPHILS # BLD AUTO: 5.34 K/UL (ref 2–7.15)
NEUTROPHILS NFR BLD: 74 % (ref 44–72)
NRBC # BLD AUTO: 0 K/UL
NRBC BLD-RTO: 0 /100 WBC
O2/TOTAL GAS SETTING VFR VENT: 40 %
OPIATES UR QL SCN: NEGATIVE
OXYCODONE UR QL SCN: NEGATIVE
PCO2 BLDA: 33.6 MMHG (ref 26–37)
PCP UR QL SCN: NEGATIVE
PH BLDA: 7.35 [PH] (ref 7.4–7.5)
PLATELET # BLD AUTO: 157 K/UL (ref 164–446)
PMV BLD AUTO: 10.3 FL (ref 9–12.9)
PO2 BLDA: 210 MMHG (ref 64–87)
POTASSIUM SERPL-SCNC: 3.7 MMOL/L (ref 3.6–5.5)
PROPOXYPH UR QL SCN: NEGATIVE
RBC # BLD AUTO: 3.44 M/UL (ref 4.2–5.4)
SALICYLATES SERPL-MCNC: 0 MG/DL (ref 15–25)
SAO2 % BLDA: 100 % (ref 93–99)
SODIUM SERPL-SCNC: 145 MMOL/L (ref 135–145)
SP GR UR REFRACTOMETRY: 1.01
SPECIMEN DRAWN FROM PATIENT: ABNORMAL
TRIGL SERPL-MCNC: 47 MG/DL (ref 0–149)
WBC # BLD AUTO: 7.2 K/UL (ref 4.8–10.8)

## 2018-11-18 PROCEDURE — 84478 ASSAY OF TRIGLYCERIDES: CPT

## 2018-11-18 PROCEDURE — 96367 TX/PROPH/DG ADDL SEQ IV INF: CPT

## 2018-11-18 PROCEDURE — 700111 HCHG RX REV CODE 636 W/ 250 OVERRIDE (IP): Performed by: INTERNAL MEDICINE

## 2018-11-18 PROCEDURE — 94002 VENT MGMT INPAT INIT DAY: CPT

## 2018-11-18 PROCEDURE — 93005 ELECTROCARDIOGRAM TRACING: CPT | Performed by: INTERNAL MEDICINE

## 2018-11-18 PROCEDURE — 82803 BLOOD GASES ANY COMBINATION: CPT

## 2018-11-18 PROCEDURE — 304561 HCHG STAT O2

## 2018-11-18 PROCEDURE — 36415 COLL VENOUS BLD VENIPUNCTURE: CPT

## 2018-11-18 PROCEDURE — 80178 ASSAY OF LITHIUM: CPT

## 2018-11-18 PROCEDURE — 81025 URINE PREGNANCY TEST: CPT

## 2018-11-18 PROCEDURE — 94150 VITAL CAPACITY TEST: CPT

## 2018-11-18 PROCEDURE — 700102 HCHG RX REV CODE 250 W/ 637 OVERRIDE(OP): Performed by: INTERNAL MEDICINE

## 2018-11-18 PROCEDURE — 303105 HCHG CATHETER EXTRA

## 2018-11-18 PROCEDURE — 99223 1ST HOSP IP/OBS HIGH 75: CPT | Performed by: INTERNAL MEDICINE

## 2018-11-18 PROCEDURE — 96375 TX/PRO/DX INJ NEW DRUG ADDON: CPT

## 2018-11-18 PROCEDURE — 770022 HCHG ROOM/CARE - ICU (200)

## 2018-11-18 PROCEDURE — 96368 THER/DIAG CONCURRENT INF: CPT

## 2018-11-18 PROCEDURE — 700111 HCHG RX REV CODE 636 W/ 250 OVERRIDE (IP)

## 2018-11-18 PROCEDURE — 71045 X-RAY EXAM CHEST 1 VIEW: CPT

## 2018-11-18 PROCEDURE — 96376 TX/PRO/DX INJ SAME DRUG ADON: CPT

## 2018-11-18 PROCEDURE — 700105 HCHG RX REV CODE 258: Performed by: INTERNAL MEDICINE

## 2018-11-18 PROCEDURE — 80048 BASIC METABOLIC PNL TOTAL CA: CPT

## 2018-11-18 PROCEDURE — 5A1935Z RESPIRATORY VENTILATION, LESS THAN 24 CONSECUTIVE HOURS: ICD-10-PCS | Performed by: SPECIALIST

## 2018-11-18 PROCEDURE — 99291 CRITICAL CARE FIRST HOUR: CPT

## 2018-11-18 PROCEDURE — 96365 THER/PROPH/DIAG IV INF INIT: CPT

## 2018-11-18 PROCEDURE — 99292 CRITICAL CARE ADDL 30 MIN: CPT | Performed by: INTERNAL MEDICINE

## 2018-11-18 PROCEDURE — 96366 THER/PROPH/DIAG IV INF ADDON: CPT

## 2018-11-18 PROCEDURE — 80307 DRUG TEST PRSMV CHEM ANLYZR: CPT

## 2018-11-18 PROCEDURE — 700101 HCHG RX REV CODE 250: Performed by: INTERNAL MEDICINE

## 2018-11-18 PROCEDURE — 99291 CRITICAL CARE FIRST HOUR: CPT | Performed by: INTERNAL MEDICINE

## 2018-11-18 PROCEDURE — A9270 NON-COVERED ITEM OR SERVICE: HCPCS | Performed by: INTERNAL MEDICINE

## 2018-11-18 PROCEDURE — 51702 INSERT TEMP BLADDER CATH: CPT

## 2018-11-18 PROCEDURE — 36600 WITHDRAWAL OF ARTERIAL BLOOD: CPT

## 2018-11-18 PROCEDURE — 85025 COMPLETE CBC W/AUTO DIFF WBC: CPT

## 2018-11-18 RX ORDER — MAGNESIUM SULFATE HEPTAHYDRATE 40 MG/ML
2 INJECTION, SOLUTION INTRAVENOUS ONCE
Status: COMPLETED | OUTPATIENT
Start: 2018-11-18 | End: 2018-11-18

## 2018-11-18 RX ORDER — FAMOTIDINE 20 MG/1
20 TABLET, FILM COATED ORAL 2 TIMES DAILY
Status: DISCONTINUED | OUTPATIENT
Start: 2018-11-18 | End: 2018-11-21 | Stop reason: HOSPADM

## 2018-11-18 RX ORDER — MIDAZOLAM HYDROCHLORIDE 1 MG/ML
INJECTION INTRAMUSCULAR; INTRAVENOUS
Status: COMPLETED
Start: 2018-11-18 | End: 2018-11-18

## 2018-11-18 RX ORDER — BISACODYL 10 MG
10 SUPPOSITORY, RECTAL RECTAL
Status: DISCONTINUED | OUTPATIENT
Start: 2018-11-18 | End: 2018-11-21 | Stop reason: HOSPADM

## 2018-11-18 RX ORDER — POLYETHYLENE GLYCOL 3350 17 G/17G
1 POWDER, FOR SOLUTION ORAL
Status: DISCONTINUED | OUTPATIENT
Start: 2018-11-18 | End: 2018-11-21 | Stop reason: HOSPADM

## 2018-11-18 RX ORDER — ZIPRASIDONE MESYLATE 20 MG/ML
10 INJECTION, POWDER, LYOPHILIZED, FOR SOLUTION INTRAMUSCULAR EVERY 4 HOURS PRN
Status: DISCONTINUED | OUTPATIENT
Start: 2018-11-18 | End: 2018-11-18

## 2018-11-18 RX ORDER — POTASSIUM CHLORIDE 7.45 MG/ML
10 INJECTION INTRAVENOUS
Status: DISCONTINUED | OUTPATIENT
Start: 2018-11-18 | End: 2018-11-18

## 2018-11-18 RX ORDER — FAMOTIDINE 20 MG/1
20 TABLET, FILM COATED ORAL EVERY 12 HOURS
Status: DISCONTINUED | OUTPATIENT
Start: 2018-11-18 | End: 2018-11-18

## 2018-11-18 RX ORDER — MIDAZOLAM HYDROCHLORIDE 1 MG/ML
1-4 INJECTION INTRAMUSCULAR; INTRAVENOUS
Status: DISCONTINUED | OUTPATIENT
Start: 2018-11-18 | End: 2018-11-18

## 2018-11-18 RX ORDER — HEPARIN SODIUM 5000 [USP'U]/ML
5000 INJECTION, SOLUTION INTRAVENOUS; SUBCUTANEOUS EVERY 8 HOURS
Status: DISCONTINUED | OUTPATIENT
Start: 2018-11-18 | End: 2018-11-18

## 2018-11-18 RX ORDER — AMOXICILLIN 250 MG
2 CAPSULE ORAL 2 TIMES DAILY
Status: DISCONTINUED | OUTPATIENT
Start: 2018-11-18 | End: 2018-11-21 | Stop reason: HOSPADM

## 2018-11-18 RX ADMIN — MIDAZOLAM HYDROCHLORIDE 4 MG: 1 INJECTION, SOLUTION INTRAMUSCULAR; INTRAVENOUS at 04:02

## 2018-11-18 RX ADMIN — HEPARIN SODIUM 5000 UNITS: 5000 INJECTION, SOLUTION INTRAVENOUS; SUBCUTANEOUS at 05:18

## 2018-11-18 RX ADMIN — POTASSIUM CHLORIDE: 2 INJECTION, SOLUTION, CONCENTRATE INTRAVENOUS at 15:19

## 2018-11-18 RX ADMIN — POTASSIUM CHLORIDE: 2 INJECTION, SOLUTION, CONCENTRATE INTRAVENOUS at 04:59

## 2018-11-18 RX ADMIN — MAGNESIUM SULFATE 2 G: 2 INJECTION INTRAVENOUS at 05:15

## 2018-11-18 RX ADMIN — MIDAZOLAM HYDROCHLORIDE 2 MG: 1 INJECTION, SOLUTION INTRAMUSCULAR; INTRAVENOUS at 02:32

## 2018-11-18 RX ADMIN — THIAMINE HYDROCHLORIDE 100 MG: 100 INJECTION, SOLUTION INTRAMUSCULAR; INTRAVENOUS at 05:16

## 2018-11-18 RX ADMIN — HEPARIN SODIUM 5000 UNITS: 5000 INJECTION, SOLUTION INTRAVENOUS; SUBCUTANEOUS at 14:50

## 2018-11-18 RX ADMIN — PROPOFOL 50 MCG/KG/MIN: 10 INJECTION, EMULSION INTRAVENOUS at 08:37

## 2018-11-18 RX ADMIN — FAMOTIDINE 20 MG: 20 TABLET, FILM COATED ORAL at 08:58

## 2018-11-18 RX ADMIN — MIDAZOLAM HYDROCHLORIDE 2 MG: 1 INJECTION, SOLUTION INTRAMUSCULAR; INTRAVENOUS at 02:47

## 2018-11-18 RX ADMIN — FOLIC ACID 1 MG: 5 INJECTION, SOLUTION INTRAMUSCULAR; INTRAVENOUS; SUBCUTANEOUS at 03:18

## 2018-11-18 RX ADMIN — PROPOFOL 50 MCG/KG/MIN: 10 INJECTION, EMULSION INTRAVENOUS at 12:37

## 2018-11-18 RX ADMIN — PROPOFOL 20 MCG/KG/MIN: 10 INJECTION, EMULSION INTRAVENOUS at 02:14

## 2018-11-18 RX ADMIN — SODIUM ACETATE: 3.28 INJECTION, SOLUTION, CONCENTRATE INTRAVENOUS at 03:21

## 2018-11-18 RX ADMIN — POTASSIUM BICARBONATE 25 MEQ: 25 TABLET, EFFERVESCENT ORAL at 08:58

## 2018-11-18 ASSESSMENT — PATIENT HEALTH QUESTIONNAIRE - PHQ9
3. TROUBLE FALLING OR STAYING ASLEEP OR SLEEPING TOO MUCH: NOT AT ALL
1. LITTLE INTEREST OR PLEASURE IN DOING THINGS: MORE THAN HALF THE DAYS
8. MOVING OR SPEAKING SO SLOWLY THAT OTHER PEOPLE COULD HAVE NOTICED. OR THE OPPOSITE, BEING SO FIGETY OR RESTLESS THAT YOU HAVE BEEN MOVING AROUND A LOT MORE THAN USUAL: NOT AT ALL
4. FEELING TIRED OR HAVING LITTLE ENERGY: SEVERAL DAYS
SUM OF ALL RESPONSES TO PHQ QUESTIONS 1-9: 8
2. FEELING DOWN, DEPRESSED, IRRITABLE, OR HOPELESS: MORE THAN HALF THE DAYS
SUM OF ALL RESPONSES TO PHQ9 QUESTIONS 1 AND 2: 4
9. THOUGHTS THAT YOU WOULD BE BETTER OFF DEAD, OR OF HURTING YOURSELF: SEVERAL DAYS
5. POOR APPETITE OR OVEREATING: NOT AT ALL
7. TROUBLE CONCENTRATING ON THINGS, SUCH AS READING THE NEWSPAPER OR WATCHING TELEVISION: MORE THAN HALF THE DAYS
6. FEELING BAD ABOUT YOURSELF - OR THAT YOU ARE A FAILURE OR HAVE LET YOURSELF OR YOUR FAMILY DOWN: NOT AL ALL

## 2018-11-18 ASSESSMENT — PULMONARY FUNCTION TESTS: FVC: 2.5

## 2018-11-18 ASSESSMENT — PAIN SCALES - GENERAL
PAINLEVEL_OUTOF10: 2
PAINLEVEL_OUTOF10: 3

## 2018-11-18 NOTE — ED PROVIDER NOTES
"ED Provider Note    CHIEF COMPLAINT  No chief complaint on file.  Overdose    HPI  Mago Bowman is a 27 y.o. female who presents as a transfer from [facility] after the patient had a significant overdose.  According to the records the patient took a large amount of Seroquel and subsequently deteriorated requiring intubation to protect her airway as the patient was heavily sedated.  She is also found to be intoxicated.  This occurred after an argument with family members the patient overdosed in an attempt to kill herself.  The patient presents intubated and she did receive sedation per EMS prior to my exam.  No further history could be obtained.    REVIEW OF SYSTEMS  See HPI for further details.  Unobtainable.     PAST MEDICAL HISTORY  Past Medical History:   Diagnosis Date   • Psychiatric disorder     RAD, schizo, bipolar.        SOCIAL HISTORY  Social History     Social History   • Marital status: Unknown     Spouse name: N/A   • Number of children: N/A   • Years of education: N/A     Social History Main Topics   • Smoking status: Unknown If Ever Smoked   • Smokeless tobacco: Not on file   • Alcohol use Yes   • Drug use: Yes   • Sexual activity: Not on file     Other Topics Concern   • Not on file     Social History Narrative   • No narrative on file           PHYSICAL EXAM  VITAL SIGNS: Ht 1.651 m (5' 5\")   Wt 60 kg (132 lb 4.4 oz)   BMI 22.01 kg/m²   Constitutional: Patient is intubated and sedated  HENT: Normocephalic, Atraumatic, tympanic membranes are intact and nonerythematous bilaterally, Oropharynx moist with oral tracheal tube in place, the patient also has a nasogastric tube in the right nostril  Eyes: PERRLA, EOMI, Conjunctiva normal.  Neck: Supple without meningismus  Lymphatic: No lymphadenopathy noted.   Cardiovascular: Normal heart rate, Normal rhythm, No murmurs, No rubs, No gallops.   Thorax & Lungs: Normal breath sounds, No respiratory distress, No wheezing, No chest tenderness. "   Abdomen: Bowel sounds normal, Soft, No tenderness, no rebound, no guarding, no distention, No masses, No pulsatile masses.   Skin: Warm, Dry, No erythema, No rash.   Back: No tenderness, No CVA tenderness.   Extremities: Atraumatic with symmetric distal pulses, No edema, No tenderness, No cyanosis, No clubbing.   Neurologic: The patient is currently moving all 4 extremities will not follow commands, she does not open her eyes, she is intubated       EKG interpretation twelve-lead EKG interpreted by myself shows a sinus tachycardia with a ventricular rate of 140, normal QRS, normal intervals, no ST segment elevation or depression, normal T waves.  COURSE & MEDICAL DECISION MAKING  Pertinent Labs & Imaging studies reviewed. (See chart for details)  This a 27-year-old female who presents the emerge department after an overdose.  I reviewed her workup including her metabolic panel, CBC, as well as tox screen.  She does not have any significant abnormalities.  Chest x-ray confirmed endotracheal tube placement.  Initially I did order chest x-ray as the stated chest x-ray was not performed but I did see the results and the endotracheal tube is in appropriate position.  The patient was switched over to propofol for sedation.  I contacted the pulmonologist for consultation and spoke with the hospitalist who has accepted the case.  I ordered an ABG to evaluate the patient's ventilation.  She continues to be tachycardic and therefore I also ordered another liter of fluid intravenously.  She did have a mild acidosis with a pH of 7.2 and this could be from the overdose versus poor ventilation.  The patient be admitted to the ICU in guarded condition.    FINAL IMPRESSION  1.  Overdose  2.  Critical care time 30 minutes       Disposition  The patient will be admitted in guarded condition    Electronically signed by: Jimmy Jean Baptiste, 11/18/2018 2:00 AM

## 2018-11-18 NOTE — ASSESSMENT & PLAN NOTE
Allegedly massive ingestion of Seroquel  Twelve-lead EKG showed a QTc interval of 458  Medically stable presently

## 2018-11-18 NOTE — PROGRESS NOTES
Poison Control Called: Case #: [id number]     Given medication profile - recommends a APAP, Lithium and Etoh level. Monitor for QTc prolongation.

## 2018-11-18 NOTE — DISCHARGE PLANNING
Alert Team Note: Informed by medical staff that patient will be admitted medically, therefore a behavioral health assessment in the ED is not warranted.    Loree Luque, Ph.D.  Alert Team Therapist

## 2018-11-18 NOTE — DISCHARGE PLANNING
Medical Social Work    Referral: Acute Medical Patient    Intervention: MSW responded to RD09.  Pt is a 27 year old female brought in by [facility] from [place] after an overdose.  Pt is Mago Bowman (: 1990).  Per [facility] pt's mom is not coming to the hospital until tomorrow but would like a call.  Pt's momGissel can be reached at: home [phone] or cell [phone].  MSW contacted pt's mom via phone and let her know that pt arrived.  MSW provided pt's mom with [facility] nurses station number, pt's room number and ER SW number.  Pt's mom will call in the morning to see what room pt is in.    Plan: SW will follow.

## 2018-11-18 NOTE — CONSULTS
"Critical Care/Pulmonary Consultation    Date of service: 11/18/2018    Consulting Physician: Aram Ross M.D.    Chief Complaint: Intentional overdose with Seroquel    History of Present Illness: Mago Bowman is a 27 y.o. female with a past medical history of schizoaffective disorder, PTSD, multiple suicidal attempts, methamphetamine abuse, was brought to the hospital after intentional overdose with Seroquel with alcohol intoxication.  She was orally endotracheally intubated in the field for airway protection and admitted to the hospital.  Patient is planned to be admitted to ICU, therefore ICU consult.    ROS: Unable to obtain because patient is sedated.    No current facility-administered medications on file prior to encounter.      Current Outpatient Prescriptions on File Prior to Encounter   Medication Sig Dispense Refill   • diazepam (VALIUM) 10 MG tablet Take 10 mg by mouth every 8 hours as needed for Anxiety.     • lithium carbonate (ESKALITH) 150 MG Cap Take 150 mg by mouth 2 Times a Day.     • quetiapine (SEROQUEL) 300 MG tablet Take 300 mg by mouth every bedtime.         Social History   Substance Use Topics   • Smoking status: Unknown If Ever Smoked   • Smokeless tobacco: Not on file   • Alcohol use Yes        Past Medical History:   Diagnosis Date   • Psychiatric disorder     RAD, schizo, bipolar.        No past surgical history on file.    Allergies: Lorazepam [ativan] and Sulfa drugs    No family history on file.    Vitals:    11/18/18 0158 11/18/18 0201 11/18/18 0208 11/18/18 0215   Height: 1.651 m (5' 5\")      Weight: 60 kg (132 lb 4.4 oz)      Weight % change since last entry.: 0 %      BP:  113/55     Pulse:  (!) 141  (!) 141   BMI (Calculated): 22.01      Resp:  18 18   Temp:   (!) 35.7 °C (96.3 °F)    TempSrc:   Temporal     11/18/18 0230 11/18/18 0245 11/18/18 0300 11/18/18 0315   Height:       Weight:       Weight % change since last entry.:       BP:       Pulse: (!) 142 (!) 138 (!) " 124 (!) 128   BMI (Calculated):       Resp: 14 19 19 19   Temp:       TempSrc:        11/18/18 0330 11/18/18 0335 11/18/18 0340 11/18/18 0345   Height:       Weight:       Weight % change since last entry.:       BP:       Pulse: (!) 127 (!) 131 (!) 124 (!) 128   BMI (Calculated):       Resp: 19 19 19 19   Temp:       TempSrc:        11/18/18 0350   Height:    Weight:    Weight % change since last entry.:    BP:    Pulse: (!) 123   BMI (Calculated):    Resp:    Temp:    TempSrc:        Physical Examination  General: Average built  Neuro/Psych: Sedated, moves all extremities, opens her eyes and withdraws extremities to pain, pupils 3-4 mm bilaterally reactive to light  HEENT: Head is normocephalic, atraumatic, PERRLA, EOMI  CVS: S1-S2 within normal limits, regular rate, no murmurs rubs or gallops, no peripheral lower extremities edema  Respiratory: Oral endotracheally intubated, symmetric expansion of the chest with respirations, trachea is midline  Abdomen/: Soft, nondistended abdomen, no guarding to palpation  Extremities: No bony deformities, joint swelling, joint erythema  Skin: No skin rashes, bruises, jaundice except old cutting marks in both legs        No results for input(s): SODIUM, POTASSIUM, CHLORIDE, CO2, BUN, CREATININE, MAGNESIUM, PHOSPHORUS, CALCIUM in the last 72 hours.  No results for input(s): ALTSGPT, ASTSGOT, ALKPHOSPHAT, TBILIRUBIN, DBILIRUBIN, GAMMAGT, AMYLASE, LIPASE, ALB, PREALBUMIN, GLUCOSE in the last 72 hours.      DX-CHEST-PORTABLE (1 VIEW)   Final Result      No acute cardiopulmonary abnormality.      OUTSIDE IMAGES-DX CHEST   Final Result      DX-CHEST-PORTABLE (1 VIEW)    (Results Pending)       Assessment & Plan        1.  Intentional overdose with Seroquel  -IV hydration  -History of multiple suicidal attempts  -History of schizoaffective disorder  -History of PTSD  -Inpatient psychiatric consultation pending  -Antipsychotics as needed    2.  Alcohol abuse  -Thiamine and  folate    3.  Toxic encephalopathy:  -Endotracheally intubated for airway protection  -Lung protective mechanical ventilation  -Follow-up ABG and adjust ventilator settings  -Extubate when encephalopathy improves/resolves    4.  ICU prophylaxis  -Heparin subcu    5.  ICU sedation  -Scheduled propofol and Versed as needed    Family meeting: Not available at bedside    Critical care time: I spent 30 minutes personally providing critical care services including formulating plan of care.  This time was exclusive to this patient and does not include time spent in procedures.

## 2018-11-18 NOTE — H&P
"Hospital Medicine History & Physical Note    Date of Service  11/18/2018    Primary Care Physician  No primary care provider on file.    Consultants  Pulmonology    Code Status  Full code    Chief Complaint  Drug overdose and respiratory distress    History of Presenting Illness  27 y.o. female with a past medical history of amphetamine abuse, IV drug abuse, PTSD, schizoaffective disorder, multiple suicide attempts who presented 11/18/2018 with intentional drug overdose attempt to kill herself.  History is limited as the patient is currently intubated.  Apparently the patient got into an argument with family and took approximately 70 pills of Seroquel to end her life.  She was taken to an outlying facility where she was found to be agitated and unable to protect her airways therefore she was intubated.  No family at bedside at this time.    EKG interpreted by me reveals sinus tachycardia.  No ST elevations noted  Chest x-ray interpreted by me reveals no acute cardiopulmonary process.  Review of Systems  Review of Systems   Unable to perform ROS: Intubated       Past Medical History   has a past medical history of Psychiatric disorder.  IV drug abuse.  Multiple suicide attempts    Surgical History  Unable to obtain at this time    Family History  Unable to obtain at this time    Social History   reports that she drinks alcohol. She reports that she uses drugs.    Allergies  Allergies   Allergen Reactions   • Lorazepam [Ativan] Anxiety     Pt becomes \"violent and crazy,\" per mother.   • Sulfa Drugs Rash     unknown       Medications  Prior to Admission Medications   Prescriptions Last Dose Informant Patient Reported? Taking?   diazepam (VALIUM) 10 MG tablet 5/22/2017 at Unknown time  Yes No   Sig: Take 10 mg by mouth every 8 hours as needed for Anxiety.   lithium carbonate (ESKALITH) 150 MG Cap 5/22/2017 at Unknown time  Yes No   Sig: Take 150 mg by mouth 2 Times a Day.   quetiapine (SEROQUEL) 300 MG tablet 5/22/2017 " at Unknown time  Yes No   Sig: Take 300 mg by mouth every bedtime.      Facility-Administered Medications: None       Physical Exam  Temp:  [35.7 °C (96.3 °F)] 35.7 °C (96.3 °F)  Pulse:  [123-142] 123  Resp:  [14-19] 19  BP: (113)/(55) 113/55    Physical Exam   Constitutional: She appears well-developed and well-nourished. No distress.   HENT:   Head: Normocephalic and atraumatic.   Mouth/Throat: Oropharynx is clear and moist.   Eyes: Pupils are equal, round, and reactive to light. Conjunctivae are normal.   Neck: Neck supple. No thyromegaly present.   Cardiovascular: Regular rhythm and normal heart sounds.    Tachycardic   Pulmonary/Chest: Effort normal and breath sounds normal. No respiratory distress. She has no wheezes. She has no rales.   Abdominal: Soft. Bowel sounds are normal. She exhibits no distension. There is no tenderness. There is no rebound.   Musculoskeletal: Normal range of motion. She exhibits no edema or tenderness.   Neurological:   Sedated and intubated  Moving all 4 extremities   Skin: Skin is warm and dry.   Psychiatric: She expresses suicidal ideation.   Nursing note and vitals reviewed.      Laboratory:          No results for input(s): ALTSGPT, ASTSGOT, ALKPHOSPHAT, TBILIRUBIN, DBILIRUBIN, GAMMAGT, AMYLASE, LIPASE, ALB, PREALBUMIN, GLUCOSE in the last 72 hours.          Recent Labs      11/18/18   0205   TRIGLYCERIDE  47     No results for input(s): TROPONINI in the last 72 hours.    Urinalysis:    No results found     Imaging:  DX-CHEST-PORTABLE (1 VIEW)   Final Result      No acute cardiopulmonary abnormality.      OUTSIDE IMAGES-DX CHEST   Final Result      DX-CHEST-PORTABLE (1 VIEW)    (Results Pending)         Assessment/Plan:  I anticipate this patient will require at least two midnights for appropriate medical management, necessitating inpatient admission.    Drug overdose, intentional self-harm, initial encounter (HCC)- (present on admission)   Assessment & Plan    Status post  intubation to protect airways.  Continuous cardiac monitoring  IV fluid hydration  Monitor BMP  Psych consultation has been placed     Respiratory failure requiring intubation (HCC)- (present on admission)   Assessment & Plan    S/p Intubation and mechanical ventilation  Pulmonology consulted     Toxic encephalopathy- (present on admission)   Assessment & Plan    Secondary to seroquel overdose  Supportive care  IV fluid hydration     Hypokalemia- (present on admission)   Assessment & Plan    Replace with IV KCl  Check mag  Monitor BMP     Schizoaffective disorder (HCC)- (present on admission)   Assessment & Plan    Psych has been consulted         VTE prophylaxis: heparin

## 2018-11-18 NOTE — CARE PLAN
Problem: Safety - Medical Restraint  Goal: Remains free of injury from restraints (Restraint for Interference with Medical Device)  INTERVENTIONS:  1. Determine that other, less restrictive measures have been tried or would not be effective before applying the restraint  2. Evaluate the patient's condition at the time of restraint application  3. Inform patient/family regarding the reason for restraint  4. Q2H: Monitor safety, psychosocial status, comfort, nutrition and hydration     Outcome: PROGRESSING AS EXPECTED  Monitor sites for skin integrity; monitor for release;
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
## 2018-11-18 NOTE — ED NOTES
Pt admitted to S118. Bedside report given. Pt transported by SICU RN, SICU tech and RT. VSS consistent. PT vented.

## 2018-11-18 NOTE — PROGRESS NOTES
12 hour chart check    Spoke with ED RN (at  0650) who had just spoken with Poison Control; case #[id number]  They have requested the following labs be drawn:  Pregnancy test  Tylenol  APaP  ETOH    A.M. RN has been informed and states understanding to secure orders from attending this a.m.

## 2018-11-18 NOTE — CARE PLAN
Problem: Infection  Goal: Will remain free from infection  Outcome: PROGRESSING AS EXPECTED  Utilized hand hygiene before and after patient contact. Accessed IV hubs/ports by scrubbing with alcohol swab for 30 seconds before using. Daily labs and vital signs assessed for signs of infections. Patient assessed for signs and symptoms of new onset or increasing infection. Mouth care provided every 2 hours. Hand wipes offered to patient to clean hands before handling food.     Problem: Venous Thromboembolism (VTW)/Deep Vein Thrombosis (DVT) Prevention:  Goal: Patient will participate in Venous Thrombosis (VTE)/Deep Vein Thrombosis (DVT)Prevention Measures  Outcome: PROGRESSING AS EXPECTED  Heparin ordered and administered per MAR, SCD's in place.

## 2018-11-18 NOTE — PSYCHIATRY
BRIEF PSYCHIATRIC CONSULT NOTE:    -Legal Hold:no hold as of now due to intubation  -Sitter:supervised while intubated, 1:1 when extubated per nursing staff discretion  -Restrictions: None at this time due to intubation  -Orders Placed:  None at the moment due to sedation and intubation    -Plan:  Psych will follow once patient is extubated and able to participate in evaluation  Please re-consult once patient is extubated

## 2018-11-18 NOTE — CARE PLAN
Problem: Knowledge Deficit  Goal: Knowledge of the prescribed therapeutic regimen will improve  Outcome: PROGRESSING AS EXPECTED  Provide education as necessary; reinforce safety techniques and plan of care

## 2018-11-18 NOTE — PROGRESS NOTES
2 RN skin check on admission:    Generalized bruising, redness, abrasions    Elbows red; blanchable    ET vent  NGtube  PIV's in place    Mepilex placed post bath.

## 2018-11-19 ENCOUNTER — APPOINTMENT (OUTPATIENT)
Dept: RADIOLOGY | Facility: MEDICAL CENTER | Age: 28
DRG: 917 | End: 2018-11-19
Attending: INTERNAL MEDICINE
Payer: COMMERCIAL

## 2018-11-19 PROBLEM — D64.9 ANEMIA: Status: ACTIVE | Noted: 2018-11-19

## 2018-11-19 PROBLEM — S22.49XA RIB FRACTURES: Status: ACTIVE | Noted: 2018-11-19

## 2018-11-19 LAB
ALBUMIN SERPL BCP-MCNC: 3.2 G/DL (ref 3.2–4.9)
ALBUMIN/GLOB SERPL: 1.2 G/DL
ALP SERPL-CCNC: 69 U/L (ref 30–99)
ALT SERPL-CCNC: <5 U/L (ref 2–50)
ANION GAP SERPL CALC-SCNC: 8 MMOL/L (ref 0–11.9)
AST SERPL-CCNC: 12 U/L (ref 12–45)
BASOPHILS # BLD AUTO: 0.4 % (ref 0–1.8)
BASOPHILS # BLD: 0.03 K/UL (ref 0–0.12)
BILIRUB SERPL-MCNC: 0.8 MG/DL (ref 0.1–1.5)
BUN SERPL-MCNC: 4 MG/DL (ref 8–22)
CALCIUM SERPL-MCNC: 8.4 MG/DL (ref 8.5–10.5)
CHLORIDE SERPL-SCNC: 110 MMOL/L (ref 96–112)
CO2 SERPL-SCNC: 23 MMOL/L (ref 20–33)
CREAT SERPL-MCNC: 0.58 MG/DL (ref 0.5–1.4)
EOSINOPHIL # BLD AUTO: 0.04 K/UL (ref 0–0.51)
EOSINOPHIL NFR BLD: 0.5 % (ref 0–6.9)
ERYTHROCYTE [DISTWIDTH] IN BLOOD BY AUTOMATED COUNT: 49.8 FL (ref 35.9–50)
GLOBULIN SER CALC-MCNC: 2.7 G/DL (ref 1.9–3.5)
GLUCOSE SERPL-MCNC: 108 MG/DL (ref 65–99)
HCT VFR BLD AUTO: 31.7 % (ref 37–47)
HGB BLD-MCNC: 10.2 G/DL (ref 12–16)
IMM GRANULOCYTES # BLD AUTO: 0.02 K/UL (ref 0–0.11)
IMM GRANULOCYTES NFR BLD AUTO: 0.2 % (ref 0–0.9)
LYMPHOCYTES # BLD AUTO: 1.78 K/UL (ref 1–4.8)
LYMPHOCYTES NFR BLD: 21.8 % (ref 22–41)
MAGNESIUM SERPL-MCNC: 1.6 MG/DL (ref 1.5–2.5)
MCH RBC QN AUTO: 26.1 PG (ref 27–33)
MCHC RBC AUTO-ENTMCNC: 32.2 G/DL (ref 33.6–35)
MCV RBC AUTO: 81.1 FL (ref 81.4–97.8)
MONOCYTES # BLD AUTO: 1.01 K/UL (ref 0–0.85)
MONOCYTES NFR BLD AUTO: 12.3 % (ref 0–13.4)
NEUTROPHILS # BLD AUTO: 5.3 K/UL (ref 2–7.15)
NEUTROPHILS NFR BLD: 64.8 % (ref 44–72)
NRBC # BLD AUTO: 0 K/UL
NRBC BLD-RTO: 0 /100 WBC
PHOSPHATE SERPL-MCNC: 2.8 MG/DL (ref 2.5–4.5)
PLATELET # BLD AUTO: 191 K/UL (ref 164–446)
PMV BLD AUTO: 9.6 FL (ref 9–12.9)
POTASSIUM SERPL-SCNC: 3.3 MMOL/L (ref 3.6–5.5)
PROT SERPL-MCNC: 5.9 G/DL (ref 6–8.2)
RBC # BLD AUTO: 3.91 M/UL (ref 4.2–5.4)
SODIUM SERPL-SCNC: 141 MMOL/L (ref 135–145)
WBC # BLD AUTO: 8.2 K/UL (ref 4.8–10.8)

## 2018-11-19 PROCEDURE — 700105 HCHG RX REV CODE 258: Performed by: INTERNAL MEDICINE

## 2018-11-19 PROCEDURE — 83735 ASSAY OF MAGNESIUM: CPT

## 2018-11-19 PROCEDURE — A9270 NON-COVERED ITEM OR SERVICE: HCPCS | Performed by: INTERNAL MEDICINE

## 2018-11-19 PROCEDURE — 85025 COMPLETE CBC W/AUTO DIFF WBC: CPT

## 2018-11-19 PROCEDURE — 700101 HCHG RX REV CODE 250: Performed by: INTERNAL MEDICINE

## 2018-11-19 PROCEDURE — 700102 HCHG RX REV CODE 250 W/ 637 OVERRIDE(OP): Performed by: INTERNAL MEDICINE

## 2018-11-19 PROCEDURE — 700111 HCHG RX REV CODE 636 W/ 250 OVERRIDE (IP): Performed by: INTERNAL MEDICINE

## 2018-11-19 PROCEDURE — 770001 HCHG ROOM/CARE - MED/SURG/GYN PRIV*

## 2018-11-19 PROCEDURE — 770020 HCHG ROOM/CARE - TELE (206)

## 2018-11-19 PROCEDURE — 84100 ASSAY OF PHOSPHORUS: CPT

## 2018-11-19 PROCEDURE — 71045 X-RAY EXAM CHEST 1 VIEW: CPT

## 2018-11-19 PROCEDURE — 80053 COMPREHEN METABOLIC PANEL: CPT

## 2018-11-19 PROCEDURE — 99233 SBSQ HOSP IP/OBS HIGH 50: CPT | Performed by: HOSPITALIST

## 2018-11-19 PROCEDURE — 99232 SBSQ HOSP IP/OBS MODERATE 35: CPT | Performed by: INTERNAL MEDICINE

## 2018-11-19 RX ORDER — MAGNESIUM SULFATE HEPTAHYDRATE 40 MG/ML
2 INJECTION, SOLUTION INTRAVENOUS ONCE
Status: COMPLETED | OUTPATIENT
Start: 2018-11-19 | End: 2018-11-19

## 2018-11-19 RX ORDER — POTASSIUM CHLORIDE 20 MEQ/1
40 TABLET, EXTENDED RELEASE ORAL 2 TIMES DAILY
Status: COMPLETED | OUTPATIENT
Start: 2018-11-19 | End: 2018-11-19

## 2018-11-19 RX ORDER — SODIUM CHLORIDE 9 MG/ML
INJECTION, SOLUTION INTRAVENOUS
Status: COMPLETED
Start: 2018-11-19 | End: 2018-11-20

## 2018-11-19 RX ORDER — BUPRENORPHINE HYDROCHLORIDE AND NALOXONE HYDROCHLORIDE DIHYDRATE 8; 2 MG/1; MG/1
1 TABLET SUBLINGUAL DAILY
COMMUNITY

## 2018-11-19 RX ORDER — BUPRENORPHINE HYDROCHLORIDE AND NALOXONE HYDROCHLORIDE DIHYDRATE 8; 2 MG/1; MG/1
1 TABLET SUBLINGUAL DAILY
Status: DISCONTINUED | OUTPATIENT
Start: 2018-11-19 | End: 2018-11-21 | Stop reason: HOSPADM

## 2018-11-19 RX ADMIN — AMPICILLIN SODIUM AND SULBACTAM SODIUM 3 G: 2; 1 INJECTION, POWDER, FOR SOLUTION INTRAMUSCULAR; INTRAVENOUS at 04:58

## 2018-11-19 RX ADMIN — AMPICILLIN SODIUM AND SULBACTAM SODIUM 3 G: 2; 1 INJECTION, POWDER, FOR SOLUTION INTRAMUSCULAR; INTRAVENOUS at 01:16

## 2018-11-19 RX ADMIN — BUPRENORPHINE HYDROCHLORIDE AND NALOXONE HYDROCHLORIDE DIHYDRATE 1 TABLET: 8; 2 TABLET SUBLINGUAL at 11:02

## 2018-11-19 RX ADMIN — POTASSIUM CHLORIDE 40 MEQ: 1500 TABLET, EXTENDED RELEASE ORAL at 09:31

## 2018-11-19 RX ADMIN — MAGNESIUM SULFATE 2 G: 2 INJECTION INTRAVENOUS at 09:31

## 2018-11-19 RX ADMIN — THIAMINE HYDROCHLORIDE 100 MG: 100 INJECTION, SOLUTION INTRAMUSCULAR; INTRAVENOUS at 04:57

## 2018-11-19 RX ADMIN — AMPICILLIN SODIUM AND SULBACTAM SODIUM 3 G: 2; 1 INJECTION, POWDER, FOR SOLUTION INTRAMUSCULAR; INTRAVENOUS at 18:52

## 2018-11-19 RX ADMIN — FOLIC ACID 1 MG: 5 INJECTION, SOLUTION INTRAMUSCULAR; INTRAVENOUS; SUBCUTANEOUS at 04:57

## 2018-11-19 RX ADMIN — FAMOTIDINE 20 MG: 20 TABLET, FILM COATED ORAL at 04:57

## 2018-11-19 RX ADMIN — AMPICILLIN SODIUM AND SULBACTAM SODIUM 3 G: 2; 1 INJECTION, POWDER, FOR SOLUTION INTRAMUSCULAR; INTRAVENOUS at 11:30

## 2018-11-19 RX ADMIN — POTASSIUM CHLORIDE 40 MEQ: 1500 TABLET, EXTENDED RELEASE ORAL at 14:58

## 2018-11-19 ASSESSMENT — ENCOUNTER SYMPTOMS
NAUSEA: 0
PALPITATIONS: 0
GASTROINTESTINAL NEGATIVE: 1
CHILLS: 0
BRUISES/BLEEDS EASILY: 0
NERVOUS/ANXIOUS: 1
DEPRESSION: 1
WEAKNESS: 1
HEARTBURN: 0
NECK PAIN: 0
FOCAL WEAKNESS: 0
COUGH: 1
BACK PAIN: 0
EYES NEGATIVE: 1
FEVER: 0
POLYDIPSIA: 0
VOMITING: 0
MUSCULOSKELETAL NEGATIVE: 1
DIZZINESS: 0
HEADACHES: 0

## 2018-11-19 ASSESSMENT — PAIN SCALES - GENERAL
PAINLEVEL_OUTOF10: 3
PAINLEVEL_OUTOF10: 1
PAINLEVEL_OUTOF10: 2
PAINLEVEL_OUTOF10: 2
PAINLEVEL_OUTOF10: 5
PAINLEVEL_OUTOF10: 1
PAINLEVEL_OUTOF10: 0
PAINLEVEL_OUTOF10: 2
PAINLEVEL_OUTOF10: 4
PAINLEVEL_OUTOF10: 2
PAINLEVEL_OUTOF10: 1
PAINLEVEL_OUTOF10: 6

## 2018-11-19 ASSESSMENT — COGNITIVE AND FUNCTIONAL STATUS - GENERAL
TOILETING: A LITTLE
WALKING IN HOSPITAL ROOM: A LITTLE
DAILY ACTIVITIY SCORE: 22
HELP NEEDED FOR BATHING: A LITTLE
SUGGESTED CMS G CODE MODIFIER DAILY ACTIVITY: CJ
MOBILITY SCORE: 21
SUGGESTED CMS G CODE MODIFIER MOBILITY: CJ
STANDING UP FROM CHAIR USING ARMS: A LITTLE
CLIMB 3 TO 5 STEPS WITH RAILING: A LITTLE

## 2018-11-19 ASSESSMENT — LIFESTYLE VARIABLES: EVER_SMOKED: YES

## 2018-11-19 ASSESSMENT — PATIENT HEALTH QUESTIONNAIRE - PHQ9
2. FEELING DOWN, DEPRESSED, IRRITABLE, OR HOPELESS: MORE THAN HALF THE DAYS
9. THOUGHTS THAT YOU WOULD BE BETTER OFF DEAD, OR OF HURTING YOURSELF: NOT AT ALL
4. FEELING TIRED OR HAVING LITTLE ENERGY: NOT AT ALL
3. TROUBLE FALLING OR STAYING ASLEEP OR SLEEPING TOO MUCH: NOT AT ALL
1. LITTLE INTEREST OR PLEASURE IN DOING THINGS: MORE THAN HALF THE DAYS
SUM OF ALL RESPONSES TO PHQ QUESTIONS 1-9: 7
SUM OF ALL RESPONSES TO PHQ9 QUESTIONS 1 AND 2: 4
5. POOR APPETITE OR OVEREATING: NOT AT ALL
6. FEELING BAD ABOUT YOURSELF - OR THAT YOU ARE A FAILURE OR HAVE LET YOURSELF OR YOUR FAMILY DOWN: SEVERAL DAYS
8. MOVING OR SPEAKING SO SLOWLY THAT OTHER PEOPLE COULD HAVE NOTICED. OR THE OPPOSITE, BEING SO FIGETY OR RESTLESS THAT YOU HAVE BEEN MOVING AROUND A LOT MORE THAN USUAL: NOT AT ALL
7. TROUBLE CONCENTRATING ON THINGS, SUCH AS READING THE NEWSPAPER OR WATCHING TELEVISION: MORE THAN HALF THE DAYS

## 2018-11-19 NOTE — CARE PLAN
Problem: Safety  Goal: Will remain free from injury  Outcome: PROGRESSING AS EXPECTED  Patient is on legal hold. 1:1 sitter in place. Bed alarm in use. Educating patient on not getting out of bed without RN.     Problem: Skin Integrity  Goal: Risk for impaired skin integrity will decrease  Outcome: PROGRESSING AS EXPECTED  2 RN skin check completed. Patient turns self. Appropriate pressure ulcer prevention interventions in place.

## 2018-11-19 NOTE — PROGRESS NOTES
Critical Care Progress Note    Date of admission  11/18/2018    Chief Complaint  27 y.o. female admitted 11/18/2018 with drug overdose.    Hospital Course    This lady was admitted to the ICU intubated after an acute drug overdose in a suicide attempt.    Interval Problem Update  Reviewed last 24 hour events:   - Overdosed on seroquel, psych pending   - Neuro: AOx4   - HR: SR   - SBP: 90s-110s   - GI: tolerating swallow study   - UOP: adequate   - Mckenzie: no   - Tm: afeb   - Lines: PIV   - PPx: GI diet, DVT lovenox   - RA   - Unasyn day 2/5    Yesterday  OD Seroquel  Will follow in UE  Moves all 4 spont  ST  NG to LCS  LR 20 KCl at 100  Acetate 150  Vent 1  Replete K  Stop acetate      Review of Systems  Review of Systems   Constitutional: Negative for chills and fever.        Diffuse pain   HENT: Positive for sore throat.    Respiratory: Negative for cough, sputum production, shortness of breath and stridor.    Cardiovascular: Negative for chest pain.   Gastrointestinal: Negative for abdominal pain, nausea and vomiting.   Genitourinary: Negative for dysuria.   Musculoskeletal: Negative for myalgias.   Skin: Negative for rash.   Neurological: Negative for dizziness, sensory change and focal weakness.        Vital Signs for last 24 hours   Pulse:  [] 100  Resp:  [13-33] 23   SpO2: 96%    Hemodynamic parameters for last 24 hours       Respiratory  Key Vent Mode: Spont  Rate (breaths/min): 15  Vt Target (mL): 360  PEEP/CPAP: 8  FiO2: 30  P Support: 5  P MEAN: 10  Control VTE (exp VT): 824    Physical Exam   Physical Exam   Constitutional: She is oriented to person, place, and time.   HENT:   Head: Normocephalic.   Right Ear: External ear normal.   Left Ear: External ear normal.   Nose: Nose normal.   Mouth/Throat: Oropharynx is clear and moist.   Eyes: Pupils are equal, round, and reactive to light. Right eye exhibits no discharge. Left eye exhibits no discharge. No scleral icterus.   Neck: Neck supple. No  JVD present. No tracheal deviation present.   Cardiovascular: Normal rate, regular rhythm and intact distal pulses.  Exam reveals no gallop and no friction rub.    No murmur heard.  Sinus rhythm   Pulmonary/Chest: She has no wheezes. She has rales (Coarse crackles bilaterally).   Abdominal: Soft. Bowel sounds are normal. She exhibits no distension. There is no tenderness. There is no rebound.   Musculoskeletal: She exhibits no tenderness or deformity.   No clubbing or cyanosis   Neurological: She is alert and oriented to person, place, and time. No cranial nerve deficit. She exhibits normal muscle tone. Coordination normal.   Skin: Skin is warm and dry. No rash noted. She is not diaphoretic.   Psychiatric: Her affect is angry (wants to see psych about her mother visiting). She is agitated.   Nursing note and vitals reviewed.      Medications  Current Facility-Administered Medications   Medication Dose Route Frequency Provider Last Rate Last Dose   • Respiratory Care per Protocol   Nebulization Continuous RT Alexis Thompson M.D.       • senna-docusate (PERICOLACE or SENOKOT S) 8.6-50 MG per tablet 2 Tab  2 Tab Oral BID Alexis Thompson M.D.   Stopped at 11/18/18 0600    And   • polyethylene glycol/lytes (MIRALAX) PACKET 1 Packet  1 Packet Oral QDAY PRN Alexis Thompson M.D.        And   • magnesium hydroxide (MILK OF MAGNESIA) suspension 30 mL  30 mL Oral QDAY PRN Alexis Thompson M.D.        And   • bisacodyl (DULCOLAX) suppository 10 mg  10 mg Rectal QDAY PRN Alexis Thompson M.D.       • MD Alert...ICU Electrolyte Replacement per Pharmacy   Other pharmacy to dose Alexis Thompson M.D.       • thiamine (B-1) 100 mg in D5W 100 mL IVPB  100 mg Intravenous DAILY Alexis Thompson M.D. 200 mL/hr at 11/19/18 0457 100 mg at 11/19/18 0457   • folic acid 1 mg in NS 50 mL IVPB  1 mg Intravenous Q24HRS Alexis Thompson M.D. 100 mL/hr at 11/19/18 0457 1 mg at 11/19/18 0457   • potassium chloride 20 mEq in LR 1,000 mL  infusion   Intravenous Continuous Alexis Thomspon M.D. 100 mL/hr at 11/18/18 1519     • famotidine (PEPCID) tablet 20 mg  20 mg Oral BID Vicente Murphy M.D.   20 mg at 11/19/18 0457   • enoxaparin (LOVENOX) inj 40 mg  40 mg Subcutaneous DAILY Vicente Murphy M.D.   Stopped at 11/19/18 0600   • ampicillin/sulbactam (UNASYN) 3 g in  mL IVPB  3 g Intravenous Q6HRS Vicente Murphy M.D.   3 g at 11/19/18 0458       Fluids    Intake/Output Summary (Last 24 hours) at 11/19/18 0753  Last data filed at 11/19/18 0600   Gross per 24 hour   Intake          2947.25 ml   Output             2075 ml   Net           872.25 ml       Laboratory  Recent Labs      11/18/18   0441   ISTATAPH  7.349*   ISTATAPCO2  33.6   ISTATAPO2  210*   ISTATATCO2  20   [id number]  100*   ISTATARTHCO3  18.5   ISTATARTBE  -6*   ISTATTEMP  see below   ISTATFIO2  40   ISTATSPEC  Arterial         Recent Labs      11/18/18   0505  11/19/18   0415   SODIUM  145  141   POTASSIUM  3.7  3.3*   CHLORIDE  119*  110   CO2  17*  23   BUN  10  4*   CREATININE  0.41*  0.58   MAGNESIUM   --   1.6   PHOSPHORUS   --   2.8   CALCIUM  7.7*  8.4*     Recent Labs      11/18/18   0505  11/19/18   0415   ALTSGPT   --   <5   ASTSGOT   --   12   ALKPHOSPHAT   --   69   TBILIRUBIN   --   0.8   GLUCOSE  108*  108*     Recent Labs      11/18/18   0505  11/19/18   0415   WBC  7.2  8.2   NEUTSPOLYS  74.00*  64.80   LYMPHOCYTES  15.20*  21.80*   MONOCYTES  10.40  12.30   EOSINOPHILS  0.00  0.50   BASOPHILS  0.30  0.40   ASTSGOT   --   12   ALTSGPT   --   <5   ALKPHOSPHAT   --   69   TBILIRUBIN   --   0.8     Recent Labs      11/18/18   0505  11/19/18   0415   RBC  3.44*  3.91*   HEMOGLOBIN  8.9*  10.2*   HEMATOCRIT  28.1*  31.7*   PLATELETCT  157*  191       Imaging  X-Ray:  I have personally reviewed the images and compared with prior images. and My impression is: Clear lungs    Assessment/Plan  * Acute hypoxemic respiratory failure (HCC)- (present on  admission)   Assessment & Plan    Intubated 11/18-11/18 for airway protection  RT/O2 Protocols  Titrate supplemental FiO2 to maintain SpO2 >88%     Drug overdose, intentional self-harm, initial encounter (HCC)- (present on admission)   Assessment & Plan    Intentional Seroquel overdose  Mental status at baseline  Continue legal hold  Awaiting psychiatry consultation     Toxic encephalopathy- (present on admission)   Assessment & Plan    Due to acute Seroquel overdose     Chronic pain   Assessment & Plan    Restart home suboxone  Monitor for sedation     Hypokalemia- (present on admission)   Assessment & Plan    Replete potassium     Fever- (present on admission)   Assessment & Plan    Suspect possible aspiration  Culture sputum  Start Unasyn, 3 g IV every 6 hours for 5 days     Schizoaffective disorder (HCC)- (present on admission)   Assessment & Plan    History of schizoaffective disorder, PTSD and multiple prior suicide attempts          VTE:  Lovenox  Ulcer: H2 Antagonist  Lines: Mckenzie Catheter  Ongoing indication addressed    I have performed a physical exam and reviewed and updated ROS and Plan today (11/19/2018). In review of yesterday's note (11/18/2018), there are no changes except as documented above.    Stable to transfer patient out of ICU today. [facility] will sign off at transfer. Please call with questions.    Discussed patient condition and risk of morbidity and/or mortality with Hospitalist, RN, RT, Pharmacy, , Charge nurse / hot rounds and Patient

## 2018-11-19 NOTE — CARE PLAN
Problem: Knowledge Deficit  Goal: Knowledge of disease process/condition, treatment plan, diagnostic tests, and medications will improve  Outcome: PROGRESSING AS EXPECTED  Plan of care provided; treatment, medication and safety education to ensure understanding of hospitalization

## 2018-11-19 NOTE — RESPIRATORY CARE
Extubation    Cuff leak noted Yes  Stridor present No     FiO2%: 30 % (11/18/18 1800)        Patient toleration Well, no complications.  RCP Complete? Held   Events/Summary/Plan: Pt extubated per MD request to 2L NC, no complications (11/18/18 1850)

## 2018-11-19 NOTE — PROGRESS NOTES
2 RN skin check update with changes       Generalized bruising, redness, abrasions, and tattoos     Elbows red; blanchable     PIV's in place    Scars on thighs and arms

## 2018-11-19 NOTE — ASSESSMENT & PLAN NOTE
Intubated 11/18-11/18 for airway protection  RT/O2 Protocols  Titrate supplemental FiO2 to maintain SpO2 >88%

## 2018-11-19 NOTE — DISCHARGE PLANNING
"Care Transition Team Assessment    In the case of an emergency, pt's legal NOK is Gissel Solis (mom) [phone].    LSW met with pt at bedside and obtained the information used in this assessment. Pt verified accuracy of facesheet. Pt's physical address is [address] [place] [address] [address] [place], [place]. Pt lives with her mom and her mom's wife in a trailer. Prior to current hospitalization, pt was completely independent in ADLS/IADLS. Pt does not drive but can take public transportation or ask a friend for a ride.  Pt earns approximately $920 a month by [employer]. . Pt has a limited support system, pt described her relationship with her mother as \"[place] roulette.\" She stated that sometimes her and mother get along and have fun together and other times when they are drinking, they get into it and start yelling and get into bad fights.  Pt stated that she was previously using Heroine but has been on Suboxin for the past two months and has not used. Pt stated that she does drink alcohol but only every couple weeks when her and her mother drink together. Pt stated that she has been dx with Bipolar and PTSD but does not take medications regularly. Pt stated that she has a bf but that he is getting ready to go to [facility] on 11/30 for Felony Domestic Violence but not against pt. Pt stated that when she took the pills that she \"did not want to die but just wanted to go to sleep and shut her brain off.\"     Pt stated that she did not want to return to [place] at this time as she feels \"[place] is a trigger\" for her. Pt stated that she feels like she needs a break and a couple weeks away from [place]. [facility]. LSW stated that a psychiatrist would need to see pt first and determine if legal hold will be continued or not. Pt stated that she has previously been to [place]. Pt had questions in regards to her mother coming to visit her geraldo. LSW stated to speak with psychiatrist about this as a Legal Hold is usually no " visitors. KRISTOPHERW told pt that she will contact women's shelter and determine if they have any beds and obtain additional information on the shelter and what services are provided.        Information Source  Orientation : Oriented x 4  Information Given By: Patient  Informant's Name:  (Mago)  Who is responsible for making decisions for patient? : Patient    Readmission Evaluation  Is this a readmission?: No    Elopement Risk  Legal Hold: Elopement Risk  Ambulatory or Self Mobile in Wheelchair: No-Not an Elopement Risk  Time of Legal Hold: 0755  Date of Legal Hold: 11/19/18  Elopement Risk: Not at Risk for Elopement    Interdisciplinary Discharge Planning  Patient or legal guardian wants to designate a caregiver (see row info): No    Discharge Preparedness  What is your plan after discharge?: Uncertain - pending medical team collaboration  What are your discharge supports?: Parent  Prior Functional Level: Ambulatory, Drives Self, Independent with Activities of Daily Living, Independent with Medication Management  Difficulity with ADLs: None  Difficulity with IADLs: None    Functional Assesment  Prior Functional Level: Ambulatory, Drives Self, Independent with Activities of Daily Living, Independent with Medication Management    Finances  Financial Barriers to Discharge: No  Prescription Coverage: Yes    Vision / Hearing Impairment  Vision Impairment : No  Hearing Impairment : No    Values / Beliefs / Concerns  Values / Beliefs Concerns : No    Advance Directive  Advance Directive?: None    Domestic Abuse  Have you ever been the victim of abuse or violence?: No  Verbal Abuse or Emotional Abuse: No  Possible Abuse Reported to:: Not Applicable    Psychological Assessment  History of Substance Abuse: Alcohol, Heroin  History of Psychiatric Problems: Yes  Non-compliant with Treatment: No  Newly Diagnosed Illness: No    Discharge Risks or Barriers  Discharge risks or barriers?: Uninsured / underinsured, Substance abuse,  Post-acute placement / services, Complex medical needs  Patient risk factors: Complex medical needs, Lack of outside supports, Substance abuse    Anticipated Discharge Information  Anticipated discharge disposition: Discharge needs currently unknown, Psychiatric admission - voluntary

## 2018-11-19 NOTE — CARE PLAN
Problem: Safety  Goal: Will remain free from injury  Outcome: PROGRESSING AS EXPECTED  Bed in low position; wheels locked; safety education provided to encourage safe mobilization

## 2018-11-20 PROBLEM — J96.01 ACUTE HYPOXEMIC RESPIRATORY FAILURE (HCC): Status: RESOLVED | Noted: 2018-11-18 | Resolved: 2018-11-20

## 2018-11-20 PROBLEM — G89.29 CHRONIC PAIN: Status: ACTIVE | Noted: 2018-11-20

## 2018-11-20 LAB
ALBUMIN SERPL BCP-MCNC: 3.3 G/DL (ref 3.2–4.9)
ALBUMIN/GLOB SERPL: 1.1 G/DL
ALP SERPL-CCNC: 76 U/L (ref 30–99)
ALT SERPL-CCNC: 7 U/L (ref 2–50)
ANION GAP SERPL CALC-SCNC: 6 MMOL/L (ref 0–11.9)
ANION GAP SERPL CALC-SCNC: 8 MMOL/L (ref 0–11.9)
AST SERPL-CCNC: 10 U/L (ref 12–45)
BASOPHILS # BLD AUTO: 0.4 % (ref 0–1.8)
BASOPHILS # BLD: 0.04 K/UL (ref 0–0.12)
BILIRUB SERPL-MCNC: 0.7 MG/DL (ref 0.1–1.5)
BUN SERPL-MCNC: 12 MG/DL (ref 8–22)
BUN SERPL-MCNC: 12 MG/DL (ref 8–22)
CALCIUM SERPL-MCNC: 8.9 MG/DL (ref 8.5–10.5)
CALCIUM SERPL-MCNC: 9.2 MG/DL (ref 8.5–10.5)
CHLORIDE SERPL-SCNC: 103 MMOL/L (ref 96–112)
CHLORIDE SERPL-SCNC: 104 MMOL/L (ref 96–112)
CO2 SERPL-SCNC: 23 MMOL/L (ref 20–33)
CO2 SERPL-SCNC: 23 MMOL/L (ref 20–33)
CREAT SERPL-MCNC: 0.54 MG/DL (ref 0.5–1.4)
CREAT SERPL-MCNC: 0.59 MG/DL (ref 0.5–1.4)
EOSINOPHIL # BLD AUTO: 0.32 K/UL (ref 0–0.51)
EOSINOPHIL NFR BLD: 2.9 % (ref 0–6.9)
ERYTHROCYTE [DISTWIDTH] IN BLOOD BY AUTOMATED COUNT: 49.6 FL (ref 35.9–50)
FERRITIN SERPL-MCNC: 34.1 NG/ML (ref 10–291)
GLOBULIN SER CALC-MCNC: 3.1 G/DL (ref 1.9–3.5)
GLUCOSE SERPL-MCNC: 104 MG/DL (ref 65–99)
GLUCOSE SERPL-MCNC: 105 MG/DL (ref 65–99)
HCT VFR BLD AUTO: 32.3 % (ref 37–47)
HGB BLD-MCNC: 10.5 G/DL (ref 12–16)
IMM GRANULOCYTES # BLD AUTO: 0.03 K/UL (ref 0–0.11)
IMM GRANULOCYTES NFR BLD AUTO: 0.3 % (ref 0–0.9)
IRON SATN MFR SERPL: 6 % (ref 15–55)
IRON SERPL-MCNC: 20 UG/DL (ref 40–170)
LYMPHOCYTES # BLD AUTO: 2.05 K/UL (ref 1–4.8)
LYMPHOCYTES NFR BLD: 18.3 % (ref 22–41)
MAGNESIUM SERPL-MCNC: 1.6 MG/DL (ref 1.5–2.5)
MAGNESIUM SERPL-MCNC: 1.6 MG/DL (ref 1.5–2.5)
MCH RBC QN AUTO: 26.3 PG (ref 27–33)
MCHC RBC AUTO-ENTMCNC: 32.5 G/DL (ref 33.6–35)
MCV RBC AUTO: 81 FL (ref 81.4–97.8)
MONOCYTES # BLD AUTO: 1.35 K/UL (ref 0–0.85)
MONOCYTES NFR BLD AUTO: 12 % (ref 0–13.4)
NEUTROPHILS # BLD AUTO: 7.43 K/UL (ref 2–7.15)
NEUTROPHILS NFR BLD: 66.1 % (ref 44–72)
NRBC # BLD AUTO: 0 K/UL
NRBC BLD-RTO: 0 /100 WBC
PHOSPHATE SERPL-MCNC: 4.1 MG/DL (ref 2.5–4.5)
PHOSPHATE SERPL-MCNC: 4.3 MG/DL (ref 2.5–4.5)
PLATELET # BLD AUTO: 218 K/UL (ref 164–446)
PMV BLD AUTO: 10 FL (ref 9–12.9)
POTASSIUM SERPL-SCNC: 3.5 MMOL/L (ref 3.6–5.5)
POTASSIUM SERPL-SCNC: 3.5 MMOL/L (ref 3.6–5.5)
PROCALCITONIN SERPL-MCNC: 0.05 NG/ML
PROT SERPL-MCNC: 6.4 G/DL (ref 6–8.2)
RBC # BLD AUTO: 3.99 M/UL (ref 4.2–5.4)
SODIUM SERPL-SCNC: 133 MMOL/L (ref 135–145)
SODIUM SERPL-SCNC: 134 MMOL/L (ref 135–145)
TIBC SERPL-MCNC: 321 UG/DL (ref 250–450)
WBC # BLD AUTO: 11.2 K/UL (ref 4.8–10.8)

## 2018-11-20 PROCEDURE — 85025 COMPLETE CBC W/AUTO DIFF WBC: CPT

## 2018-11-20 PROCEDURE — 700102 HCHG RX REV CODE 250 W/ 637 OVERRIDE(OP): Performed by: SPECIALIST

## 2018-11-20 PROCEDURE — 80048 BASIC METABOLIC PNL TOTAL CA: CPT

## 2018-11-20 PROCEDURE — 36415 COLL VENOUS BLD VENIPUNCTURE: CPT

## 2018-11-20 PROCEDURE — 700105 HCHG RX REV CODE 258: Performed by: INTERNAL MEDICINE

## 2018-11-20 PROCEDURE — 99232 SBSQ HOSP IP/OBS MODERATE 35: CPT | Performed by: SPECIALIST

## 2018-11-20 PROCEDURE — 80053 COMPREHEN METABOLIC PANEL: CPT

## 2018-11-20 PROCEDURE — 84100 ASSAY OF PHOSPHORUS: CPT

## 2018-11-20 PROCEDURE — 99254 IP/OBS CNSLTJ NEW/EST MOD 60: CPT | Performed by: PSYCHIATRY & NEUROLOGY

## 2018-11-20 PROCEDURE — 700105 HCHG RX REV CODE 258

## 2018-11-20 PROCEDURE — 83735 ASSAY OF MAGNESIUM: CPT

## 2018-11-20 PROCEDURE — 83540 ASSAY OF IRON: CPT

## 2018-11-20 PROCEDURE — A9270 NON-COVERED ITEM OR SERVICE: HCPCS | Performed by: SPECIALIST

## 2018-11-20 PROCEDURE — 700111 HCHG RX REV CODE 636 W/ 250 OVERRIDE (IP): Performed by: INTERNAL MEDICINE

## 2018-11-20 PROCEDURE — 82728 ASSAY OF FERRITIN: CPT

## 2018-11-20 PROCEDURE — 84145 PROCALCITONIN (PCT): CPT

## 2018-11-20 PROCEDURE — 83550 IRON BINDING TEST: CPT

## 2018-11-20 PROCEDURE — 770001 HCHG ROOM/CARE - MED/SURG/GYN PRIV*

## 2018-11-20 RX ORDER — POTASSIUM CHLORIDE 20 MEQ/1
20 TABLET, EXTENDED RELEASE ORAL DAILY
Status: DISCONTINUED | OUTPATIENT
Start: 2018-11-20 | End: 2018-11-21 | Stop reason: HOSPADM

## 2018-11-20 RX ADMIN — AMPICILLIN SODIUM AND SULBACTAM SODIUM 3 G: 2; 1 INJECTION, POWDER, FOR SOLUTION INTRAMUSCULAR; INTRAVENOUS at 00:53

## 2018-11-20 RX ADMIN — AMPICILLIN SODIUM AND SULBACTAM SODIUM 3 G: 2; 1 INJECTION, POWDER, FOR SOLUTION INTRAMUSCULAR; INTRAVENOUS at 05:20

## 2018-11-20 RX ADMIN — AMPICILLIN SODIUM AND SULBACTAM SODIUM 3 G: 2; 1 INJECTION, POWDER, FOR SOLUTION INTRAMUSCULAR; INTRAVENOUS at 17:34

## 2018-11-20 RX ADMIN — BUPRENORPHINE HYDROCHLORIDE AND NALOXONE HYDROCHLORIDE DIHYDRATE 1 TABLET: 8; 2 TABLET SUBLINGUAL at 05:25

## 2018-11-20 RX ADMIN — POTASSIUM CHLORIDE 20 MEQ: 1500 TABLET, EXTENDED RELEASE ORAL at 13:30

## 2018-11-20 RX ADMIN — AMPICILLIN SODIUM AND SULBACTAM SODIUM 3 G: 2; 1 INJECTION, POWDER, FOR SOLUTION INTRAMUSCULAR; INTRAVENOUS at 13:30

## 2018-11-20 RX ADMIN — AMPICILLIN SODIUM AND SULBACTAM SODIUM 3 G: 2; 1 INJECTION, POWDER, FOR SOLUTION INTRAMUSCULAR; INTRAVENOUS at 23:10

## 2018-11-20 RX ADMIN — SODIUM CHLORIDE 500 ML: 9 INJECTION, SOLUTION INTRAVENOUS at 00:56

## 2018-11-20 RX ADMIN — ENOXAPARIN SODIUM 40 MG: 100 INJECTION SUBCUTANEOUS at 05:25

## 2018-11-20 RX ADMIN — MAGNESIUM GLUCONATE 500 MG ORAL TABLET 400 MG: 500 TABLET ORAL at 09:08

## 2018-11-20 ASSESSMENT — ENCOUNTER SYMPTOMS
NERVOUS/ANXIOUS: 1
DEPRESSION: 1
SPUTUM PRODUCTION: 0
VOMITING: 0
ABDOMINAL PAIN: 0
NAUSEA: 0
FEVER: 0
SHORTNESS OF BREATH: 0
COUGH: 0
FOCAL WEAKNESS: 0
STRIDOR: 0
SORE THROAT: 1
SENSORY CHANGE: 0
CHILLS: 0
DIZZINESS: 0
MYALGIAS: 0
HEADACHES: 0
DIARRHEA: 0
PALPITATIONS: 0

## 2018-11-20 ASSESSMENT — PATIENT HEALTH QUESTIONNAIRE - PHQ9
5. POOR APPETITE OR OVEREATING: NOT AT ALL
9. THOUGHTS THAT YOU WOULD BE BETTER OFF DEAD, OR OF HURTING YOURSELF: NOT AT ALL
6. FEELING BAD ABOUT YOURSELF - OR THAT YOU ARE A FAILURE OR HAVE LET YOURSELF OR YOUR FAMILY DOWN: SEVERAL DAYS
SUM OF ALL RESPONSES TO PHQ QUESTIONS 1-9: 7
2. FEELING DOWN, DEPRESSED, IRRITABLE, OR HOPELESS: MORE THAN HALF THE DAYS
SUM OF ALL RESPONSES TO PHQ9 QUESTIONS 1 AND 2: 4
8. MOVING OR SPEAKING SO SLOWLY THAT OTHER PEOPLE COULD HAVE NOTICED. OR THE OPPOSITE, BEING SO FIGETY OR RESTLESS THAT YOU HAVE BEEN MOVING AROUND A LOT MORE THAN USUAL: NOT AT ALL
3. TROUBLE FALLING OR STAYING ASLEEP OR SLEEPING TOO MUCH: NOT AT ALL
1. LITTLE INTEREST OR PLEASURE IN DOING THINGS: MORE THAN HALF THE DAYS
7. TROUBLE CONCENTRATING ON THINGS, SUCH AS READING THE NEWSPAPER OR WATCHING TELEVISION: MORE THAN HALF THE DAYS
4. FEELING TIRED OR HAVING LITTLE ENERGY: NOT AT ALL

## 2018-11-20 ASSESSMENT — PAIN SCALES - GENERAL
PAINLEVEL_OUTOF10: 1
PAINLEVEL_OUTOF10: 0

## 2018-11-20 NOTE — PSYCHIATRY
"PSYCHIATRIC CONSULTATION:  Reason for admission:   Overdose   Reason for consult: suicide attempt   Requesting Physician:Daryc    Legal status:  On hold     Chief Complaint: \"I don't know what happened\"    HPI:   Pt is a 28 y/o woman with a history of substance abuse, depression, and mood lability. She overdosed on a large amount of seroquel and required intubation. This occurred after an argument with family members. Was extubated yesterday. Was angry, wanting mother to visit. Was agitated. She was noted to have linear scars on B arms and legs. She at first repeatedly stated she didn't know what happened. resistent to interview, wants to leave. She states later she and her mom \"have a drinking problem together\". They lied to her mom's wife and said they were going to dinner, and instead went to bars together. They got very drunk and started arguing and \"almost got into a fist fight\". Pt states \"whenever I get into an argument like that I overdose. It's just how I cope\". She states \"therapy doesn't work for me\" so she states she hasn't done it since she was a teen. She states \"DBT doesn't work for me\" because she's had it inpatient and states it didn't stop her from wanting to overdose again.she does this about every six months. She has no plan on how to stop doing it. She states \"i'll just stop it, I guess\". She states \"I don't know, therapy's not for me. I'm surprised I'm still alive. There's no reason for me to be alive right now.\"  She is requesting seroquel back. She has been on it for years, and it obviously is not keeping her from overdosing.     Psychiatric Review of Systems:current symptoms as reported by pt.  Depression:      Appears depressed. Denies anhedonia. Denies poor energy. Doesn't care if she lives or dies.   Gaby:grandiose. Impulsive.   Anxiety/Panic Attacks denies   PTSD symptom: not assessed  Psychosis:no a/vh. No overt paranoia or delusions   Other:       Medical Review of Systems: as " "reported by pt. All systems reviewed. Only those found to be + are noted below. All others are negative.   Neurological:    TBIs:denies    SZs:denies    Strokes:denies    Other:  Other medical symptoms:   Thyroid:denies    Diabetes:denies    Cardiovascular disease:denies   Feels tired.   No sob  No cp  No cough  No n/v  No diarrhea   All other systems reviewed and are negative.     Psychiatric Examination: observed phenomenon:  Vitals:   Vitals:    11/19/18 2025 11/20/18 0145 11/20/18 0425 11/20/18 0726   BP: 122/69 104/57 103/56 (!) 96/54   Pulse: (!) 102 76 92 85   Resp: 16 16 16 18   Temp: (!) 38.1 °C (100.6 °F) 37.4 °C (99.3 °F) 36.8 °C (98.3 °F) 36.6 °C (97.8 °F)   TempSrc: Temporal Temporal Temporal Temporal   SpO2: 91% 97% 93% 95%   Weight: 67.9 kg (149 lb 11.1 oz)      Height:           Musculoskeletal: psychomotor retardation   Appearance:grooming fair   Thought Process:Logical and sequential, goal-directed   Thought Content: No a/vh, no evidence of delusions, no ideas of reference, no internal stimulation noted   Speech:very flippant.   Mood:          \"okay\"  Affect:         Blunted, not congruent   SI/HI:   Reports not caring if she lives or dies   Attention/Alertness:   Awake, alert.   Memory:    Grossly intact.   Orientation:       Grossly intact.   Cognition:  Grossly intact.   Insight/Judgement into symptoms:  Very poor   Neurological Testing:          Past Psychiatric Hx:   Overdosed 6 months ago in [place], staying with her father.   Overdosed in April of 2017.   Meth and benzo od in May 2017.   History of schizoaffective disorder. Has been institutionalized many times.   Hx of self mutilation   Hx of multiple suicide attempts.   Possible hx reactive attachment dx and ptsd.   Has been on seroquel, valium, lithium in the past. Haldol caused \"drooling, slurring\"   Family Psychiatric Hx:  Father has bipolar disorder     Social Hx:  Lives in [place].   Lives in [place] in trailer with her mother and her " "mother's wife.   Pt is on social security. Mother has been a payee in the past.   She told SW they sometimes get along and other times \"when they are drinking, they get into it and start yelling and get into bad fights\".   Stopped school at 6h grade.   She has a bf who is going to retirement on 11/30 for a domestic violence felony, not against her.   Drug/Alcohol/Tobacco Hx:   Drugs: amphetamine abuse , hx heroin abuse, has bee on Suboxone.    Alcohol: +   Tobacco: +     Medical Hx: labs, MARS, medications, etc were reviewed. Only those findings of potential interest to psychiatry are noted below:  Past Medical History:   Diagnosis Date   • Psychiatric disorder     RAD, schizo, bipolar.        Allergies: Lorazepam [ativan] and Sulfa drugs    Medications:  Labs:   Recent Results (from the past 48 hour(s))   Magnesium    Collection Time: 11/19/18  4:15 AM   Result Value Ref Range    Magnesium 1.6 1.5 - 2.5 mg/dL   Phosphorus    Collection Time: 11/19/18  4:15 AM   Result Value Ref Range    Phosphorus 2.8 2.5 - 4.5 mg/dL   CBC with Differential    Collection Time: 11/19/18  4:15 AM   Result Value Ref Range    WBC 8.2 4.8 - 10.8 K/uL    RBC 3.91 (L) 4.20 - 5.40 M/uL    Hemoglobin 10.2 (L) 12.0 - 16.0 g/dL    Hematocrit 31.7 (L) 37.0 - 47.0 %    MCV 81.1 (L) 81.4 - 97.8 fL    MCH 26.1 (L) 27.0 - 33.0 pg    MCHC 32.2 (L) 33.6 - 35.0 g/dL    RDW 49.8 35.9 - 50.0 fL    Platelet Count 191 164 - 446 K/uL    MPV 9.6 9.0 - 12.9 fL    Neutrophils-Polys 64.80 44.00 - 72.00 %    Lymphocytes 21.80 (L) 22.00 - 41.00 %    Monocytes 12.30 0.00 - 13.40 %    Eosinophils 0.50 0.00 - 6.90 %    Basophils 0.40 0.00 - 1.80 %    Immature Granulocytes 0.20 0.00 - 0.90 %    Nucleated RBC 0.00 /100 WBC    Neutrophils (Absolute) 5.30 2.00 - 7.15 K/uL    Lymphs (Absolute) 1.78 1.00 - 4.80 K/uL    Monos (Absolute) 1.01 (H) 0.00 - 0.85 K/uL    Eos (Absolute) 0.04 0.00 - 0.51 K/uL    Baso (Absolute) 0.03 0.00 - 0.12 K/uL    Immature Granulocytes (abs) " 0.02 0.00 - 0.11 K/uL    NRBC (Absolute) 0.00 K/uL   COMP METABOLIC PANEL    Collection Time: 11/19/18  4:15 AM   Result Value Ref Range    Sodium 141 135 - 145 mmol/L    Potassium 3.3 (L) 3.6 - 5.5 mmol/L    Chloride 110 96 - 112 mmol/L    Co2 23 20 - 33 mmol/L    Anion Gap 8.0 0.0 - 11.9    Glucose 108 (H) 65 - 99 mg/dL    Bun 4 (L) 8 - 22 mg/dL    Creatinine 0.58 0.50 - 1.40 mg/dL    Calcium 8.4 (L) 8.5 - 10.5 mg/dL    AST(SGOT) 12 12 - 45 U/L    ALT(SGPT) <5 2 - 50 U/L    Alkaline Phosphatase 69 30 - 99 U/L    Total Bilirubin 0.8 0.1 - 1.5 mg/dL    Albumin 3.2 3.2 - 4.9 g/dL    Total Protein 5.9 (L) 6.0 - 8.2 g/dL    Globulin 2.7 1.9 - 3.5 g/dL    A-G Ratio 1.2 g/dL   ESTIMATED GFR    Collection Time: 11/19/18  4:15 AM   Result Value Ref Range    GFR If African American >60 >60 mL/min/1.73 m 2    GFR If Non African American >60 >60 mL/min/1.73 m 2   FERRITIN    Collection Time: 11/20/18  5:23 AM   Result Value Ref Range    Ferritin 34.1 10.0 - 291.0 ng/mL   CBC WITH DIFFERENTIAL    Collection Time: 11/20/18  5:23 AM   Result Value Ref Range    WBC 11.2 (H) 4.8 - 10.8 K/uL    RBC 3.99 (L) 4.20 - 5.40 M/uL    Hemoglobin 10.5 (L) 12.0 - 16.0 g/dL    Hematocrit 32.3 (L) 37.0 - 47.0 %    MCV 81.0 (L) 81.4 - 97.8 fL    MCH 26.3 (L) 27.0 - 33.0 pg    MCHC 32.5 (L) 33.6 - 35.0 g/dL    RDW 49.6 35.9 - 50.0 fL    Platelet Count 218 164 - 446 K/uL    MPV 10.0 9.0 - 12.9 fL    Neutrophils-Polys 66.10 44.00 - 72.00 %    Lymphocytes 18.30 (L) 22.00 - 41.00 %    Monocytes 12.00 0.00 - 13.40 %    Eosinophils 2.90 0.00 - 6.90 %    Basophils 0.40 0.00 - 1.80 %    Immature Granulocytes 0.30 0.00 - 0.90 %    Nucleated RBC 0.00 /100 WBC    Neutrophils (Absolute) 7.43 (H) 2.00 - 7.15 K/uL    Lymphs (Absolute) 2.05 1.00 - 4.80 K/uL    Monos (Absolute) 1.35 (H) 0.00 - 0.85 K/uL    Eos (Absolute) 0.32 0.00 - 0.51 K/uL    Baso (Absolute) 0.04 0.00 - 0.12 K/uL    Immature Granulocytes (abs) 0.03 0.00 - 0.11 K/uL    NRBC (Absolute) 0.00  K/uL   COMP METABOLIC PANEL    Collection Time: 11/20/18  5:23 AM   Result Value Ref Range    Sodium 134 (L) 135 - 145 mmol/L    Potassium 3.5 (L) 3.6 - 5.5 mmol/L    Chloride 103 96 - 112 mmol/L    Co2 23 20 - 33 mmol/L    Anion Gap 8.0 0.0 - 11.9    Glucose 105 (H) 65 - 99 mg/dL    Bun 12 8 - 22 mg/dL    Creatinine 0.54 0.50 - 1.40 mg/dL    Calcium 9.2 8.5 - 10.5 mg/dL    AST(SGOT) 10 (L) 12 - 45 U/L    ALT(SGPT) 7 2 - 50 U/L    Alkaline Phosphatase 76 30 - 99 U/L    Total Bilirubin 0.7 0.1 - 1.5 mg/dL    Albumin 3.3 3.2 - 4.9 g/dL    Total Protein 6.4 6.0 - 8.2 g/dL    Globulin 3.1 1.9 - 3.5 g/dL    A-G Ratio 1.1 g/dL   MAGNESIUM    Collection Time: 11/20/18  5:23 AM   Result Value Ref Range    Magnesium 1.6 1.5 - 2.5 mg/dL   PHOSPHORUS    Collection Time: 11/20/18  5:23 AM   Result Value Ref Range    Phosphorus 4.1 2.5 - 4.5 mg/dL   PROCALCITONIN    Collection Time: 11/20/18  5:23 AM   Result Value Ref Range    Procalcitonin 0.05 <0.25 ng/mL   Magnesium    Collection Time: 11/20/18  5:23 AM   Result Value Ref Range    Magnesium 1.6 1.5 - 2.5 mg/dL   Phosphorus    Collection Time: 11/20/18  5:23 AM   Result Value Ref Range    Phosphorus 4.3 2.5 - 4.5 mg/dL   Basic Metabolic Panel (BMP)    Collection Time: 11/20/18  5:23 AM   Result Value Ref Range    Sodium 133 (L) 135 - 145 mmol/L    Potassium 3.5 (L) 3.6 - 5.5 mmol/L    Chloride 104 96 - 112 mmol/L    Co2 23 20 - 33 mmol/L    Glucose 104 (H) 65 - 99 mg/dL    Bun 12 8 - 22 mg/dL    Creatinine 0.59 0.50 - 1.40 mg/dL    Calcium 8.9 8.5 - 10.5 mg/dL    Anion Gap 6.0 0.0 - 11.9   IRON/TOTAL IRON BIND    Collection Time: 11/20/18  5:23 AM   Result Value Ref Range    Iron 20 (L) 40 - 170 ug/dL    Total Iron Binding 321 250 - 450 ug/dL    % Saturation 6 (L) 15 - 55 %   ESTIMATED GFR    Collection Time: 11/20/18  5:23 AM   Result Value Ref Range    GFR If African American >60 >60 mL/min/1.73 m 2    GFR If Non African American >60 >60 mL/min/1.73 m 2   ESTIMATED GFR     Collection Time: 11/20/18  5:23 AM   Result Value Ref Range    GFR If African American >60 >60 mL/min/1.73 m 2    GFR If Non African American >60 >60 mL/min/1.73 m 2     DX-CHEST-PORTABLE (1 VIEW)   Final Result      1.  Multiple right-sided rib fractures.   2.  There is no pneumothorax.      DX-CHEST-PORTABLE (1 VIEW)   Final Result      No acute cardiopulmonary abnormality.      OUTSIDE IMAGES-DX CHEST   Final Result          ASSESSMENT:   Bipolar disorder   Alcohol use disorder  Methamphetamine us disorder  PTSD   R/o borderline personality disorder     PLAN:  Legal status: extended.     Will not restart seroquel at this time.     After 1-2 more days of washout, will restart on different mood stabilizer. She has failed valium, lithium in the past.   Transfer to inpatient psych when bed available.     Will follow.   Thank you for the consult.

## 2018-11-20 NOTE — PROGRESS NOTES
Hospital Medicine Daily Progress Note    Date of Service  11/19/2018    Chief Complaint  27 y.o. female admitted 11/18/2018 with history of multiple suicide attempts, history of IV drug abuse, amphetamine abuse, PTSD, schizoaffective disorder admitted after a reported suicide attempt with approximately 70 pills of Seroquel and alcohol.    Hospital Course    11/19 the patient successfully extubated placed on legal hold, is somnolent but overall improved      Interval Problem Update  Patient seen and examined today. ICU Care  Care and plan discussed in IDT/Hot rounds.  Lines and assistive devices reviewed.    Patient tolerating treatment and therapies.  All Data, Medication data reviewed.  Case discussed with nursing as available.  Plan of Care reviewed with patient and notified of changes.  11/19 patient extubated, in sinus rhythm, hemodynamically stable, denies abdominal pain nausea vomiting, was started on an empiric antibiotics for possible aspiration event  Consultants/Specialty  Critical care pulmonary  Psychiatry    Code Status  Full code    Disposition  To be determined, on legal hold    Review of Systems  Review of Systems   Constitutional: Positive for malaise/fatigue. Negative for chills and fever.   HENT: Negative.    Eyes: Negative.    Respiratory: Positive for cough.    Cardiovascular: Positive for chest pain. Negative for palpitations.   Gastrointestinal: Negative.  Negative for heartburn, nausea and vomiting.   Genitourinary: Negative.  Negative for dysuria and frequency.   Musculoskeletal: Negative.  Negative for back pain and neck pain.   Skin: Negative.  Negative for itching and rash.   Neurological: Positive for weakness. Negative for dizziness, focal weakness and headaches.   Endo/Heme/Allergies: Negative.  Negative for polydipsia. Does not bruise/bleed easily.   Psychiatric/Behavioral: Positive for depression. The patient is nervous/anxious.         Physical Exam  Temp:  [37.1 °C (98.7 °F)-38.1  °C (100.6 °F)] 38.1 °C (100.6 °F)  Pulse:  [] 102  Resp:  [13-79] 16  BP: (112-122)/(68-69) 122/69    Physical Exam   Constitutional: She is oriented to person, place, and time. She appears well-developed and well-nourished.   HENT:   Head: Normocephalic and atraumatic.   Nose: Nose normal.   Mouth/Throat: Oropharynx is clear and moist.   Eyes: Pupils are equal, round, and reactive to light. Conjunctivae and EOM are normal.   Neck: Normal range of motion. Neck supple. No JVD present. No thyromegaly present.   Cardiovascular: Normal rate, regular rhythm and normal heart sounds.  Exam reveals no gallop and no friction rub.    Pulses:       Dorsalis pedis pulses are 2+ on the right side, and 2+ on the left side.   Capillary refill <3 secs   Pulmonary/Chest: Effort normal and breath sounds normal. She has no wheezes. She has no rales.   Abdominal: Soft. Bowel sounds are normal. She exhibits no distension and no mass. There is no tenderness. There is no rebound and no guarding.   Musculoskeletal: Normal range of motion. She exhibits no edema or tenderness.   Lymphadenopathy:     She has no cervical adenopathy.   Neurological: She is alert and oriented to person, place, and time. No cranial nerve deficit.   Skin: Skin is warm and dry. She is not diaphoretic. No cyanosis.   Psychiatric: Her affect is blunt. Her speech is delayed. She is slowed.   Nursing note and vitals reviewed.      Fluids    Intake/Output Summary (Last 24 hours) at 11/19/18 2225  Last data filed at 11/19/18 2100   Gross per 24 hour   Intake          2090.41 ml   Output             1900 ml   Net           190.41 ml       Laboratory  Recent Labs      11/18/18   0505  11/19/18   0415   WBC  7.2  8.2   RBC  3.44*  3.91*   HEMOGLOBIN  8.9*  10.2*   HEMATOCRIT  28.1*  31.7*   MCV  81.7  81.1*   MCH  25.9*  26.1*   MCHC  31.7*  32.2*   RDW  49.1  49.8   PLATELETCT  157*  191   MPV  10.3  9.6     Recent Labs      11/18/18   0505  11/19/18   0415   SODIUM   145  141   POTASSIUM  3.7  3.3*   CHLORIDE  119*  110   CO2  17*  23   GLUCOSE  108*  108*   BUN  10  4*   CREATININE  0.41*  0.58   CALCIUM  7.7*  8.4*             Recent Labs      11/18/18   0205   TRIGLYCERIDE  47       Imaging  DX-CHEST-PORTABLE (1 VIEW)   Final Result      1.  Multiple right-sided rib fractures.   2.  There is no pneumothorax.      DX-CHEST-PORTABLE (1 VIEW)   Final Result      No acute cardiopulmonary abnormality.      OUTSIDE IMAGES-DX CHEST   Final Result           Assessment/Plan  * Acute hypoxemic respiratory failure (HCC)- (present on admission)   Assessment & Plan    S/p Intubation and mechanical ventilation  Status post successful extubation     Drug overdose, intentional self-harm, initial encounter ([facility])- (present on admission)   Assessment & Plan    Allegedly massive ingestion of Seroquel  Is extubated    Psych consultation has been placed     Toxic encephalopathy- (present on admission)   Assessment & Plan    Secondary to seroquel overdose  Supportive care  IV fluid hydration     Anemia   Assessment & Plan    We will further evaluate, question of chronicity     Rib fractures   Assessment & Plan    Patient reports old traumatic injury     Hypokalemia- (present on admission)   Assessment & Plan    Replace with IV KCl  Check mag  Monitor BMP     Fever- (present on admission)   Assessment & Plan    Likely reactive     Schizoaffective disorder (HCC)- (present on admission)   Assessment & Plan    Psych has been consulted     Plan  Psychiatry consult  We will hold  Monitor closely for additional effects of overdose  A.m. Labs  Okay for transfer to telemetry  Anemia workup  See orders  Critically ill but stable for downgrade    VTE prophylaxis: Lovenox

## 2018-11-20 NOTE — PSYCHIATRY
"BRIEF PSYCHIATRIC CONSULT NOTE: patient seen, full note to follow.  -Legal Hold:extended  -Sitter:yes  -Restrictions:   -phone:no   -visitors:no   -personal items: no     -Plan:continue to follow      Will not restart seroquel at this time.   High risk. Last od 6 months ago, about one year ago before that.   She states she and her mom \"set each other off\" and her mom is currently mad at her. Will restrict visits at this time.     Thank you for the consult.       "

## 2018-11-20 NOTE — PROGRESS NOTES
Assumed care at 1720. Pt A&O x 4. Pt sinus tachycardia (101) on monitor. First assessment completed, call light within reach, bed locked and in low position. Sitter in room. All questions asked. Will continue to monitor.

## 2018-11-20 NOTE — PROGRESS NOTES
Patient sitter at bedside 1:1, legal hold. Phone removed from room. Will have Psychiatry re-evaluate in the morning, patient frustrated Psychiatry wasn't at bedside today. Will continue to monitor.

## 2018-11-20 NOTE — PROGRESS NOTES
Received report from REHANA Dietrich.  Assumed care of pt.   Pt is alert and oriented x4.  VSS.  No signs of distress.  Resting in room.  Sitter at bedside for legal hold.  Reviewed plan of care with pt.  Call light within reach, bed in low position, will continue to monitor.

## 2018-11-20 NOTE — PROGRESS NOTES
2 RN skin check done with Dana RN  Skin unremarkable with the exception of linear scars on bilateral legs and arms.

## 2018-11-20 NOTE — CARE PLAN
"Problem: Communication  Goal: The ability to communicate needs accurately and effectively will improve  Outcome: PROGRESSING AS EXPECTED  Patient able to communicate effectively.     Problem: Safety  Goal: Will remain free from injury  Outcome: PROGRESSING AS EXPECTED  Patient states, \"I feel safe right now, I wasn't trying to hurt myself.\".   Also 1:1 sitter at bedside.    Problem: Infection  Goal: Will remain free from infection  Outcome: PROGRESSING AS EXPECTED  No s/s of infection, thus far. Will continue to assess.       "

## 2018-11-20 NOTE — PROGRESS NOTES
Assumed care at 1900. Bedside report received from REHANA Mercedes. Patient's chart and MAR reviewed. Pt denies pain at this time. Pt is A & O x4. Patient was updated on plan of care for the day. Questions answered and concerns addressed.  Pt denies any additional needs at this time. White board updated. Call light, phone and personal belongings within reach.

## 2018-11-21 VITALS
DIASTOLIC BLOOD PRESSURE: 62 MMHG | OXYGEN SATURATION: 98 % | TEMPERATURE: 97.3 F | HEIGHT: 65 IN | WEIGHT: 149.91 LBS | BODY MASS INDEX: 24.98 KG/M2 | HEART RATE: 79 BPM | SYSTOLIC BLOOD PRESSURE: 99 MMHG | RESPIRATION RATE: 16 BRPM

## 2018-11-21 PROBLEM — E87.6 HYPOKALEMIA: Status: RESOLVED | Noted: 2018-11-18 | Resolved: 2018-11-21

## 2018-11-21 PROBLEM — G92.9 TOXIC ENCEPHALOPATHY: Status: RESOLVED | Noted: 2018-11-18 | Resolved: 2018-11-21

## 2018-11-21 PROBLEM — R50.9 FEVER: Status: RESOLVED | Noted: 2017-05-25 | Resolved: 2018-11-21

## 2018-11-21 LAB
ANION GAP SERPL CALC-SCNC: 8 MMOL/L (ref 0–11.9)
BASOPHILS # BLD AUTO: 0.5 % (ref 0–1.8)
BASOPHILS # BLD: 0.03 K/UL (ref 0–0.12)
BUN SERPL-MCNC: 9 MG/DL (ref 8–22)
CALCIUM SERPL-MCNC: 9.3 MG/DL (ref 8.5–10.5)
CHLORIDE SERPL-SCNC: 104 MMOL/L (ref 96–112)
CO2 SERPL-SCNC: 26 MMOL/L (ref 20–33)
CREAT SERPL-MCNC: 0.55 MG/DL (ref 0.5–1.4)
EOSINOPHIL # BLD AUTO: 0.34 K/UL (ref 0–0.51)
EOSINOPHIL NFR BLD: 5.2 % (ref 0–6.9)
ERYTHROCYTE [DISTWIDTH] IN BLOOD BY AUTOMATED COUNT: 49 FL (ref 35.9–50)
GLUCOSE SERPL-MCNC: 84 MG/DL (ref 65–99)
HCT VFR BLD AUTO: 34.1 % (ref 37–47)
HGB BLD-MCNC: 11 G/DL (ref 12–16)
IMM GRANULOCYTES # BLD AUTO: 0.02 K/UL (ref 0–0.11)
IMM GRANULOCYTES NFR BLD AUTO: 0.3 % (ref 0–0.9)
LYMPHOCYTES # BLD AUTO: 2.04 K/UL (ref 1–4.8)
LYMPHOCYTES NFR BLD: 31.2 % (ref 22–41)
MAGNESIUM SERPL-MCNC: 1.8 MG/DL (ref 1.5–2.5)
MCH RBC QN AUTO: 26.1 PG (ref 27–33)
MCHC RBC AUTO-ENTMCNC: 32.3 G/DL (ref 33.6–35)
MCV RBC AUTO: 80.8 FL (ref 81.4–97.8)
MONOCYTES # BLD AUTO: 0.89 K/UL (ref 0–0.85)
MONOCYTES NFR BLD AUTO: 13.6 % (ref 0–13.4)
NEUTROPHILS # BLD AUTO: 3.22 K/UL (ref 2–7.15)
NEUTROPHILS NFR BLD: 49.2 % (ref 44–72)
NRBC # BLD AUTO: 0 K/UL
NRBC BLD-RTO: 0 /100 WBC
PHOSPHATE SERPL-MCNC: 4.7 MG/DL (ref 2.5–4.5)
PLATELET # BLD AUTO: 245 K/UL (ref 164–446)
PMV BLD AUTO: 9.6 FL (ref 9–12.9)
POTASSIUM SERPL-SCNC: 3.7 MMOL/L (ref 3.6–5.5)
RBC # BLD AUTO: 4.22 M/UL (ref 4.2–5.4)
SODIUM SERPL-SCNC: 138 MMOL/L (ref 135–145)
WBC # BLD AUTO: 6.5 K/UL (ref 4.8–10.8)

## 2018-11-21 PROCEDURE — 700111 HCHG RX REV CODE 636 W/ 250 OVERRIDE (IP): Performed by: INTERNAL MEDICINE

## 2018-11-21 PROCEDURE — 700105 HCHG RX REV CODE 258: Performed by: INTERNAL MEDICINE

## 2018-11-21 PROCEDURE — 85025 COMPLETE CBC W/AUTO DIFF WBC: CPT

## 2018-11-21 PROCEDURE — 84100 ASSAY OF PHOSPHORUS: CPT

## 2018-11-21 PROCEDURE — 36415 COLL VENOUS BLD VENIPUNCTURE: CPT

## 2018-11-21 PROCEDURE — 99238 HOSP IP/OBS DSCHRG MGMT 30/<: CPT | Performed by: SPECIALIST

## 2018-11-21 PROCEDURE — 83735 ASSAY OF MAGNESIUM: CPT

## 2018-11-21 PROCEDURE — 700111 HCHG RX REV CODE 636 W/ 250 OVERRIDE (IP): Performed by: SPECIALIST

## 2018-11-21 PROCEDURE — 80048 BASIC METABOLIC PNL TOTAL CA: CPT

## 2018-11-21 PROCEDURE — 700111 HCHG RX REV CODE 636 W/ 250 OVERRIDE (IP): Performed by: PSYCHIATRY & NEUROLOGY

## 2018-11-21 RX ORDER — HALOPERIDOL 5 MG/ML
2.5 INJECTION INTRAMUSCULAR ONCE
Status: COMPLETED | OUTPATIENT
Start: 2018-11-21 | End: 2018-11-21

## 2018-11-21 RX ORDER — HALOPERIDOL 5 MG/ML
2.5 INJECTION INTRAMUSCULAR EVERY 6 HOURS PRN
Status: DISCONTINUED | OUTPATIENT
Start: 2018-11-21 | End: 2018-11-21 | Stop reason: HOSPADM

## 2018-11-21 RX ADMIN — HALOPERIDOL LACTATE 2.5 MG: 5 INJECTION, SOLUTION INTRAMUSCULAR at 12:00

## 2018-11-21 RX ADMIN — HALOPERIDOL LACTATE 2.5 MG: 5 INJECTION, SOLUTION INTRAMUSCULAR at 11:51

## 2018-11-21 RX ADMIN — AMPICILLIN SODIUM AND SULBACTAM SODIUM 3 G: 2; 1 INJECTION, POWDER, FOR SOLUTION INTRAMUSCULAR; INTRAVENOUS at 06:12

## 2018-11-21 ASSESSMENT — PAIN SCALES - GENERAL
PAINLEVEL_OUTOF10: 0
PAINLEVEL_OUTOF10: 0

## 2018-11-21 NOTE — DISCHARGE INSTRUCTIONS
Discharge Instructions    Discharged to home by medical transportation with escort. Discharged via ambulance, hospital escort: Yes.  Special equipment needed: Not Applicable    Be sure to schedule a follow-up appointment with your primary care doctor or any specialists as instructed.     Discharge Plan:   Smoking Cessation Offered: Patient Counseled  Pneumococcal Vaccine Administered/Refused: Not given - Patient refused pneumococcal vaccine  Influenza Vaccine Indication: Patient Refuses    I understand that a diet low in cholesterol, fat, and sodium is recommended for good health. Unless I have been given specific instructions below for another diet, I accept this instruction as my diet prescription.   Other diet: Regular    Special Instructions: None    · Is patient discharged on Warfarin / Coumadin?   No     Depression / Suicide Risk    As you are discharged from this [facility] facility, it is important to learn how to keep safe from harming yourself.    Recognize the warning signs:  · Abrupt changes in personality, positive or negative- including increase in energy   · Giving away possessions  · Change in eating patterns- significant weight changes-  positive or negative  · Change in sleeping patterns- unable to sleep or sleeping all the time   · Unwillingness or inability to communicate  · Depression  · Unusual sadness, discouragement and loneliness  · Talk of wanting to die  · Neglect of personal appearance   · Rebelliousness- reckless behavior  · Withdrawal from people/activities they love  · Confusion- inability to concentrate     If you or a loved one observes any of these behaviors or has concerns about self-harm, here's what you can do:  · Talk about it- your feelings and reasons for harming yourself  · Remove any means that you might use to hurt yourself (examples: pills, rope, extension cords, firearm)  · Get professional help from the community (Mental Health, Substance Abuse, psychological  counseling)  · Do not be alone:Call your Safe Contact- someone whom you trust who will be there for you.  · Call your local CRISIS HOTLINE 630-4028 or 074-675-5391  · Call your local Children's Mobile Crisis Response Team [place] (395) 926-3642 or www.[employer]  · Call the toll free National Suicide Prevention Hotlines   · National Suicide Prevention Lifeline 522-038-DQFL (5847)  · National Hope Line Network 800-SUICIDE (706-0072)

## 2018-11-21 NOTE — DISCHARGE PLANNING
Anticipated Discharge Disposition: D/C to Psych Facility    Action: MD completed Medical Clearance on pt's legal hold. LSW sent hold to [facility] with request to send psych referrals to local facilities, MCAT referral was sent to [facility], and hold was forwarded to legal hold [facility] for pt's chart.     Barriers to Discharge: Psych facility acceptance.    Plan: LSW to arrange transport to psych facility once pt is accepted.

## 2018-11-21 NOTE — PROGRESS NOTES
Discharge orders received.  Pt is alert and oriented x4.  IV removed.  Pt showered with CNA present.  DEONTE at bedside to transport pt to [facility].  Mom called to check on pt, notified of pt transfer to facility.

## 2018-11-21 NOTE — PROGRESS NOTES
Monitor summary:    SR  87-97  0.16/0.08/0.32    Changed to medical status, removed telemetry box, notified monitor room

## 2018-11-21 NOTE — CARE PLAN
Problem: Safety  Goal: Will remain free from injury  Outcome: PROGRESSING AS EXPECTED  Patient remain free from injury    Problem: Infection  Goal: Will remain free from infection  Outcome: PROGRESSING AS EXPECTED  On empirical antibiotics for possible aspiration pneumonia.

## 2018-11-21 NOTE — DISCHARGE PLANNING
Anticipated Discharge Disposition: D/C to Psych Facility    Action: LSW informed that [facility] will take pt, accepting MD is Dr. Menchaca. Per admissions, pt can go at any time. LSW completed PCS and transport form and requested 1430 transport time.    Barriers to Discharge: None.    Plan: LSW to complete transfer packet once d/c summary is entered.

## 2018-11-21 NOTE — PROGRESS NOTES
Pt seen by psych.  Legal hold extended.  Will restrict visitors and phone calls per order.  Sitter at bedside.  Will continue to monitor.

## 2018-11-21 NOTE — PROGRESS NOTES
Hospital Medicine Daily Progress Note    Date of Service  11/20/2018    Chief Complaint  27 y.o. female admitted 11/18/2018 with history of multiple suicide attempts, history of IV drug abuse, amphetamine abuse, PTSD, schizoaffective disorder admitted after a reported suicide attempt with approximately 70 pills of Seroquel and alcohol.    Hospital Course    11/19 the patient successfully extubated placed on legal hold, is somnolent but overall improved    11/20: Medically stable; psychiatry has extended patient's legal hold and does not plan on resuming Seroquel    Interval Problem Update  11/19 patient extubated, in sinus rhythm, hemodynamically stable, denies abdominal pain nausea vomiting, was started on an empiric antibiotics for possible aspiration event  11/20: No new acute complaints  Consultants/Specialty  Critical care pulmonary  Psychiatry    Code Status  Full code    Disposition  To be determined, on legal hold    Review of Systems  Review of Systems   Constitutional: Negative for chills, fever and malaise/fatigue.   Respiratory: Negative for cough and shortness of breath.    Cardiovascular: Negative for chest pain and palpitations.   Gastrointestinal: Negative for diarrhea, nausea and vomiting.   Genitourinary: Negative for dysuria.   Neurological: Negative for focal weakness and headaches.   Psychiatric/Behavioral: Positive for depression. The patient is nervous/anxious.         Physical Exam  Temp:  [36.3 °C (97.3 °F)-38.1 °C (100.6 °F)] 36.6 °C (97.9 °F)  Pulse:  [] 101  Resp:  [16-18] 18  BP: ()/(54-69) 119/63    Physical Exam   Constitutional: She is oriented to person, place, and time. She appears well-developed and well-nourished.   HENT:   Head: Normocephalic and atraumatic.   Nose: Nose normal.   Mouth/Throat: Oropharynx is clear and moist. No oropharyngeal exudate.   Eyes: Pupils are equal, round, and reactive to light. Conjunctivae and EOM are normal. No scleral icterus.   Neck:  Normal range of motion. Neck supple. No thyromegaly present.   Cardiovascular: Normal rate, regular rhythm and normal heart sounds.    Pulses:       Dorsalis pedis pulses are 2+ on the right side, and 2+ on the left side.   Capillary refill <3 secs   Pulmonary/Chest: Effort normal and breath sounds normal. No respiratory distress.   Abdominal: Soft. Bowel sounds are normal. She exhibits no distension. There is no tenderness.   Musculoskeletal: Normal range of motion. She exhibits no edema or tenderness.   Lymphadenopathy:     She has no cervical adenopathy.   Neurological: She is alert and oriented to person, place, and time. No cranial nerve deficit.   Skin: Skin is warm and dry. She is not diaphoretic. No cyanosis.   Psychiatric: Her affect is blunt. Her speech is delayed. She is slowed.   Nursing note and vitals reviewed.      Fluids    Intake/Output Summary (Last 24 hours) at 11/20/18 1855  Last data filed at 11/20/18 0800   Gross per 24 hour   Intake             1310 ml   Output                0 ml   Net             1310 ml       Laboratory  Recent Labs      11/18/18   0505  11/19/18   0415  11/20/18   0523   WBC  7.2  8.2  11.2*   RBC  3.44*  3.91*  3.99*   HEMOGLOBIN  8.9*  10.2*  10.5*   HEMATOCRIT  28.1*  31.7*  32.3*   MCV  81.7  81.1*  81.0*   MCH  25.9*  26.1*  26.3*   MCHC  31.7*  32.2*  32.5*   RDW  49.1  49.8  49.6   PLATELETCT  157*  191  218   MPV  10.3  9.6  10.0     Recent Labs      11/18/18   0505  11/19/18   0415  11/20/18   0523   SODIUM  145  141  133*  134*   POTASSIUM  3.7  3.3*  3.5*  3.5*   CHLORIDE  119*  110  104  103   CO2  17*  23  23  23   GLUCOSE  108*  108*  104*  105*   BUN  10  4*  12  12   CREATININE  0.41*  0.58  0.59  0.54   CALCIUM  7.7*  8.4*  8.9  9.2             Recent Labs      11/18/18   0205   TRIGLYCERIDE  47       Imaging  DX-CHEST-PORTABLE (1 VIEW)   Final Result      1.  Multiple right-sided rib fractures.   2.  There is no pneumothorax.       DX-CHEST-PORTABLE (1 VIEW)   Final Result      No acute cardiopulmonary abnormality.      OUTSIDE IMAGES-DX CHEST   Final Result           Assessment/Plan  Drug overdose, intentional self-harm, initial encounter (HCC)- (present on admission)   Assessment & Plan    Allegedly massive ingestion of Seroquel  Twelve-lead EKG showed a QTc interval of 458  Medically stable presently     Toxic encephalopathy- (present on admission)   Assessment & Plan    Secondary to seroquel overdose  Improved/resolving       Anemia   Assessment & Plan    Menstrual blood loss likely contributing  Pursue outpatient follow-up     Rib fractures   Assessment & Plan    Patient reports old traumatic injury     Hypokalemia- (present on admission)   Assessment & Plan    Replace as necessary     Fever- (present on admission)   Assessment & Plan    No overt infection by examination     Schizoaffective disorder (HCC)- (present on admission)   Assessment & Plan    Psychiatry consultation appreciated  Seroquel not to be resumed  Legal hold extended     Plan  Psychiatry consult  We will hold  Monitor closely for additional effects of overdose  A.m. Labs  Okay for transfer to telemetry  Anemia workup  See orders  Critically ill but stable for downgrade    VTE prophylaxis: Lovenox

## 2018-11-21 NOTE — DISCHARGE PLANNING
Received Transport Form at 1300  Spoke to Orestes at [facility]    Transport is scheduled for 11/21 at 1430 going to [place].

## 2018-11-21 NOTE — DISCHARGE PLANNING
Agency/Facility Name: [facility], [facility], [facility], [facility]  Plan or Request: Medical Clearance and behavioral health referral faxed to above facilities.

## 2018-11-21 NOTE — PROGRESS NOTES
Assumed care at 1845. Bedside report received from REHANA Xiao. Patient's chart and MAR reviewed. Pt denies pain at this time. Pt is A & O x4. Patient was updated on plan of care for the day. Questions answered and concerns addressed.  Pt denies any additional needs at this time. White board updated. Call light, phone and personal belongings within reach.     Safety sitter at bedside.

## 2018-11-21 NOTE — DISCHARGE PLANNING
MSW received call from [employer] at [place]. They stated pt's Medi-seymour has termed. If pt wants to reinstate it she can call [phone]. MSW updated KAREEM Narayanan.

## 2018-11-21 NOTE — PROGRESS NOTES
Pt became very agitated, emotional and aggressive.  Throwing things in room and yelling profanities at RN.  MD present, psych called to bedside, Haldol ordered and given.  Sitter still present at bedside.      1215: Received call from [facility] regarding pt being accepted.  Notified pt.

## 2018-11-21 NOTE — DISCHARGE SUMMARY
Discharge Summary    CHIEF COMPLAINT ON ADMISSION  Chief Complaint   Patient presents with   • Drug Overdose     Pt took approx 72 Seroquel in an attempt to end her life. Upon arrival to [facility] ED pt was lethargic and only arousable to painful stimuli. Pt intubated at outlying facility.    • Suicide Attempt       Reason for Admission  Transfer     Admission Date  11/18/2018    CODE STATUS  Full Code    HPI & HOSPITAL COURSE  This is a 27 y.o. female here with intentional overdose/suicide attempt; she was intubated to protect her airway.    11/19 the patient successfully extubated placed on legal hold, is somnolent but overall improved Psychiatry consulted and inpatient psychiatric hospitalization is recommended.  Patient is transferred to an inpatient facility ([facility])    Therefore, she is discharged in fair and stable condition to a psychiatric hospital.    The patient met 2-midnight criteria for an inpatient stay at the time of discharge.    Discharge Date  11/21/2018    FOLLOW UP ITEMS POST DISCHARGE  None    DISCHARGE DIAGNOSES  Principal Problem (Resolved):    Acute hypoxemic respiratory failure (HCC) POA: Yes  Active Problems:    Drug overdose, intentional self-harm, initial encounter ([facility]) POA: Yes    Schizoaffective disorder (HCC) (Chronic) POA: Yes    Rib fractures POA: Unknown    Anemia POA: Unknown    Chronic pain POA: Unknown  Resolved Problems:    Toxic encephalopathy POA: Yes    Fever POA: Yes    Hypokalemia POA: Yes      FOLLOW UP  No future appointments.  No follow-up provider specified.    MEDICATIONS ON DISCHARGE     Medication List      CONTINUE taking these medications      Instructions   buprenorphine-naloxone 8-2 MG Subl  Commonly known as:  SUBOXONE   Place 1 Tab under tongue every day.  Dose:  1 Tab        STOP taking these medications    lithium carbonate 150 MG Caps  Commonly known as:  ESKALITH     SEROQUEL 300 MG tablet  Generic drug:  quetiapine     VALIUM 10 MG  "tablet  Generic drug:  diazepam            Allergies  Allergies   Allergen Reactions   • Lorazepam [Ativan] Anxiety     Pt becomes \"violent and crazy,\" per mother.   • Sulfa Drugs Rash     unknown       DIET  Orders Placed This Encounter   Procedures   • Diet Order Regular (PATIENT IS A SUICIDE RISK NO MEDAL UTENSILS)     Standing Status:   Standing     Number of Occurrences:   1     Order Specific Question:   Diet:     Answer:   Regular [1]     Comments:   PATIENT IS A SUICIDE RISK NO MEDAL UTENSILS       ACTIVITY  As tolerated.  Weight bearing as tolerated    CONSULTATIONS  Psychiatry    PROCEDURES  None    LABORATORY  Lab Results   Component Value Date    SODIUM 138 11/21/2018    POTASSIUM 3.7 11/21/2018    CHLORIDE 104 11/21/2018    CO2 26 11/21/2018    GLUCOSE 84 11/21/2018    BUN 9 11/21/2018    CREATININE 0.55 11/21/2018        Lab Results   Component Value Date    WBC 6.5 11/21/2018    HEMOGLOBIN 11.0 (L) 11/21/2018    HEMATOCRIT 34.1 (L) 11/21/2018    PLATELETCT 245 11/21/2018        Total time of the discharge process exceeds 25 minutes.  "

## 2018-11-26 NOTE — DISCHARGE PLANNING
Notice of transfer filed with the court via [employer], scanned copy of verified notice onto .

## 2019-05-21 NOTE — RESPIRATORY CARE
Extubation    Cuff leak noted: yes  Stridor present: not at this time        Patient toleration: well    Events/Summary/Plan: pt extubated per Dr. Siegel, cuff leak present, no stridor at this time, pt placed on 2LNC and tolerating well at this time (05/25/17 1120)         PRINCIPAL DISCHARGE DIAGNOSIS  Diagnosis: Hypertension  Assessment and Plan of Treatment: Take all medication as instructed    dont skip dose  check BP regaulry  low salt diet and excercise regularly      SECONDARY DISCHARGE DIAGNOSES  Diagnosis: Chest pain  Assessment and Plan of Treatment: likley related to your high blood pressure  reduce the anxiety and recommned relaxation methods   follow up with the Primary care physician  stress test negative for ischemia    Diagnosis: H/O: hypothyroidism  Assessment and Plan of Treatment: continue home medication    Diagnosis: Cough  Assessment and Plan of Treatment: follow up with Dr Roper PRINCIPAL DISCHARGE DIAGNOSIS  Diagnosis: Hypertension  Assessment and Plan of Treatment: Take all medication as instructed    dont skip dose  check BP regaulry  low salt diet and excercise regularly      SECONDARY DISCHARGE DIAGNOSES  Diagnosis: Hypokalemia  Assessment and Plan of Treatment: continue oral potassium for 5 days  follow up with the Primary care physcian within 5 days to recheck the potassium   have food rich in potassium like banana and vegetables    Diagnosis: Chest pain  Assessment and Plan of Treatment: likley related to your high blood pressure  reduce the anxiety and recommned relaxation methods   follow up with the Primary care physician  stress test negative for ischemia    Diagnosis: H/O: hypothyroidism  Assessment and Plan of Treatment: continue home medication    Diagnosis: Cough  Assessment and Plan of Treatment: follow up with Dr Roper PRINCIPAL DISCHARGE DIAGNOSIS  Diagnosis: Hypertension  Assessment and Plan of Treatment: Take all medication as instructed    dont skip medication   check BP regaulry  low salt diet and excercise regularly      SECONDARY DISCHARGE DIAGNOSES  Diagnosis: Hypokalemia  Assessment and Plan of Treatment: continue oral potassium for 5 days  follow up with the Primary care physcian within 5 days to recheck the potassium   have food rich in potassium like banana and vegetables    Diagnosis: Chest pain  Assessment and Plan of Treatment: likley related to your high blood pressure  reduce the anxiety and recommned relaxation methods   follow up with the Primary care physician  stress test negative for ischemia    Diagnosis: H/O: hypothyroidism  Assessment and Plan of Treatment: continue home medication    Diagnosis: Cough  Assessment and Plan of Treatment: follow up with Dr Roper

## 2019-06-30 ENCOUNTER — HOSPITAL ENCOUNTER (OUTPATIENT)
Dept: HOSPITAL 8 - ED | Age: 29
Setting detail: OBSERVATION
LOS: 2 days | Discharge: PSYCHIATRIC OR SUBSTANCE USE FACILITY | End: 2019-07-02
Attending: INTERNAL MEDICINE | Admitting: INTERNAL MEDICINE
Payer: MEDICAID

## 2019-06-30 VITALS — WEIGHT: 135.58 LBS | BODY MASS INDEX: 21.79 KG/M2 | HEIGHT: 66 IN

## 2019-06-30 DIAGNOSIS — F15.10: ICD-10-CM

## 2019-06-30 DIAGNOSIS — F17.200: ICD-10-CM

## 2019-06-30 DIAGNOSIS — F14.10: ICD-10-CM

## 2019-06-30 DIAGNOSIS — Z79.899: ICD-10-CM

## 2019-06-30 DIAGNOSIS — F31.9: Primary | ICD-10-CM

## 2019-06-30 DIAGNOSIS — F11.20: ICD-10-CM

## 2019-06-30 LAB
ALBUMIN SERPL-MCNC: 3.9 G/DL (ref 3.4–5)
ALP SERPL-CCNC: 94 U/L (ref 45–117)
ALT SERPL-CCNC: 38 U/L (ref 12–78)
ANION GAP SERPL CALC-SCNC: 8 MMOL/L (ref 5–15)
BASOPHILS # BLD AUTO: 0.03 X10^3/UL (ref 0–0.1)
BASOPHILS NFR BLD AUTO: 1 % (ref 0–1)
BILIRUB SERPL-MCNC: 1.2 MG/DL (ref 0.2–1)
CALCIUM SERPL-MCNC: 9.1 MG/DL (ref 8.5–10.1)
CHLORIDE SERPL-SCNC: 108 MMOL/L (ref 98–107)
CREAT SERPL-MCNC: 0.88 MG/DL (ref 0.55–1.02)
EOSINOPHIL # BLD AUTO: 0.06 X10^3/UL (ref 0–0.4)
EOSINOPHIL NFR BLD AUTO: 1 % (ref 1–7)
ERYTHROCYTE [DISTWIDTH] IN BLOOD BY AUTOMATED COUNT: 19.1 % (ref 9.6–15.2)
HCG UR SG: 1.02 (ref 1–1.03)
LYMPHOCYTES # BLD AUTO: 1.56 X10^3/UL (ref 1–3.4)
LYMPHOCYTES NFR BLD AUTO: 30 % (ref 22–44)
MCH RBC QN AUTO: 25.6 PG (ref 27–34.8)
MCHC RBC AUTO-ENTMCNC: 32 G/DL (ref 32.4–35.8)
MCV RBC AUTO: 80 FL (ref 80–100)
MD: NO
MONOCYTES # BLD AUTO: 0.8 X10^3/UL (ref 0.2–0.8)
MONOCYTES NFR BLD AUTO: 15 % (ref 2–9)
NEUTROPHILS # BLD AUTO: 2.77 X10^3/UL (ref 1.8–6.8)
NEUTROPHILS NFR BLD AUTO: 53 % (ref 42–75)
PLATELET # BLD AUTO: 288 X10^3/UL (ref 130–400)
PMV BLD AUTO: 7.2 FL (ref 7.4–10.4)
PROT SERPL-MCNC: 8.1 G/DL (ref 6.4–8.2)
RBC # BLD AUTO: 4.85 X10^6/UL (ref 3.82–5.3)
VANCOMYCIN TROUGH SERPL-MCNC: < 1.7 MG/DL (ref 2.8–20)

## 2019-06-30 PROCEDURE — G0378 HOSPITAL OBSERVATION PER HR: HCPCS

## 2019-06-30 PROCEDURE — 84703 CHORIONIC GONADOTROPIN ASSAY: CPT

## 2019-06-30 PROCEDURE — 80053 COMPREHEN METABOLIC PANEL: CPT

## 2019-06-30 PROCEDURE — 36415 COLL VENOUS BLD VENIPUNCTURE: CPT

## 2019-06-30 PROCEDURE — 80048 BASIC METABOLIC PNL TOTAL CA: CPT

## 2019-06-30 PROCEDURE — 93005 ELECTROCARDIOGRAM TRACING: CPT

## 2019-06-30 PROCEDURE — 80307 DRUG TEST PRSMV CHEM ANLYZR: CPT

## 2019-06-30 PROCEDURE — 81025 URINE PREGNANCY TEST: CPT

## 2019-06-30 PROCEDURE — 85025 COMPLETE CBC W/AUTO DIFF WBC: CPT

## 2019-06-30 NOTE — NUR
PT RESTING ON RENZO LAGOS, MONITORS IN PLACE. ROOM REMAINS SECURED, SITTER AT 
DOORWAY FOR CONTINOUS MONITORING

## 2019-06-30 NOTE — NUR
SITTER AT BEDSIDE AND OT IN [facility] ROOM CLOSELY MONITORED.  VERY CONCERNED 
ABOUT GOING INTO WITHDRAWL.

## 2019-06-30 NOTE — NUR
Patient is resting on the gurney; not in distress; unlabored respirations; room 
is secure; sitter by the hallway.

## 2019-06-30 NOTE — NUR
Packet faxed to [facility] and [facility]

## 2019-06-30 NOTE — NUR
Patient is sleeping on a gurney; not in distress; unlabored respirations; room 
secure; sitter by the hallway for continuous monitoring.

## 2019-06-30 NOTE — NUR
Patient is sleeping on a gurney; unlabored respirations; room secure; sitter by 
the hallway for continuous monitoring.

## 2019-06-30 NOTE — NUR
PT RESTING COMFORTABLY. UP TO RESTROOM AND USE OF PHONE. TV TURNED ON AT PT 
REQUEST. NO OTHER WANTS OR NEEDS EXPRESSED AT THIS TIME.

## 2019-06-30 NOTE — NUR
pt medicated per order. pt at this time crying and upset about the situation. 
pt educated on poc. pt agrees with poc at this time. pt is pleasent and 
respectful towards staff. food tray provided and is at bedside.

## 2019-06-30 NOTE — NUR
Report received from [facility] RN; assumed care of the patient; patient is on 
gurney; asleep; room is secure; sitter by the hallway for conitnuous 
monitoring.

## 2019-06-30 NOTE — NUR
PT HERE WITH [facility], PRESENTED TO [facility] WITH COMLPLAINTS OF SI, PT 
STATES THAT SHE HAS HAD MULTIPLE ATTEMPTS IN THE PAST OF OVERDOSING, PT HAS 
BEEN ON SEROQUEL AND LITHIUM IN THE PAST, STOPPED TAKING THESE 5 MONTHS AGO.  
PT IS VERY CONCERNED ABOUT GOING TO HER[facility]IN WITHDRAWL.  LAST USE FEW HOURS AGO.  
CURRENTLY CALM AND COOPERATIVE, URINE SPECIMEN COLLECTED AND SENT TO LAB, 
BELONGING SECURED AND PLACED IN LOCK UP, ROOM SECURED AS WELL.  PT AWARE OF 
PLAN OF CARE.  LAB TO BEDSIDE.

## 2019-06-30 NOTE — NUR
PT ASLEEP IN [facility] AT THIS TIME; MAUREEN. SITTER OUTSIDE OF PT ROOM FOR DIRECT 
OBSERVATION.

## 2019-06-30 NOTE — NUR
Patient is awake; provided crackers and water to drink; room is secure; sitter 
by the hallway for continuous monitoring.

## 2019-06-30 NOTE — NUR
Patient is awake at this time, eating her lunch tray on her bedside; this nurse 
came in to check on her and she refused assessment, she said, "Stop asking me 
the same questions. It's the same from before. I don't want people asking me 
everytime." patient also requested for ativan for her anxiety. room is secure; 
sitter by the hallway for continuous monitoring.

## 2019-07-01 VITALS — DIASTOLIC BLOOD PRESSURE: 67 MMHG | SYSTOLIC BLOOD PRESSURE: 91 MMHG

## 2019-07-01 VITALS — DIASTOLIC BLOOD PRESSURE: 62 MMHG | SYSTOLIC BLOOD PRESSURE: 97 MMHG

## 2019-07-01 LAB
<PLATELET ESTIMATE>: ADEQUATE
<PLT MORPHOLOGY>: (no result)
ANION GAP SERPL CALC-SCNC: 6 MMOL/L (ref 5–15)
ANISOCYTOSIS BLD QL SMEAR: (no result)
CALCIUM SERPL-MCNC: 9.1 MG/DL (ref 8.5–10.1)
CHLORIDE SERPL-SCNC: 112 MMOL/L (ref 98–107)
CREAT SERPL-MCNC: 0.73 MG/DL (ref 0.55–1.02)
EOS#(MANUAL): 0.1 X10^3/UL (ref 0–0.4)
EOS% (MANUAL): 3 % (ref 1–7)
ERYTHROCYTE [DISTWIDTH] IN BLOOD BY AUTOMATED COUNT: 19 % (ref 9.6–15.2)
LYMPH#(MANUAL): 1.87 X10^3/UL (ref 1–3.4)
LYMPHS% (MANUAL): 55 % (ref 22–44)
MCH RBC QN AUTO: 25.6 PG (ref 27–34.8)
MCHC RBC AUTO-ENTMCNC: 32 G/DL (ref 32.4–35.8)
MCV RBC AUTO: 79.9 FL (ref 80–100)
MD: YES
MICROCYTES BLD QL SMEAR: (no result)
MONOS#(MANUAL): 0.14 X10^3/UL (ref 0.3–2.7)
MONOS% (MANUAL): 4 % (ref 2–9)
PLATELET # BLD AUTO: 271 X10^3/UL (ref 130–400)
PMV BLD AUTO: 7.8 FL (ref 7.4–10.4)
RBC # BLD AUTO: 4.75 X10^6/UL (ref 3.82–5.3)
SEG#(MANUAL): 1.29 X10^3/UL (ref 1.8–6.8)
SEGS% (MANUAL): 38 % (ref 42–75)

## 2019-07-01 RX ADMIN — METHOCARBAMOL PRN MG: 500 TABLET ORAL at 22:36

## 2019-07-01 NOTE — NUR
pt sleeping in [facility] at this time; karen. pt has sitter outside of room for 
direct observation of pt.

## 2019-07-01 NOTE — NUR
Patient in bed, eyes closed, respirations are even and unlabored. Sitter is 
monitoring patient from room doorway on constant watch. Security/suicide watch 
meal tray has been ordered for patient. Will continue to monitor.

## 2019-07-01 NOTE — NUR
Client is resting, eyes closed, respirations are even and unlabored. Patient is 
monitored constantly for safety by staff.

## 2019-07-01 NOTE — NUR
pt asleep in [facility] at this time; karen. sitter outside of pt room for direct 
observation of pt at this time.

## 2019-07-01 NOTE — NUR
pt sleeping in [facility] at this time; karen. sitter outside of room for direct 
observation of pt.

## 2019-07-01 NOTE — NUR
PT ASLEEP IN [facility] AT THIS TIME; MAUREEN. SITTER OUTSIDE OF ROOM FOR DIRECT 
OBSERVATION OF PT.

## 2019-07-01 NOTE — NUR
pt awake and requesting medications. pt medicated per mar. pt cooperative and 
ambulates hallway to use telephone. pt provided juice at this time. vss and 
updated. sitter outside of room for direct observation of pt.

## 2019-07-02 VITALS — SYSTOLIC BLOOD PRESSURE: 95 MMHG | DIASTOLIC BLOOD PRESSURE: 59 MMHG

## 2019-07-02 VITALS — SYSTOLIC BLOOD PRESSURE: 95 MMHG | DIASTOLIC BLOOD PRESSURE: 61 MMHG

## 2019-07-02 RX ADMIN — METHOCARBAMOL PRN MG: 500 TABLET ORAL at 09:34

## 2019-07-02 RX ADMIN — METHOCARBAMOL PRN MG: 500 TABLET ORAL at 15:23

## 2019-09-07 ENCOUNTER — HOSPITAL ENCOUNTER (INPATIENT)
Dept: HOSPITAL 8 - ED | Age: 29
LOS: 3 days | Discharge: LEFT BEFORE DISCHARGE | DRG: 720 | End: 2019-09-10
Attending: INTERNAL MEDICINE | Admitting: INTERNAL MEDICINE
Payer: MEDICAID

## 2019-09-07 VITALS — HEIGHT: 66 IN | WEIGHT: 136.91 LBS | BODY MASS INDEX: 22 KG/M2

## 2019-09-07 DIAGNOSIS — F17.210: ICD-10-CM

## 2019-09-07 DIAGNOSIS — E83.42: ICD-10-CM

## 2019-09-07 DIAGNOSIS — E83.39: ICD-10-CM

## 2019-09-07 DIAGNOSIS — F11.20: ICD-10-CM

## 2019-09-07 DIAGNOSIS — N17.9: ICD-10-CM

## 2019-09-07 DIAGNOSIS — R65.21: ICD-10-CM

## 2019-09-07 DIAGNOSIS — E87.6: ICD-10-CM

## 2019-09-07 DIAGNOSIS — E87.0: ICD-10-CM

## 2019-09-07 DIAGNOSIS — Z53.21: ICD-10-CM

## 2019-09-07 DIAGNOSIS — A41.9: Primary | ICD-10-CM

## 2019-09-07 DIAGNOSIS — R32: ICD-10-CM

## 2019-09-07 DIAGNOSIS — F31.9: ICD-10-CM

## 2019-09-07 LAB
ALBUMIN SERPL-MCNC: 3.2 G/DL (ref 3.4–5)
ANION GAP SERPL CALC-SCNC: 12 MMOL/L (ref 5–15)
BASOPHILS # BLD AUTO: 0 X10^3/UL (ref 0–0.1)
BASOPHILS NFR BLD AUTO: 0 % (ref 0–1)
CALCIUM SERPL-MCNC: 8.3 MG/DL (ref 8.5–10.1)
CHLORIDE SERPL-SCNC: 111 MMOL/L (ref 98–107)
CREAT SERPL-MCNC: 1.3 MG/DL (ref 0.55–1.02)
EOSINOPHIL # BLD AUTO: 0 X10^3/UL (ref 0–0.4)
EOSINOPHIL NFR BLD AUTO: 0 % (ref 1–7)
ERYTHROCYTE [DISTWIDTH] IN BLOOD BY AUTOMATED COUNT: 18.7 % (ref 9.6–15.2)
LYMPHOCYTES # BLD AUTO: 0.22 X10^3/UL (ref 1–3.4)
LYMPHOCYTES NFR BLD AUTO: 13 % (ref 22–44)
MCH RBC QN AUTO: 26.5 PG (ref 27–34.8)
MCHC RBC AUTO-ENTMCNC: 32.4 G/DL (ref 32.4–35.8)
MCV RBC AUTO: 81.6 FL (ref 80–100)
MD: NO
MICROSCOPIC: (no result)
MONOCYTES # BLD AUTO: 0 X10^3/UL (ref 0.2–0.8)
MONOCYTES NFR BLD AUTO: 0 % (ref 2–9)
NEUTROPHILS # BLD AUTO: 1.39 X10^3/UL (ref 1.8–6.8)
NEUTROPHILS NFR BLD AUTO: 86 % (ref 42–75)
PLATELET # BLD AUTO: 200 X10^3/UL (ref 130–400)
PMV BLD AUTO: 7 FL (ref 7.4–10.4)
RBC # BLD AUTO: 4.67 X10^6/UL (ref 3.82–5.3)
TROPONIN I SERPL-MCNC: < 0.015 NG/ML (ref 0–0.04)

## 2019-09-07 PROCEDURE — 96365 THER/PROPH/DIAG IV INF INIT: CPT

## 2019-09-07 PROCEDURE — 81001 URINALYSIS AUTO W/SCOPE: CPT

## 2019-09-07 PROCEDURE — 87040 BLOOD CULTURE FOR BACTERIA: CPT

## 2019-09-07 PROCEDURE — 93005 ELECTROCARDIOGRAM TRACING: CPT

## 2019-09-07 PROCEDURE — 82040 ASSAY OF SERUM ALBUMIN: CPT

## 2019-09-07 PROCEDURE — 87389 HIV-1 AG W/HIV-1&-2 AB AG IA: CPT

## 2019-09-07 PROCEDURE — 71045 X-RAY EXAM CHEST 1 VIEW: CPT

## 2019-09-07 PROCEDURE — 84145 PROCALCITONIN (PCT): CPT

## 2019-09-07 PROCEDURE — 84484 ASSAY OF TROPONIN QUANT: CPT

## 2019-09-07 PROCEDURE — 83735 ASSAY OF MAGNESIUM: CPT

## 2019-09-07 PROCEDURE — 84703 CHORIONIC GONADOTROPIN ASSAY: CPT

## 2019-09-07 PROCEDURE — 80202 ASSAY OF VANCOMYCIN: CPT

## 2019-09-07 PROCEDURE — 93325 DOPPLER ECHO COLOR FLOW MAPG: CPT

## 2019-09-07 PROCEDURE — 80307 DRUG TEST PRSMV CHEM ANLYZR: CPT

## 2019-09-07 PROCEDURE — 93321 DOPPLER ECHO F-UP/LMTD STD: CPT

## 2019-09-07 PROCEDURE — 85025 COMPLETE CBC W/AUTO DIFF WBC: CPT

## 2019-09-07 PROCEDURE — 93308 TTE F-UP OR LMTD: CPT

## 2019-09-07 PROCEDURE — 83605 ASSAY OF LACTIC ACID: CPT

## 2019-09-07 PROCEDURE — 87324 CLOSTRIDIUM AG IA: CPT

## 2019-09-07 PROCEDURE — 36415 COLL VENOUS BLD VENIPUNCTURE: CPT

## 2019-09-07 PROCEDURE — 80053 COMPREHEN METABOLIC PANEL: CPT

## 2019-09-07 PROCEDURE — 84100 ASSAY OF PHOSPHORUS: CPT

## 2019-09-07 PROCEDURE — 80048 BASIC METABOLIC PNL TOTAL CA: CPT

## 2019-09-07 NOTE — NUR
PT ARRIVED INCONTINENT OF STOOL. WHILE ATTEMPTING TO CHANGE PATIENT SHE BEGAN 
TO VOMIT AS WELL AS DEFICATE. PT THEN REQUESTED A BED PAN TO CONTINUE BM. PT 
WAS CLEANED, CHANGED, LINENS CHANGED. PT PLACED ON MONITOR. EDMD AT BEDSIDE.

## 2019-09-08 VITALS — SYSTOLIC BLOOD PRESSURE: 108 MMHG | DIASTOLIC BLOOD PRESSURE: 70 MMHG

## 2019-09-08 VITALS — SYSTOLIC BLOOD PRESSURE: 90 MMHG | DIASTOLIC BLOOD PRESSURE: 64 MMHG

## 2019-09-08 VITALS — SYSTOLIC BLOOD PRESSURE: 100 MMHG | DIASTOLIC BLOOD PRESSURE: 66 MMHG

## 2019-09-08 VITALS — DIASTOLIC BLOOD PRESSURE: 78 MMHG | SYSTOLIC BLOOD PRESSURE: 109 MMHG

## 2019-09-08 LAB
<PLATELET ESTIMATE>: ADEQUATE
<PLT MORPHOLOGY>: (no result)
ANISOCYTOSIS BLD QL SMEAR: (no result)
BAND#(MANUAL): 0.16 X10^3/UL
CLOSTRIDIUM DIFFICILE ANTIGEN: NEGATIVE
CLOSTRIDIUM DIFFICILE TOXIN: NEGATIVE
EOS#(MANUAL): 0.03 X10^3/UL (ref 0–0.4)
EOS% (MANUAL): 2 % (ref 1–7)
HYPOCHROMIA BLD QL SMEAR: (no result)
LYMPH#(MANUAL): 0.38 X10^3/UL (ref 1–3.4)
LYMPHS% (MANUAL): 24 % (ref 22–44)
MICROCYTES BLD QL SMEAR: (no result)
MONOS#(MANUAL): 0.03 X10^3/UL (ref 0.3–2.7)
MONOS% (MANUAL): 2 % (ref 2–9)
NEUTS BAND NFR BLD: 10 % (ref 0–7)
NRBC % (MANUAL): 2 % (ref 0–1)
SEG#(MANUAL): 0.99 X10^3/UL (ref 1.8–6.8)
SEGS% (MANUAL): 62 % (ref 42–75)

## 2019-09-08 RX ADMIN — DIPHENOXYLATE HYDROCHLORIDE AND ATROPINE SULFATE PRN TAB: 2.5; .025 TABLET ORAL at 18:31

## 2019-09-08 RX ADMIN — DIPHENOXYLATE HYDROCHLORIDE AND ATROPINE SULFATE PRN TAB: 2.5; .025 TABLET ORAL at 12:12

## 2019-09-08 RX ADMIN — SODIUM CHLORIDE SCH MLS/HR: 0.9 INJECTION, SOLUTION INTRAVENOUS at 15:16

## 2019-09-08 RX ADMIN — TAZOBACTAM SODIUM AND PIPERACILLIN SODIUM SCH MLS/HR: 375; 3 INJECTION, SOLUTION INTRAVENOUS at 06:00

## 2019-09-08 RX ADMIN — TAZOBACTAM SODIUM AND PIPERACILLIN SODIUM SCH MLS/HR: 375; 3 INJECTION, SOLUTION INTRAVENOUS at 17:42

## 2019-09-08 RX ADMIN — SODIUM CHLORIDE SCH MLS/HR: 0.9 INJECTION, SOLUTION INTRAVENOUS at 22:40

## 2019-09-08 RX ADMIN — VANCOMYCIN HYDROCHLORIDE SCH MLS/HR: 1 INJECTION, SOLUTION INTRAVENOUS at 10:41

## 2019-09-08 RX ADMIN — ONDANSETRON PRN MG: 2 INJECTION, SOLUTION INTRAMUSCULAR; INTRAVENOUS at 18:31

## 2019-09-08 RX ADMIN — ACETAMINOPHEN PRN MG: 325 TABLET, FILM COATED ORAL at 20:14

## 2019-09-08 RX ADMIN — ONDANSETRON PRN MG: 2 INJECTION, SOLUTION INTRAMUSCULAR; INTRAVENOUS at 03:05

## 2019-09-08 RX ADMIN — LIDOCAINE SCH PATCH: 50 PATCH CUTANEOUS at 13:30

## 2019-09-08 RX ADMIN — SODIUM CHLORIDE SCH MLS/HR: 0.9 INJECTION, SOLUTION INTRAVENOUS at 00:57

## 2019-09-08 RX ADMIN — SODIUM CHLORIDE SCH MLS/HR: 0.9 INJECTION, SOLUTION INTRAVENOUS at 07:17

## 2019-09-08 RX ADMIN — METHOCARBAMOL PRN MG: 750 TABLET ORAL at 16:16

## 2019-09-08 RX ADMIN — VANCOMYCIN HYDROCHLORIDE SCH MLS/HR: 1 INJECTION, SOLUTION INTRAVENOUS at 22:40

## 2019-09-08 RX ADMIN — NICOTINE SCH PATCH: 14 PATCH, EXTENDED RELEASE TRANSDERMAL at 00:57

## 2019-09-08 RX ADMIN — ACETAMINOPHEN PRN MG: 325 TABLET, FILM COATED ORAL at 02:59

## 2019-09-08 RX ADMIN — QUETIAPINE FUMARATE PRN MG: 100 TABLET ORAL at 20:15

## 2019-09-08 RX ADMIN — TAZOBACTAM SODIUM AND PIPERACILLIN SODIUM SCH MLS/HR: 375; 3 INJECTION, SOLUTION INTRAVENOUS at 12:19

## 2019-09-09 VITALS — SYSTOLIC BLOOD PRESSURE: 112 MMHG | DIASTOLIC BLOOD PRESSURE: 77 MMHG

## 2019-09-09 VITALS — DIASTOLIC BLOOD PRESSURE: 46 MMHG | SYSTOLIC BLOOD PRESSURE: 91 MMHG

## 2019-09-09 VITALS — SYSTOLIC BLOOD PRESSURE: 92 MMHG | DIASTOLIC BLOOD PRESSURE: 41 MMHG

## 2019-09-09 VITALS — DIASTOLIC BLOOD PRESSURE: 72 MMHG | SYSTOLIC BLOOD PRESSURE: 109 MMHG

## 2019-09-09 VITALS — SYSTOLIC BLOOD PRESSURE: 90 MMHG | DIASTOLIC BLOOD PRESSURE: 41 MMHG

## 2019-09-09 VITALS — SYSTOLIC BLOOD PRESSURE: 112 MMHG | DIASTOLIC BLOOD PRESSURE: 73 MMHG

## 2019-09-09 LAB
<PLATELET ESTIMATE>: (no result)
<PLT MORPHOLOGY>: (no result)
ALBUMIN SERPL-MCNC: 2.6 G/DL (ref 3.4–5)
ALP SERPL-CCNC: 107 U/L (ref 45–117)
ALT SERPL-CCNC: 83 U/L (ref 12–78)
ANION GAP SERPL CALC-SCNC: 8 MMOL/L (ref 5–15)
ANISOCYTOSIS BLD QL SMEAR: (no result)
BAND#(MANUAL): 6.12 X10^3/UL
BILIRUB SERPL-MCNC: 1.4 MG/DL (ref 0.2–1)
CALCIUM SERPL-MCNC: 7.9 MG/DL (ref 8.5–10.1)
CHLORIDE SERPL-SCNC: 124 MMOL/L (ref 98–107)
CREAT SERPL-MCNC: 0.93 MG/DL (ref 0.55–1.02)
ERYTHROCYTE [DISTWIDTH] IN BLOOD BY AUTOMATED COUNT: 19 % (ref 9.6–15.2)
LYMPH#(MANUAL): 1.69 X10^3/UL (ref 1–3.4)
LYMPHS% (MANUAL): 8 % (ref 22–44)
MCH RBC QN AUTO: 27.4 PG (ref 27–34.8)
MCHC RBC AUTO-ENTMCNC: 33.3 G/DL (ref 32.4–35.8)
MCV RBC AUTO: 82.3 FL (ref 80–100)
MD: YES
MONOS#(MANUAL): 0.84 X10^3/UL (ref 0.3–2.7)
MONOS% (MANUAL): 4 % (ref 2–9)
NEUTS BAND NFR BLD: 29 % (ref 0–7)
OVALOCYTES BLD QL SMEAR: (no result)
PLATELET # BLD AUTO: 121 X10^3/UL (ref 130–400)
PMNS WITH VACUOLES: (no result)
PMV BLD AUTO: 9.2 FL (ref 7.4–10.4)
PROT SERPL-MCNC: 5.5 G/DL (ref 6.4–8.2)
RBC # BLD AUTO: 4.25 X10^6/UL (ref 3.82–5.3)
SEG#(MANUAL): 12.45 X10^3/UL (ref 1.8–6.8)
SEGS% (MANUAL): 59 % (ref 42–75)

## 2019-09-09 RX ADMIN — METHOCARBAMOL PRN MG: 750 TABLET ORAL at 00:25

## 2019-09-09 RX ADMIN — POTASSIUM CHLORIDE SCH MEQ: 20 TABLET, EXTENDED RELEASE ORAL at 20:57

## 2019-09-09 RX ADMIN — VANCOMYCIN HYDROCHLORIDE SCH MLS/HR: 1 INJECTION, SOLUTION INTRAVENOUS at 22:23

## 2019-09-09 RX ADMIN — METHOCARBAMOL PRN MG: 750 TABLET ORAL at 09:52

## 2019-09-09 RX ADMIN — TAZOBACTAM SODIUM AND PIPERACILLIN SODIUM SCH MLS/HR: 375; 3 INJECTION, SOLUTION INTRAVENOUS at 12:00

## 2019-09-09 RX ADMIN — TAZOBACTAM SODIUM AND PIPERACILLIN SODIUM SCH MLS/HR: 375; 3 INJECTION, SOLUTION INTRAVENOUS at 05:52

## 2019-09-09 RX ADMIN — LIDOCAINE SCH PATCH: 50 PATCH CUTANEOUS at 13:37

## 2019-09-09 RX ADMIN — METHOCARBAMOL PRN MG: 750 TABLET ORAL at 20:47

## 2019-09-09 RX ADMIN — ONDANSETRON PRN MG: 2 INJECTION, SOLUTION INTRAMUSCULAR; INTRAVENOUS at 00:29

## 2019-09-09 RX ADMIN — Medication SCH NOTE: at 00:25

## 2019-09-09 RX ADMIN — BUPRENORPHINE AND NALOXONE SCH TAB: 8; 2 TABLET SUBLINGUAL at 09:15

## 2019-09-09 RX ADMIN — TAZOBACTAM SODIUM AND PIPERACILLIN SODIUM SCH MLS/HR: 375; 3 INJECTION, SOLUTION INTRAVENOUS at 00:13

## 2019-09-09 RX ADMIN — POTASSIUM PHOSPHATE, MONOBASIC AND POTASSIUM PHOSPHATE, DIBASIC ONE MLS/HR: 224; 236 INJECTION, SOLUTION, CONCENTRATE INTRAVENOUS at 20:12

## 2019-09-09 RX ADMIN — NICOTINE SCH PATCH: 14 PATCH, EXTENDED RELEASE TRANSDERMAL at 00:13

## 2019-09-09 RX ADMIN — POTASSIUM PHOSPHATE, MONOBASIC AND POTASSIUM PHOSPHATE, DIBASIC ONE MLS/HR: 224; 236 INJECTION, SOLUTION, CONCENTRATE INTRAVENOUS at 20:43

## 2019-09-09 RX ADMIN — TAZOBACTAM SODIUM AND PIPERACILLIN SODIUM SCH MLS/HR: 375; 3 INJECTION, SOLUTION INTRAVENOUS at 18:14

## 2019-09-09 RX ADMIN — VANCOMYCIN HYDROCHLORIDE SCH MLS/HR: 1 INJECTION, SOLUTION INTRAVENOUS at 10:33

## 2019-09-09 RX ADMIN — QUETIAPINE FUMARATE PRN MG: 100 TABLET ORAL at 20:48

## 2019-09-09 RX ADMIN — BUPRENORPHINE AND NALOXONE SCH TAB: 8; 2 TABLET SUBLINGUAL at 20:47

## 2019-09-09 RX ADMIN — POTASSIUM CHLORIDE SCH MEQ: 20 TABLET, EXTENDED RELEASE ORAL at 20:47

## 2019-09-10 VITALS — DIASTOLIC BLOOD PRESSURE: 69 MMHG | SYSTOLIC BLOOD PRESSURE: 112 MMHG

## 2019-09-10 VITALS — DIASTOLIC BLOOD PRESSURE: 76 MMHG | SYSTOLIC BLOOD PRESSURE: 115 MMHG

## 2019-09-10 LAB
<PLATELET ESTIMATE>: (no result)
<PLT MORPHOLOGY>: (no result)
ALBUMIN SERPL-MCNC: 2.4 G/DL (ref 3.4–5)
ALP SERPL-CCNC: 119 U/L (ref 45–117)
ALT SERPL-CCNC: 93 U/L (ref 12–78)
ANION GAP SERPL CALC-SCNC: 6 MMOL/L (ref 5–15)
ANISOCYTOSIS BLD QL SMEAR: (no result)
BAND#(MANUAL): 1.55 X10^3/UL
BILIRUB SERPL-MCNC: 0.7 MG/DL (ref 0.2–1)
BURR CELLS BLD QL SMEAR: (no result)
CALCIUM SERPL-MCNC: 7.9 MG/DL (ref 8.5–10.1)
CHLORIDE SERPL-SCNC: 117 MMOL/L (ref 98–107)
CREAT SERPL-MCNC: 0.79 MG/DL (ref 0.55–1.02)
EOS#(MANUAL): 0.42 X10^3/UL (ref 0–0.4)
EOS% (MANUAL): 3 % (ref 1–7)
ERYTHROCYTE [DISTWIDTH] IN BLOOD BY AUTOMATED COUNT: 19.8 % (ref 9.6–15.2)
LYMPH#(MANUAL): 2.12 X10^3/UL (ref 1–3.4)
LYMPHS% (MANUAL): 15 % (ref 22–44)
MCH RBC QN AUTO: 27.8 PG (ref 27–34.8)
MCHC RBC AUTO-ENTMCNC: 33.5 G/DL (ref 32.4–35.8)
MCV RBC AUTO: 83 FL (ref 80–100)
MD: YES
MONOS#(MANUAL): 0.42 X10^3/UL (ref 0.3–2.7)
MONOS% (MANUAL): 3 % (ref 2–9)
NEUTS BAND NFR BLD: 11 % (ref 0–7)
OVALOCYTES BLD QL SMEAR: (no result)
PLATELET # BLD AUTO: 117 X10^3/UL (ref 130–400)
PMV BLD AUTO: 9.4 FL (ref 7.4–10.4)
PROT SERPL-MCNC: 5.4 G/DL (ref 6.4–8.2)
RBC # BLD AUTO: 3.92 X10^6/UL (ref 3.82–5.3)
SEG#(MANUAL): 9.59 X10^3/UL (ref 1.8–6.8)
SEGS% (MANUAL): 68 % (ref 42–75)

## 2019-09-10 RX ADMIN — NICOTINE SCH PATCH: 14 PATCH, EXTENDED RELEASE TRANSDERMAL at 00:13

## 2019-09-10 RX ADMIN — Medication SCH NOTE: at 00:58

## 2019-09-10 RX ADMIN — TAZOBACTAM SODIUM AND PIPERACILLIN SODIUM SCH MLS/HR: 375; 3 INJECTION, SOLUTION INTRAVENOUS at 06:07

## 2019-09-10 RX ADMIN — POTASSIUM CHLORIDE SCH MEQ: 20 TABLET, EXTENDED RELEASE ORAL at 08:00

## 2019-09-10 RX ADMIN — TAZOBACTAM SODIUM AND PIPERACILLIN SODIUM SCH MLS/HR: 375; 3 INJECTION, SOLUTION INTRAVENOUS at 00:13

## 2019-09-12 ENCOUNTER — TELEPHONE (OUTPATIENT)
Dept: SCHEDULING | Facility: IMAGING CENTER | Age: 29
End: 2019-09-12

## 2019-11-19 ENCOUNTER — TELEPHONE (OUTPATIENT)
Dept: MEDICAL GROUP | Facility: MEDICAL CENTER | Age: 29
End: 2019-11-19

## 2019-11-19 NOTE — TELEPHONE ENCOUNTER
Left message with patient about no show to appointment today 11/19/19.  Explained this was her first no show and the no show policy.

## 2022-08-16 NOTE — DISCHARGE PLANNING
Anticipated Discharge Disposition: D/C to Psych Facility    Action: LSW completed transfer packet with original legal hold paperwork inside. RN notified. Pt will be going to [place] @ 1430 via [facility].    Barriers to Discharge: None.    Plan: No further d/c planning needs at this time.      Female

## 2024-05-25 NOTE — CARE PLAN
Problem: Safety  Goal: Will remain free from injury  No sharp objects in room, on legal hold. See close Obs. Calls appropriately.    Problem: Mobility  Goal: Risk for activity intolerance will decrease  Independently ambulatory, up self.         “Patient's name, , procedure and correct site were confirmed during the [facility] Timeout.”

## 2025-02-16 NOTE — ASSESSMENT & PLAN NOTE
-Psych following  -Patient on legal hold.  -Seroquel 200 twice a day.  - psych facility placement.    The patient is a 58y Female complaining of vomiting.